# Patient Record
Sex: MALE | Race: WHITE | Employment: OTHER | URBAN - METROPOLITAN AREA
[De-identification: names, ages, dates, MRNs, and addresses within clinical notes are randomized per-mention and may not be internally consistent; named-entity substitution may affect disease eponyms.]

---

## 2017-01-04 RX ORDER — IRBESARTAN 300 MG/1
300 TABLET ORAL
Qty: 30 TAB | Refills: 0 | Status: SHIPPED | OUTPATIENT
Start: 2017-01-04 | End: 2017-02-03 | Stop reason: SDUPTHER

## 2017-01-04 NOTE — TELEPHONE ENCOUNTER
Patient would like a 30 day supply. He doesn't feel this medication is working as good.   He has made an appt to come back in

## 2017-01-11 ENCOUNTER — OFFICE VISIT (OUTPATIENT)
Dept: INTERNAL MEDICINE CLINIC | Age: 72
End: 2017-01-11

## 2017-01-11 ENCOUNTER — HOSPITAL ENCOUNTER (OUTPATIENT)
Dept: LAB | Age: 72
Discharge: HOME | End: 2017-01-11
Payer: MEDICARE

## 2017-01-11 VITALS
RESPIRATION RATE: 12 BRPM | BODY MASS INDEX: 32.4 KG/M2 | HEIGHT: 68 IN | TEMPERATURE: 97.9 F | OXYGEN SATURATION: 98 % | DIASTOLIC BLOOD PRESSURE: 87 MMHG | WEIGHT: 213.8 LBS | HEART RATE: 81 BPM | SYSTOLIC BLOOD PRESSURE: 133 MMHG

## 2017-01-11 DIAGNOSIS — I10 ESSENTIAL HYPERTENSION: Primary | ICD-10-CM

## 2017-01-11 DIAGNOSIS — Z83.79 FH: CELIAC DISEASE: ICD-10-CM

## 2017-01-11 DIAGNOSIS — G47.33 OSA (OBSTRUCTIVE SLEEP APNEA): ICD-10-CM

## 2017-01-11 DIAGNOSIS — M79.18 LEFT BUTTOCK PAIN: ICD-10-CM

## 2017-01-11 DIAGNOSIS — Z13.0 SCREENING FOR IRON DEFICIENCY ANEMIA: ICD-10-CM

## 2017-01-11 DIAGNOSIS — Z13.9 SCREENING: ICD-10-CM

## 2017-01-11 PROCEDURE — 82306 VITAMIN D 25 HYDROXY: CPT

## 2017-01-11 PROCEDURE — 83550 IRON BINDING TEST: CPT

## 2017-01-11 NOTE — PROGRESS NOTES
HISTORY OF PRESENT ILLNESS    Presents with left buttock pain for 1 month(s). No clear injury. Saw his wife's chiropractor and feels it helped some. It is currently asymptomatic. He moved his wallet to another location. Worse with a lot of movement. Associated symptoms include: no weakness. Treatments tried include: medication not used. Daughter and mother have celiac dz. He reports issues of acid reflux and senses that he feels his throat closes when he does not chew his food. Has to eat early dinner or his stomach will churn. Unclear if types of food make it worse. If he eats bread at night, it makes him very uncomfortable. Drank a beer without much issues. Started CPAP 10 days ago, nasal, tolerating it well    Hypertension-- home -165s/90-100s. Rarely below 150 at home, but was 117/73 today. Hypertension ROS: taking medications as instructed, no medication side effects noted, no TIA's, no chest pain on exertion, no dyspnea on exertion, no swelling of ankles     reports that he quit smoking about 39 years ago. His smoking use included Cigarettes. He has a 10.00 pack-year smoking history. He has never used smokeless tobacco.    reports that he drinks about 2.5 oz of alcohol per week    BP Readings from Last 2 Encounters:   01/11/17 133/87   12/01/16 153/80       Review of Systems   All other systems reviewed and are negative, except as noted in HPI    Past Medical and Surgical History   has a past medical history of Ankle fracture, left (6/29/15); AR (allergic rhinitis); Arthritis; Elevated prostate specific antigen (PSA); GERD (gastroesophageal reflux disease); Hearing loss; HTN (hypertension); Left leg paresthesias; Onychomycosis; CLARICE (obstructive sleep apnea) (2003); Panic attacks (2002); Right inguinal hernia; Sinus bradycardia; Squamous cell carcinoma ([facility] (1989); and UTI (urinary tract infection) (4/008). He also has no past medical history of Nausea & vomiting.    has a past surgical history that includes colonoscopy (2002); ostate biopsies (2006, 2008); colonoscopy (10/3/14); tonsillectomy; urological (2005 2008); hernia repair (Bilateral, 12/19/2014); ankle fracture tx (Left, 6/29/15); and other surgical (). reports that he quit smoking about 39 years ago. His smoking use included Cigarettes. He has a 10.00 pack-year smoking history. He has never used smokeless tobacco. He reports that he drinks about 2.5 oz of alcohol per week  He reports that he does not use illicit drugs. family history includes Cancer in his mother; Diabetes in his father; Heart Disease in his father and maternal grandmother; Hypertension in his father. Physical Exam   Nursing note and vitals reviewed. Blood pressure 133/87, pulse 81, temperature 97.9 °F (36.6 °C), temperature source Oral, resp. rate 12, height 5' 8\" (1.727 m), weight 213 lb 12.8 oz (97 kg), SpO2 98 %. Constitutional: In no distress. Eyes: Conjunctivae are normal.  HEENT:  No LAD or thyromegaly   Cardiovascular: Normal rate. regular rhythm. No murmurs  No edema  Pulmonary/Chest: Effort normal. clear to ausculation blaterally  Musculoskeletal:  no edema. Abd:  Neurological: Alert and oriented. Grossly intact cranial nerves and motor function. Skin: No rash noted. Psychiatric: Normal mood and affect. Behavior is normal.     ASSESSMENT and PLAN  Kurt Garcia was seen today for hip pain. Diagnoses and all orders for this visit:    Essential hypertension- based on my history, the overall control of this problem borderline controlled. Will see over the next month how he does using CPAP. Consider adding low dose amlodipine    CLARICE (obstructive sleep apnea)    FH: celiac disease- he may consider trial of gluten-free diet, regardless of blood test results  -     CELIAC ANTIBODY PROFILE    Left buttock pain- Stretching exercises demonstrated for for this condition.   Consider PT    Screening for iron deficiency anemia  -     IRON PROFILE    Screening  -     CELIAC ANTIBODY PROFILE  -     VITAMIN D, 25 HYDROXY  -     IRON PROFILE        lab results and schedule of future lab studies reviewed with patient  reviewed medications and side effects in detail    Return to clinic for further evaluation if new symptoms develop or if current symptoms worsen or fail to resolve. There are no Patient Instructions on file for this visit.

## 2017-01-11 NOTE — PATIENT INSTRUCTIONS
Gluten-Free Diet: Care Instructions  Your Care Instructions  To help your symptoms, your doctor has recommended a gluten-free diet. This means not eating foods that have gluten in them. Gluten is a kind of protein. It's found in wheat, barley, and rye. If you eat a gluten-free diet, you can help manage your symptoms and prevent long-term problems. You can also get all the nutrition you need. Follow-up care is a key part of your treatment and safety. Be sure to make and go to all appointments, and call your doctor if you are having problems. It's also a good idea to know your test results and keep a list of the medicines you take. How can you care for yourself at home? · Don't eat any foods that have gluten in them. These include bagels, bread, crackers, and some cereals. They also include pasta and pizza. · Carefully read food labels. Look for wheat or wheat products in ice cream and candy. You may also find them in salad dressing, canned and frozen soups and vegetables, and other processed foods. · Avoid all beer products unless the label says they are gluten-free. Beers with and without alcohol have gluten unless the labels say they are gluten-free. This includes lagers, ales, and stouts. · When you eat out, look for restaurants that serve gluten-free food. You can also ask if the  is familiar with gluten-free cooking. · Try to learn more about gluten-free options. Find grocery stores that sell gluten-free pizza and other foods. If you have access to the Internet, look online for gluten-free foods and recipes. · On a gluten-free eating plan, it's okay to have:  ¨ Eggs and dairy products. (But some dairy products may make your symptoms worse. Ask your doctor if you have questions about dairy products. Read ingredient labels carefully.  Some processed cheeses contain gluten.)  ¨ Flours and foods made with amaranth, arrowroot, beans, buckwheat, corn, cornmeal, flax, millet, potatoes, pure uncontaminated nut and oat bran, quinoa, rice, sorghum, soybeans, tapioca, or teff. ¨ Fresh, frozen, or canned unprocessed meats. But avoid processed meats. Some examples of processed meats to avoid are hot dogs, salami, and deli meat. Read labels for additives that may contain gluten. ¨ Fresh, frozen, dried, or canned fruits and vegetables, if they do not have thickeners or other additives that contain gluten. ¨ Some alcohol drinks. These include wine, liqueurs, and ciders. They also include liquor like whiskey and shey. When should you call for help? Watch closely for changes in your health, and be sure to contact your doctor if:  · You have unexplained weight loss. · You have diarrhea that lasts longer than 1 to 2 weeks. · You have unusual fatigue or mood changes, especially if these last more than a week and are not related to any other illness, such as the flu. · Your symptoms come back again. · Your stomach pain gets worse. Where can you learn more? Go to http://nasir-abdi.info/. Enter 31 41 19 in the search box to learn more about \"Gluten-Free Diet: Care Instructions. \"  Current as of: July 26, 2016  Content Version: 11.1  © 7294-5085 [employer]. Care instructions adapted under license by [employer] (which disclaims liability or warranty for this information). If you have questions about a medical condition or this instruction, always ask your healthcare professional. [address] any warranty or liability for your use of this information.

## 2017-01-11 NOTE — MR AVS SNAPSHOT
Visit Information Date & Time Provider Department Dept. Phone Encounter #  
 1/11/2017  4:00 PM Crystal Ferrer MD [facility] [address] [id number] Upcoming Health Maintenance Date Due  
 MEDICARE YEARLY EXAM 9/28/2017 Pneumococcal 65+ Low/Medium Risk (2 of 2 - PPSV23) 12/3/2017 GLAUCOMA SCREENING Q2Y 12/9/2017 COLONOSCOPY 10/2/2019 DTaP/Tdap/Td series (2 - Td) 11/21/2021 Allergies as of 1/11/2017  Review Complete On: 1/11/2017 By: Crystal Ferrer MD  
 No Known Allergies Current Immunizations  Reviewed on 10/27/2016 Name Date Pneumococcal Vaccine (Unspecified Type) 12/3/2012 TD Vaccine 9/22/2002 TDAP Vaccine 11/21/2011 Zoster Vaccine, Live 3/1/2014 Not reviewed this visit You Were Diagnosed With   
  
 Codes Comments Essential hypertension    -  Primary ICD-10-CM: I10 
ICD-9-CM: 401.9 CLARICE (obstructive sleep apnea)     ICD-10-CM: G47.33 
ICD-9-CM: 327.23 FH: celiac disease     ICD-10-CM: Z83.79 ICD-9-CM: V18.59 Left buttock pain     ICD-10-CM: M79.1 ICD-9-CM: 729.1 Screening for iron deficiency anemia     ICD-10-CM: Z13.0 ICD-9-CM: V78.0 Screening     ICD-10-CM: Z13.9 ICD-9-CM: V82.9 Vitals BP Pulse Temp Resp Height(growth percentile) Weight(growth percentile) 133/87 (BP 1 Location: Left arm, BP Patient Position: Sitting) 81 97.9 °F (36.6 °C) (Oral) 12 5' 8\" (1.727 m) 213 lb 12.8 oz (97 kg) SpO2 BMI Smoking Status 98% 32.51 kg/m2 Former Smoker Vitals History BMI and BSA Data Body Mass Index Body Surface Area 32.51 kg/m 2 2.16 m 2 Preferred Pharmacy Pharmacy Name Phone [facility], [address] [phone] Your Updated Medication List  
  
   
This list is accurate as of: 1/11/17  4:38 PM.  Always use your most recent med list.  
  
  
  
  
 cpap machine kit by Does Not Apply route. irbesartan 300 mg tablet Commonly known as:  AVAPRO Take 1 Tab by mouth nightly. Replaces losartan 9/29/16  
  
 raNITIdine 300 mg tablet Commonly known as:  ZANTAC Take 1 Tab by mouth daily. Indications: HEARTBURN We Performed the Following CELIAC ANTIBODY PROFILE [[id number] Custom] IRON PROFILE [id number] CPT(R)] VITAMIN D, 25 HYDROXY [id number] CPT(R)] Patient Instructions Gluten-Free Diet: Care Instructions Your Care Instructions To help your symptoms, your doctor has recommended a gluten-free diet. This means not eating foods that have gluten in them. Gluten is a kind of protein. It's found in wheat, barley, and rye. If you eat a gluten-free diet, you can help manage your symptoms and prevent long-term problems. You can also get all the nutrition you need. Follow-up care is a key part of your treatment and safety. Be sure to make and go to all appointments, and call your doctor if you are having problems. It's also a good idea to know your test results and keep a list of the medicines you take. How can you care for yourself at home? · Don't eat any foods that have gluten in them. These include bagels, bread, crackers, and some cereals. They also include pasta and pizza. · Carefully read food labels. Look for wheat or wheat products in ice cream and candy. You may also find them in salad dressing, canned and frozen soups and vegetables, and other processed foods. · Avoid all beer products unless the label says they are gluten-free. Beers with and without alcohol have gluten unless the labels say they are gluten-free. This includes lagers, ales, and stouts. · When you eat out, look for restaurants that serve gluten-free food. You can also ask if the  is familiar with gluten-free cooking. · Try to learn more about gluten-free options. Find grocery stores that sell gluten-free pizza and other foods.  If you have access to the Internet, look online for gluten-free foods and recipes. · On a gluten-free eating plan, it's okay to have: 
¨ Eggs and dairy products. (But some dairy products may make your symptoms worse. Ask your doctor if you have questions about dairy products. Read ingredient labels carefully. Some processed cheeses contain gluten.) ¨ Flours and foods made with amaranth, arrowroot, beans, buckwheat, corn, cornmeal, flax, millet, potatoes, pure uncontaminated nut and oat bran, quinoa, rice, sorghum, soybeans, tapioca, or teff. ¨ Fresh, frozen, or canned unprocessed meats. But avoid processed meats. Some examples of processed meats to avoid are hot dogs, salami, and deli meat. Read labels for additives that may contain gluten. ¨ Fresh, frozen, dried, or canned fruits and vegetables, if they do not have thickeners or other additives that contain gluten. ¨ Some alcohol drinks. These include wine, liqueurs, and ciders. They also include liquor like whiskey and shey. When should you call for help? Watch closely for changes in your health, and be sure to contact your doctor if: 
· You have unexplained weight loss. · You have diarrhea that lasts longer than 1 to 2 weeks. · You have unusual fatigue or mood changes, especially if these last more than a week and are not related to any other illness, such as the flu. · Your symptoms come back again. · Your stomach pain gets worse. Where can you learn more? Go to http://nasir-abdi.info/. Enter 31 41 19 in the search box to learn more about \"Gluten-Free Diet: Care Instructions. \" Current as of: July 26, 2016 Content Version: 11.1 © 4455-4168 [employer]. Care instructions adapted under license by [employer] (which disclaims liability or warranty for this information).  If you have questions about a medical condition or this instruction, always ask your healthcare professional. [employer] Gar[employer] disclaims any warranty or liability for your use of this information. Introducing [facility]! Dear Stone Rivers: Thank you for requesting a Beepi account. Our records indicate that you already have an active Beepi account. You can access your account anytime at [email or web address] Did you know that you can access your hospital and ER discharge instructions at any time in Beepi? You can also review all of your test results from your hospital stay or ER visit. Additional Information If you have questions, please visit the Frequently Asked Questions section of the Beepi website at [email or web address] Remember, Beepi is NOT to be used for urgent needs. For medical emergencies, dial 911. Now available from your iPhone and Android! Please provide this summary of care documentation to your next provider. Your primary care clinician is listed as Glennda Baumgarten. If you have any questions after today's visit, please call [phone].

## 2017-01-11 NOTE — PROGRESS NOTES
Patient presents with left hip pain. Saw chiropractor a couple of times that has helped some. His blood pressure is running higher 150-160s/,  Today 117/73, then 132/84. Daughter diagnosed with celiac, as well as his mother. Can he be checked for this? Has stomach problems. Sister with low vit D. Started a Cpap about 1.5 wk ago.

## 2017-01-12 LAB
25(OH)D3+25(OH)D2 SERPL-MCNC: 19.7 NG/ML (ref 30–100)
GLIADIN PEPTIDE IGA SER-ACNC: 80 UNITS (ref 0–19)
GLIADIN PEPTIDE IGG SER-ACNC: 48 UNITS (ref 0–19)
IGA SERPL-MCNC: 248 MG/DL (ref 61–437)
IRON SATN MFR SERPL: 23 % (ref 15–55)
IRON SERPL-MCNC: 60 UG/DL (ref 38–169)
TIBC SERPL-MCNC: 259 UG/DL (ref 250–450)
TTG IGA SER-ACNC: 34 U/ML (ref 0–3)
TTG IGG SER-ACNC: 3 U/ML (ref 0–5)
UIBC SERPL-MCNC: 199 UG/DL (ref 111–343)

## 2017-01-17 PROBLEM — R89.4 ABNORMAL CELIAC ANTIBODY PANEL: Status: ACTIVE | Noted: 2017-01-01

## 2017-01-17 RX ORDER — ASPIRIN 325 MG
1 TABLET, DELAYED RELEASE (ENTERIC COATED) ORAL
Qty: 8 CAP | Refills: 0 | Status: SHIPPED | OUTPATIENT
Start: 2017-01-17 | End: 2017-03-08

## 2017-01-26 ENCOUNTER — OFFICE VISIT (OUTPATIENT)
Dept: INTERNAL MEDICINE CLINIC | Age: 72
End: 2017-01-26

## 2017-01-26 VITALS
TEMPERATURE: 97.7 F | HEART RATE: 63 BPM | RESPIRATION RATE: 12 BRPM | WEIGHT: 208.8 LBS | HEIGHT: 68 IN | DIASTOLIC BLOOD PRESSURE: 87 MMHG | BODY MASS INDEX: 31.64 KG/M2 | OXYGEN SATURATION: 97 % | SYSTOLIC BLOOD PRESSURE: 150 MMHG

## 2017-01-26 DIAGNOSIS — R89.4 ABNORMAL CELIAC ANTIBODY PANEL: Primary | ICD-10-CM

## 2017-01-26 DIAGNOSIS — I10 ESSENTIAL HYPERTENSION: ICD-10-CM

## 2017-01-26 DIAGNOSIS — J01.90 ACUTE NON-RECURRENT SINUSITIS, UNSPECIFIED LOCATION: ICD-10-CM

## 2017-01-26 DIAGNOSIS — K21.9 GASTROESOPHAGEAL REFLUX DISEASE WITHOUT ESOPHAGITIS: ICD-10-CM

## 2017-01-26 DIAGNOSIS — R13.19 INTERMITTENT DYSPHAGIA: ICD-10-CM

## 2017-01-26 RX ORDER — AMOXICILLIN AND CLAVULANATE POTASSIUM 875; 125 MG/1; MG/1
1 TABLET, FILM COATED ORAL 2 TIMES DAILY
Qty: 20 TAB | Refills: 0 | Status: SHIPPED | OUTPATIENT
Start: 2017-01-26 | End: 2017-02-05

## 2017-01-26 RX ORDER — FLUTICASONE PROPIONATE 50 MCG
2 SPRAY, SUSPENSION (ML) NASAL DAILY
Qty: 1 BOTTLE | Refills: 5 | Status: SHIPPED | OUTPATIENT
Start: 2017-01-26 | End: 2017-03-08

## 2017-01-26 NOTE — MR AVS SNAPSHOT
Visit Information Date & Time Provider Department Dept. Phone Encounter #  
 1/26/2017  9:30 AM Rachel Beckett MD [facility] [address] [id number] Your Appointments 2/23/2017  9:40 AM  
Any with Rocio Angel MD  
[address] ([facility]) Appt Note: 1st adherence started CPAP 1/2/16  
 [address] [facility] [address] [id number] [address] [facility] [address] Upcoming Health Maintenance Date Due  
 MEDICARE YEARLY EXAM 9/28/2017 Pneumococcal 65+ Low/Medium Risk (2 of 2 - PPSV23) 12/3/2017 GLAUCOMA SCREENING Q2Y 12/9/2017 COLONOSCOPY 10/2/2019 DTaP/Tdap/Td series (2 - Td) 11/21/2021 Allergies as of 1/26/2017  Review Complete On: 1/26/2017 By: Rachel Beckett MD  
 No Known Allergies Current Immunizations  Reviewed on 10/27/2016 Name Date Pneumococcal Vaccine (Unspecified Type) 12/3/2012 TD Vaccine 9/22/2002 TDAP Vaccine 11/21/2011 Zoster Vaccine, Live 3/1/2014 Not reviewed this visit You Were Diagnosed With   
  
 Codes Comments Abnormal celiac antibody panel    -  Primary ICD-10-CM: R89.4 ICD-9-CM: 795.79 Acute non-recurrent sinusitis, unspecified location     ICD-10-CM: J01.90 ICD-9-CM: 461.9 Intermittent dysphagia     ICD-10-CM: R13.19 ICD-9-CM: 787.29 Gastroesophageal reflux disease without esophagitis     ICD-10-CM: K21.9 ICD-9-CM: 530.81 Essential hypertension     ICD-10-CM: I10 
ICD-9-CM: 401.9 Vitals BP Pulse Temp Resp Height(growth percentile) Weight(growth percentile) 150/87 (BP 1 Location: Left arm, BP Patient Position: Sitting) 63 97.7 °F (36.5 °C) (Oral) 12 5' 8\" (1.727 m) 208 lb 12.8 oz (94.7 kg) SpO2 BMI Smoking Status 97% 31.75 kg/m2 Former Smoker Vitals History BMI and BSA Data Body Mass Index Body Surface Area 31.75 kg/m 2 2.13 m 2 Preferred Pharmacy Pharmacy Name Phone [facility], [address] [facility] [phone] Your Updated Medication List  
  
   
This list is accurate as of: 17  9:53 AM.  Always use your most recent med list.  
  
  
  
  
 amoxicillin-clavulanate 875-125 mg per tablet Commonly known as:  AUGMENTIN Take 1 Tab by mouth two (2) times a day for 10 days. Cholecalciferol (Vitamin D3) 50,000 unit Cap Take 1 Cap by mouth every seven (7) days for 8 doses. For 8 weeks  
  
 cpap machine kit  
by Does Not Apply route. fluticasone 50 mcg/actuation nasal spray Commonly known as:  Imagene Poot 2 Sprays by Both Nostrils route daily. irbesartan 300 mg tablet Commonly known as:  AVAPRO Take 1 Tab by mouth nightly. Replaces losartan 16  
  
 raNITIdine 300 mg tablet Commonly known as:  ZANTAC Take 1 Tab by mouth daily. Indications: HEARTBURN Prescriptions Sent to Pharmacy Refills  
 amoxicillin-clavulanate (AUGMENTIN) 875-125 mg per tablet 0 Sig: Take 1 Tab by mouth two (2) times a day for 10 days. Class: Normal  
 Pharmacy: [facility] [id number] [address] [place] [address] Ph #: [phone] Route: Oral  
 fluticasone (FLONASE) 50 mcg/actuation nasal spray 5 Si Sprays by Both Nostrils route daily. Class: Normal  
 Pharmacy: [facility] [id number] [address] [place] [address] Ph #: [phone] Route: Both Nostrils We Performed the Following REFERRAL TO GASTROENTEROLOGY [VYI53 Custom] Comments:  
 Please evaluate patient for intermittent dysphagia and positive celiac antibodies. Needs EGD to evaluate esophagus and also small intestinal biopsy to r/o celiac disease. To-Do List   
 Around 2017 Imaging:  XR BA SWALLOW ESOPHOGRAM   
  
  
Referral Information Referral ID Referred By Referred To  
  
 [id number] David Mari, [address]  [place], [address] [facility] Visits Status Start Date End Date 1 New Request 1/26/17 1/26/18 If your referral has a status of pending review or denied, additional information will be sent to support the outcome of this decision. Patient Instructions Gluten-Free Diet: Care Instructions Your Care Instructions To help your symptoms, your doctor has recommended a gluten-free diet. This means not eating foods that have gluten in them. Gluten is a kind of protein. It's found in wheat, barley, and rye. If you eat a gluten-free diet, you can help manage your symptoms and prevent long-term problems. You can also get all the nutrition you need. Follow-up care is a key part of your treatment and safety. Be sure to make and go to all appointments, and call your doctor if you are having problems. It's also a good idea to know your test results and keep a list of the medicines you take. How can you care for yourself at home? · Don't eat any foods that have gluten in them. These include bagels, bread, crackers, and some cereals. They also include pasta and pizza. · Carefully read food labels. Look for wheat or wheat products in ice cream and candy. You may also find them in salad dressing, canned and frozen soups and vegetables, and other processed foods. · Avoid all beer products unless the label says they are gluten-free. Beers with and without alcohol have gluten unless the labels say they are gluten-free. This includes lagers, ales, and stouts. · When you eat out, look for restaurants that serve gluten-free food. You can also ask if the  is familiar with gluten-free cooking. · Try to learn more about gluten-free options. Find grocery stores that sell gluten-free pizza and other foods.  If you have access to the Internet, look online for gluten-free foods and recipes. · On a gluten-free eating plan, it's okay to have: 
¨ Eggs and dairy products. (But some dairy products may make your symptoms worse. Ask your doctor if you have questions about dairy products. Read ingredient labels carefully. Some processed cheeses contain gluten.) ¨ Flours and foods made with amaranth, arrowroot, beans, buckwheat, corn, cornmeal, flax, millet, potatoes, pure uncontaminated nut and oat bran, quinoa, rice, sorghum, soybeans, tapioca, or teff. ¨ Fresh, frozen, or canned unprocessed meats. But avoid processed meats. Some examples of processed meats to avoid are hot dogs, salami, and deli meat. Read labels for additives that may contain gluten. ¨ Fresh, frozen, dried, or canned fruits and vegetables, if they do not have thickeners or other additives that contain gluten. ¨ Some alcohol drinks. These include wine, liqueurs, and ciders. They also include liquor like whiskey and shey. When should you call for help? Watch closely for changes in your health, and be sure to contact your doctor if: 
· You have unexplained weight loss. · You have diarrhea that lasts longer than 1 to 2 weeks. · You have unusual fatigue or mood changes, especially if these last more than a week and are not related to any other illness, such as the flu. · Your symptoms come back again. · Your stomach pain gets worse. Where can you learn more? Go to http://nasir-abdi.info/. Enter 31 41 19 in the search box to learn more about \"Gluten-Free Diet: Care Instructions. \" Current as of: July 26, 2016 Content Version: 11.1 © 1093-3293 Helpa. Care instructions adapted under license by Chibwe (which disclaims liability or warranty for this information).  If you have questions about a medical condition or this instruction, always ask your healthcare professional. [employer] disclaims any warranty or liability for your use of this information. Introducing [facility]! Dear Kurt Garcia: Thank you for requesting a Milyoni account. Our records indicate that you already have an active Milyoni account. You can access your account anytime at [email or web address] Did you know that you can access your hospital and ER discharge instructions at any time in Milyoni? You can also review all of your test results from your hospital stay or ER visit. Additional Information If you have questions, please visit the Frequently Asked Questions section of the Milyoni website at [email or web address] Remember, Milyoni is NOT to be used for urgent needs. For medical emergencies, dial 911. Now available from your iPhone and Android! Please provide this summary of care documentation to your next provider. Your primary care clinician is listed as Davidson Barkley. If you have any questions after today's visit, please call [phone].

## 2017-01-26 NOTE — PROGRESS NOTES
Patient presents with nasal congestion. Discuss gluten intolerance. Yesterday, got food stuck in throat.

## 2017-01-26 NOTE — PROGRESS NOTES
HISTORY OF PRESENT ILLNESS    Chief Complaint   Patient presents with    Nasal Congestion       Presents for follow-up    Presents with nasal blockage, post nasal drip and bilateral sinus pain for 1 weeks. Denies shortness of breath, chest pain, abdominal pain, nausea, vomiting,  fever and chills. Symptoms are moderate. Recent treatment for similar symptoms? None. Has tried over-the-counter remedies  with mild and paritial relief of symptoms. Contacts with similar infections: no.  Asthma?:  no.   reports that he quit smoking about 39 years ago. His smoking use included Cigarettes. He has a 10.00 pack-year smoking history. He has never used smokeless tobacco.   Using CPAP now. Reports FH of celiac dz. He has occasional cramping at night. No change with certain types of food or beer. Labs showed +TTG ab and anit-gliadin ab. He wants to start gluten-free diet and started it 3 days ago. Choked on chicken yesterday. Also has episode 1 year ago where he needed a heimlich. Usually does fine with all kinds of food. Sister with esophageal spasms. Review of Systems   All other systems reviewed and are negative, except as noted in HPI    Past Medical and Surgical History   has a past medical history of Abnormal celiac antibody panel (01/2017); Ankle fracture, left (6/29/15); AR (allergic rhinitis); Arthritis; Elevated prostate specific antigen (PSA); GERD (gastroesophageal reflux disease); Hearing loss; HTN (hypertension); Left leg paresthesias; Onychomycosis; CLARICE (obstructive sleep apnea) (2003); Panic attacks (2002); Right inguinal hernia; Sinus bradycardia; Squamous cell carcinoma ([facility] [address] (1989); and UTI (urinary tract infection) (4/008). He also has no past medical history of Nausea & vomiting.    has a past surgical history that includes colonoscopy (2002); ostate biopsies (2006, 2008); colonoscopy (10/3/14); tonsillectomy; urological (2005 2008); hernia repair (Bilateral, 12/19/2014); ankle fracture tx (Left, 6/29/15); and other surgical (). reports that he quit smoking about 39 years ago. His smoking use included Cigarettes. He has a 10.00 pack-year smoking history. He has never used smokeless tobacco. He reports that he drinks about 2.5 oz of alcohol per week  He reports that he does not use illicit drugs. family history includes Cancer in his mother; Celiac Disease in his daughter and mother; Diabetes in his father; Heart Disease in his father and maternal grandmother; Hypertension in his father. Physical Exam   Nursing note and vitals reviewed. Blood pressure 150/87, pulse 63, temperature 97.7 °F (36.5 °C), temperature source Oral, resp. rate 12, height 5' 8\" (1.727 m), weight 208 lb 12.8 oz (94.7 kg), SpO2 97 %. Constitutional:  No distress. Eyes: Conjunctivae are normal.   Ears:  Hearing grossly intact  Nasal congestion  Cardiovascular: Normal rate. regular rhythm, no murmurs or gallops  No edema  Pulmonary/Chest: Effort normal.   CTAB  Musculoskeletal: moves all 4 extremities   Neurological: Alert and oriented to person, place, and time. Skin: No rash noted. Psychiatric: Normal mood and affect. Behavior is normal.     ASSESSMENT and PLAN  Rayna Martínez was seen today for nasal congestion. Diagnoses and all orders for this visit:    Abnormal celiac antibody panel  Discussed how this does not diagnose celiac disease. He is 81years old and has no evidence of malabsorption or weight loss. Stools are normal.  Appears to tolerate gluten rich foods without issue. He was going to start a gluten-free diet and  is willing to do so. That is reasonable, but I think getting an upper endoscopy while eating gluten-containing foods and checking for intestinal blunting is reasonable to. Gluten-free diet is challenging and I do not really think he necessarily has to do it.     Acute non-recurrent sinusitis, unspecified location  Trial of Flonase and Augmentin  - amoxicillin-clavulanate (AUGMENTIN) 875-125 mg per tablet; Take 1 Tab by mouth two (2) times a day for 10 days. -     fluticasone (FLONASE) 50 mcg/actuation nasal spray; 2 Sprays by Both Nostrils route daily. Intermittent dysphagia  Likely esophageal spasm. However, given issues with reflux and possible celiac, recommend upper endoscopy and esophagram  -     XR BA SWALLOW ESOPHOGRAM; Future  -     REFERRAL TO GASTROENTEROLOGY    Essential hypertension   he is starting treatment for sleep apnea. Will monitor blood pressure for now. May need to have low-dose of amlodipine. There are no Patient Instructions on file for this visit.    lab results and schedule of future lab studies reviewed with patient  reviewed medications and side effects in detail    Return to clinic for further evaluation if new symptoms develop    Follow-up Disposition: Not on File    Current Outpatient Prescriptions   Medication Sig    amoxicillin-clavulanate (AUGMENTIN) 875-125 mg per tablet Take 1 Tab by mouth two (2) times a day for 10 days.  fluticasone (FLONASE) 50 mcg/actuation nasal spray 2 Sprays by Both Nostrils route daily.  Cholecalciferol, Vitamin D3, 50,000 unit cap Take 1 Cap by mouth every seven (7) days for 8 doses. For 8 weeks    cpap machine kit by Does Not Apply route.  irbesartan (AVAPRO) 300 mg tablet Take 1 Tab by mouth nightly. Replaces losartan 9/29/16    ranitidine (ZANTAC) 300 mg tablet Take 1 Tab by mouth daily. Indications: HEARTBURN     No current facility-administered medications for this visit.

## 2017-01-26 NOTE — PATIENT INSTRUCTIONS
Gluten-Free Diet: Care Instructions  Your Care Instructions  To help your symptoms, your doctor has recommended a gluten-free diet. This means not eating foods that have gluten in them. Gluten is a kind of protein. It's found in wheat, barley, and rye. If you eat a gluten-free diet, you can help manage your symptoms and prevent long-term problems. You can also get all the nutrition you need. Follow-up care is a key part of your treatment and safety. Be sure to make and go to all appointments, and call your doctor if you are having problems. It's also a good idea to know your test results and keep a list of the medicines you take. How can you care for yourself at home? · Don't eat any foods that have gluten in them. These include bagels, bread, crackers, and some cereals. They also include pasta and pizza. · Carefully read food labels. Look for wheat or wheat products in ice cream and candy. You may also find them in salad dressing, canned and frozen soups and vegetables, and other processed foods. · Avoid all beer products unless the label says they are gluten-free. Beers with and without alcohol have gluten unless the labels say they are gluten-free. This includes lagers, ales, and stouts. · When you eat out, look for restaurants that serve gluten-free food. You can also ask if the  is familiar with gluten-free cooking. · Try to learn more about gluten-free options. Find grocery stores that sell gluten-free pizza and other foods. If you have access to the Internet, look online for gluten-free foods and recipes. · On a gluten-free eating plan, it's okay to have:  ¨ Eggs and dairy products. (But some dairy products may make your symptoms worse. Ask your doctor if you have questions about dairy products. Read ingredient labels carefully.  Some processed cheeses contain gluten.)  ¨ Flours and foods made with amaranth, arrowroot, beans, buckwheat, corn, cornmeal, flax, millet, potatoes, pure uncontaminated nut and oat bran, quinoa, rice, sorghum, soybeans, tapioca, or teff. ¨ Fresh, frozen, or canned unprocessed meats. But avoid processed meats. Some examples of processed meats to avoid are hot dogs, salami, and deli meat. Read labels for additives that may contain gluten. ¨ Fresh, frozen, dried, or canned fruits and vegetables, if they do not have thickeners or other additives that contain gluten. ¨ Some alcohol drinks. These include wine, liqueurs, and ciders. They also include liquor like whiskey and shey. When should you call for help? Watch closely for changes in your health, and be sure to contact your doctor if:  · You have unexplained weight loss. · You have diarrhea that lasts longer than 1 to 2 weeks. · You have unusual fatigue or mood changes, especially if these last more than a week and are not related to any other illness, such as the flu. · Your symptoms come back again. · Your stomach pain gets worse. Where can you learn more? Go to http://nasir-abdi.info/. Enter 31 [phone] 19 in the search box to learn more about \"Gluten-Free Diet: Care Instructions. \"  Current as of: July 26, 2016  Content Version: 11.1  © 2047-9915 [employer]. Care instructions adapted under license by [employer] (which disclaims liability or warranty for this information). If you have questions about a medical condition or this instruction, always ask your healthcare professional. [address] any warranty or liability for your use of this information.

## 2017-01-31 ENCOUNTER — HOSPITAL ENCOUNTER (OUTPATIENT)
Dept: GENERAL RADIOLOGY | Age: 72
Discharge: HOME | End: 2017-01-31
Attending: INTERNAL MEDICINE
Payer: MEDICARE

## 2017-01-31 DIAGNOSIS — R13.19 INTERMITTENT DYSPHAGIA: ICD-10-CM

## 2017-01-31 DIAGNOSIS — K21.9 GASTROESOPHAGEAL REFLUX DISEASE WITHOUT ESOPHAGITIS: ICD-10-CM

## 2017-01-31 PROCEDURE — 74220 X-RAY XM ESOPHAGUS 1CNTRST: CPT

## 2017-02-03 DIAGNOSIS — R07.9 CHEST PAIN, UNSPECIFIED TYPE: ICD-10-CM

## 2017-02-03 DIAGNOSIS — K21.9 GASTROESOPHAGEAL REFLUX DISEASE WITHOUT ESOPHAGITIS: ICD-10-CM

## 2017-02-03 RX ORDER — RANITIDINE 300 MG/1
TABLET ORAL
Qty: 30 TAB | Refills: 2 | Status: SHIPPED | OUTPATIENT
Start: 2017-02-03 | End: 2017-05-07 | Stop reason: SDUPTHER

## 2017-02-03 RX ORDER — RANITIDINE 300 MG/1
300 TABLET ORAL DAILY
Qty: 30 TAB | Refills: 2 | Status: SHIPPED | OUTPATIENT
Start: 2017-02-03 | End: 2017-03-08

## 2017-02-03 RX ORDER — IRBESARTAN 300 MG/1
300 TABLET ORAL
Qty: 30 TAB | Refills: 0 | Status: SHIPPED | OUTPATIENT
Start: 2017-02-03 | End: 2017-03-06 | Stop reason: SDUPTHER

## 2017-02-07 ENCOUNTER — TELEPHONE (OUTPATIENT)
Dept: INTERNAL MEDICINE CLINIC | Age: 72
End: 2017-02-07

## 2017-02-07 NOTE — TELEPHONE ENCOUNTER
----- Message from [facility] sent at 2/7/2017  4:09 PM EST -----  Regarding: Dr. Rock Sosa [phone]         Pt is requesting a call back to check on the status of a referral to a Dr. Dave Phillips.

## 2017-03-02 ENCOUNTER — OFFICE VISIT (OUTPATIENT)
Dept: SLEEP MEDICINE | Age: 72
End: 2017-03-02

## 2017-03-02 VITALS
OXYGEN SATURATION: 93 % | WEIGHT: 215 LBS | SYSTOLIC BLOOD PRESSURE: 134 MMHG | HEART RATE: 61 BPM | DIASTOLIC BLOOD PRESSURE: 84 MMHG | TEMPERATURE: 97.9 F | HEIGHT: 68 IN | BODY MASS INDEX: 32.58 KG/M2

## 2017-03-02 DIAGNOSIS — G47.33 OSA (OBSTRUCTIVE SLEEP APNEA): Primary | ICD-10-CM

## 2017-03-02 DIAGNOSIS — E66.9 OBESITY (BMI 30-39.9): ICD-10-CM

## 2017-03-02 NOTE — PROGRESS NOTES
[address] [facility]. [place], [address]  Tel.  [phone]  Fax. [address] [facility] [address]  [place], [address] [facility]  Tel.  [phone]  Fax. [phone] [address] [facility] [address]   Tel.  [phone]  Fax. [phone]     S>Ulysses Moseley is a 81 y.o. male seen for a positive airway pressure follow-up. He reports no problems using the device. The following problems are identified:    Drowsiness no Problems exhaling yes   Snoring no Forget to put on no   Mask Comfortable yes Can't fall asleep no   Dry Mouth no Mask falls off no   Air Leaking no Frequent awakenings no         He admits that his sleep has improved. No Known Allergies    He has a current medication list which includes the following prescription(s): ranitidine, ranitidine, irbesartan, fluticasone, cholecalciferol (vitamin d3), and cpap machine. .      He  has a past medical history of Abnormal celiac antibody panel (01/2017); Ankle fracture, left (6/29/15); AR (allergic rhinitis); Arthritis; Elevated prostate specific antigen (PSA); Esophageal motility disorder (01/2017); GERD (gastroesophageal reflux disease); Hearing loss; HTN (hypertension); Left leg paresthesias; Onychomycosis; CLARICE (obstructive sleep apnea) (2003); Panic attacks (2002); Right inguinal hernia; Sinus bradycardia; Squamous cell carcinoma ([facility] [address] (1989); and UTI (urinary tract infection) (4/008). He also has no past medical history of Nausea & vomiting. Mclean Sleepiness Score: 7   and Modified F.O.S.Q. Score Total / 2: 17   which reflect improved sleep quality over therapy time.     O>    Visit Vitals    /84    Pulse 61    Temp 97.9 °F (36.6 °C)    Ht 5' 8\" (1.727 m)    Wt 215 lb (97.5 kg)    SpO2 93%    BMI 32.69 kg/m2           General:   Alert, oriented, not in distress   Neck:   No JVD    Chest/Lungs:  symetrical lung expansion , no accessory muscle use    Extremities:  no obvious rashes , negative edema Neuro: No focal deficits ; No obvious tremor    Psych:  Normal affect ,  Normal countenance ;           A>    ICD-10-CM ICD-9-CM    1. CLARICE (obstructive sleep apnea) G47.33 327.23    2. Obesity (BMI 30-39. 9) E66.9 278.00      AHI = 30 (2016). On CPAP :  4-15 cmH2O. Compliant:      yes    Therapeutic Response:  Positive    P>    * Expiratory Pressure Relief (EPR) of level 3 was added for comfort. * Follow-up Disposition:  Return in about 1 year (around 3/2/2018), or if symptoms worsen or fail to improve. * He was asked to contact our office for any problems regarding PAP therapy. * Counseling was provided regarding the importance of regular PAP use and on proper sleep hygiene and safe driving. * Re-enforced proper and regular cleaning for the device. I have reviewed/discussed the above normal BMI with the patient. I have recommended the following interventions: dietary management education, guidance, and counseling . The plan is as follows: I have counseled this patient on diet and exercise regimens. .    Thank you for allowing us to participate in your patient's medical care.     Andi Corrigan M.D.  (electronically signed)  Diplomate in Sleep Medicine, [facility]

## 2017-03-02 NOTE — PATIENT INSTRUCTIONS
[address] [facility] [place]. [employer], [address]  Tel.  [phone]  Fax. [address] [facility] 60  [place], [address] [facility]  Tel.  [phone]  Fax. [phone] [address]  Tel.  [phone]  Fax. [phone]     Learning About CPAP for Sleep Apnea  What is CPAP? CPAP is a small machine that you use at home every night while you sleep. It increases air pressure in your throat to keep your airway open. When you have sleep apnea, this can help you sleep better so you feel much better. CPAP stands for \"continuous positive airway pressure. \"  The CPAP machine will have one of the following:  · A mask that covers your nose and mouth  · Prongs that fit into your nose  · A mask that covers your nose only, the most common type. This type is called NCPAP. The N stands for \"nasal.\"  Why is it done? CPAP is usually the best treatment for obstructive sleep apnea. It is the first treatment choice and the most widely used. Your doctor may suggest CPAP if you have:  · Moderate to severe sleep apnea. · Sleep apnea and coronary artery disease (CAD) or heart failure. How does it help? · CPAP can help you have more normal sleep, so you feel less sleepy and more alert during the daytime. · CPAP may help keep heart failure or other heart problems from getting worse. · NCPAP may help lower your blood pressure. · If you use CPAP, your bed partner may also sleep better because you are not snoring or restless. What are the side effects? Some people who use CPAP have:  · A dry or stuffy nose and a sore throat. · Irritated skin on the face. · Sore eyes. · Bloating. If you have any of these problems, work with your doctor to fix them. Here are some things you can try:  · Be sure the mask or nasal prongs fit well. · See if your doctor can adjust the pressure of your CPAP. · If your nose is dry, try a humidifier.   · If your nose is runny or stuffy, try decongestant medicine or a steroid nasal spray. If these things do not help, you might try a different type of machine. Some machines have air pressure that adjusts on its own. Others have air pressures that are different when you breathe in than when you breathe out. This may reduce discomfort caused by too much pressure in your nose. Where can you learn more? Go to ExecNote.be  Enter Mora Lopez in the search box to learn more about \"Learning About CPAP for Sleep Apnea. \"   © 6220-5401 Healthwise, Incorporated. Care instructions adapted under license by [facility] [employer] (which disclaims liability or warranty for this information). This care instruction is for use with your licensed healthcare professional. If you have questions about a medical condition or this instruction, always ask your healthcare professional. [address] any warranty or liability for your use of this information. Content Version: 8.1.04482; Last Revised: January 11, 2010  PROPER SLEEP HYGIENE    What to avoid  · Do not have drinks with caffeine, such as coffee or black tea, for 8 hours before bed. · Do not smoke or use other types of tobacco near bedtime. Nicotine is a stimulant and can keep you awake. · Avoid drinking alcohol late in the evening, because it can cause you to wake in the middle of the night. · Do not eat a big meal close to bedtime. If you are hungry, eat a light snack. · Do not drink a lot of water close to bedtime, because the need to urinate may wake you up during the night. · Do not read or watch TV in bed. Use the bed only for sleeping and sexual activity. What to try  · Go to bed at the same time every night, and wake up at the same time every morning. Do not take naps during the day. · Keep your bedroom quiet, dark, and cool. · Get regular exercise, but not within 3 to 4 hours of your bedtime. .  · Sleep on a comfortable pillow and mattress.   · If watching the clock makes you anxious, turn it facing away from you so you cannot see the time. · If you worry when you lie down, start a worry book. Well before bedtime, write down your worries, and then set the book and your concerns aside. · Try meditation or other relaxation techniques before you go to bed. · If you cannot fall asleep, get up and go to another room until you feel sleepy. Do something relaxing. Repeat your bedtime routine before you go to bed again. · Make your house quiet and calm about an hour before bedtime. Turn down the lights, turn off the TV, log off the computer, and turn down the volume on music. This can help you relax after a busy day. Drowsy Driving: The [employer] cites drowsiness as a causing factor in more than 675,527 police reported crashes annually, resulting in 76,000 injuries and 1,500 deaths. Other surveys suggest 55% of people polled have driven while drowsy in the past year, 23% had fallen asleep but not crashed, 3% crashed, and 2% had and accident due to drowsy driving. Who is at risk? Young Drivers: One study of drowsy driving accidents states that 55% of the drivers were under 25 years. Of those, 75% were male. Shift Workers and Travelers: People who work overnight or travel across time zones frequently are at higher risk of experiencing Circadian Rhythm Disorders. They are trying to work and function when their body is programed to sleep. Sleep Deprived: Lack of sleep has a serious impact on your ability to pay attention or focus on a task. Consistently getting less than the average of 8 hours your body needs creates partial or cumulative sleep deprivation. Untreated Sleep Disorders: Sleep Apnea, Narcolepsy, R.L.S., and other sleep disorders (untreated) prevent a person from getting enough restful sleep. This leads to excessive daytime sleepiness and increases the risk for drowsy driving accidents by up to 7 times.   Medications / Alcohol: Even over the counter medications can cause drowsiness. Medications that impair a drivers attention should have a warning label. Alcohol naturally makes you sleepy and on its own can cause accidents. Combined with excessive drowsiness its effects are amplified. Signs of Drowsy Driving:   * You don't remember driving the last few miles   * You may drift out of your ursula   * You are unable to focus and your thoughts wander   * You may yawn more often than normal   * You have difficulty keeping your eyes open / nodding off   * Missing traffic signs, speeding, or tailgating  Prevention-   Good sleep hygiene, lifestyle and behavioral choices have the most impact on drowsy driving. There is no substitute for sleep and the average person requires 8 hours nightly. If you find yourself driving drowsy, stop and sleep. Consider the sleep hygiene tips provided during your visit as well. Medication Refill Policy: Refills for all medications require 1 week advance notice. Please have your pharmacy fax a refill request. We are unable to fax, or call in \"controled substance\" medications and you will need to pick these prescriptions up from our office. [employer] Activation    Thank you for requesting access to [employer]. Please follow the instructions below to securely access and download your online medical record. [employer] allows you to send messages to your doctor, view your test results, renew your prescriptions, schedule appointments, and more. How Do I Sign Up? 1. In your internet browser, go to [email or web address] 2. Click on the First Time User? Click Here link in the Sign In box. You will see the New Member Sign Up page. 3. Enter your [employer] Access Code exactly as it appears below. You will not need to use this code after youve completed the sign-up process. If you do not sign up before the expiration date, you must request a new code. [employer] Access Code:  Activation code not generated  Current Y[employer] Status: Active (This is the date your Yogi[employer] access code will )    4. Enter the last four digits of your Social Security Number (xxxx) and Date of Birth (mm/dd/yyyy) as indicated and click Submit. You will be taken to the next sign-up page. 5. Create a Yogiyo ID. This will be your [employer] login ID and cannot be changed, so think of one that is secure and easy to remember. 6. Create a Yogiyo password. You can change your password at any time. 7. Enter your Password Reset Question and Answer. This can be used at a later time if you forget your password. 8. Enter your e-mail address. You will receive e-mail notification when new information is available in [address] 9. Click Sign Up. You can now view and download portions of your medical record. 10. Click the Download Summary menu link to download a portable copy of your medical information. Additional Information    If you have questions, please call [phone]. Remember, [employer] is NOT to be used for urgent needs. For medical emergencies, dial 911. Starting a Weight Loss Plan: After Your Visit  Your Care Instructions  If you are thinking about losing weight, it can be hard to know where to start. Your doctor can help you set up a weight loss plan that best meets your needs. You may want to take a class on nutrition or exercise, or join a weight loss support group. If you have questions about how to make changes to your eating or exercise habits, ask your doctor about seeing a registered dietitian or an exercise specialist.  It can be a big challenge to lose weight. But you do not have to make huge changes at once. Make small changes, and stick with them. When those changes become habit, add a few more changes. If you do not think you are ready to make changes right now, try to pick a date in the future.  Make an appointment to see your doctor to discuss whether the time is right for you to start a plan.  Follow-up care is a key part of your treatment and safety. Be sure to make and go to all appointments, and call your doctor if you are having problems. It's also a good idea to know your test results and keep a list of the medicines you take. How can you care for yourself at home? · Set realistic goals. Many people expect to lose much more weight than is likely. A weight loss of 5% to 10% of your body weight may be enough to improve your health. · Get family and friends involved to provide support. Talk to them about why you are trying to lose weight, and ask them to help. They can help by participating in exercise and having meals with you, even if they may be eating something different. · Find what works best for you. If you do not have time or do not like to cook, a program that offers meal replacement bars or shakes may be better for you. Or if you like to prepare meals, finding a plan that includes daily menus and recipes may be best.  · Ask your doctor about other health professionals who can help you achieve your weight loss goals. ¨ A dietitian can help you make healthy changes in your diet. ¨ An exercise specialist or  can help you develop a safe and effective exercise program.  ¨ A counselor or psychiatrist can help you cope with issues such as depression, anxiety, or family problems that can make it hard to focus on weight loss. · Consider joining a support group for people who are trying to lose weight. Your doctor can suggest groups in your area. Where can you learn more? Go to [employer].be  Enter U357 in the search box to learn more about \"Starting a Weight Loss Plan: After Your Visit. \"   © 3176-6485 Healthwise, Incorporated. Care instructions adapted under license by Jessika Garcia (which disclaims liability or warranty for this information).  This care instruction is for use with your licensed healthcare professional. If you have questions about a medical condition or this instruction, always ask your healthcare professional. [place] [address] any warranty or liability for your use of this information.   Content Version: 4.0.71041; Last Revised: September 1, 2009

## 2017-03-02 NOTE — MR AVS SNAPSHOT
Visit Information Date & Time Provider Department Dept. Phone Encounter #  
 3/2/2017  9:40 AM Idania Ball MD [facility] [address] [phone] [id number] Follow-up Instructions Return in about 1 year (around 3/2/2018), or if symptoms worsen or fail to improve. Follow-up and Disposition History Upcoming Health Maintenance Date Due  
 MEDICARE YEARLY EXAM 9/28/2017 GLAUCOMA SCREENING Q2Y 12/9/2017 COLONOSCOPY 10/2/2019 DTaP/Tdap/Td series (2 - Td) 11/21/2021 Allergies as of 3/2/2017  Review Complete On: 3/2/2017 By: Idania Ball MD  
 No Known Allergies Current Immunizations  Reviewed on 10/27/2016 Name Date Pneumococcal Conjugate (PCV-13) 1/26/2017 Pneumococcal Vaccine (Unspecified Type) 12/3/2012 TD Vaccine 9/22/2002 TDAP Vaccine 11/21/2011 Zoster Vaccine, Live 3/1/2014 Not reviewed this visit You Were Diagnosed With   
  
 Codes Comments CLARICE (obstructive sleep apnea)    -  Primary ICD-10-CM: G47.33 
ICD-9-CM: 327.23 Obesity (BMI 30-39. 9)     ICD-10-CM: E66.9 ICD-9-CM: 278.00 Vitals BP  
  
  
  
  
  
 134/84 Vitals History BMI and BSA Data Body Mass Index Body Surface Area  
 32.69 kg/m 2 2.16 m 2 Preferred Pharmacy Pharmacy Name Phone [address] [phone] Your Updated Medication List  
  
   
This list is accurate as of: 3/2/17 10:35 AM.  Always use your most recent med list.  
  
  
  
  
 Cholecalciferol (Vitamin D3) 50,000 unit Cap Take 1 Cap by mouth every seven (7) days for 8 doses. For 8 weeks  
  
 cpap machine kit  
by Does Not Apply route. fluticasone 50 mcg/actuation nasal spray Commonly known as:  Imagene Poot 2 Sprays by Both Nostrils route daily. irbesartan 300 mg tablet Commonly known as:  AVAPRO Take 1 Tab by mouth nightly. Replaces losartan 9/29/16 * raNITIdine 300 mg tablet Commonly known as:  ZANTAC  
take 1 tablet by mouth once daily for HEARTBURN  
  
 * raNITIdine 300 mg tablet Commonly known as:  ZANTAC Take 1 Tab by mouth daily. Indications: HEARTBURN  
  
 * Notice: This list has 2 medication(s) that are the same as other medications prescribed for you. Read the directions carefully, and ask your doctor or other care provider to review them with you. Follow-up Instructions Return in about 1 year (around 3/2/2018), or if symptoms worsen or fail to improve. Patient Instructions [address] [place]. [address], [address] Tel.  [phone] Fax. [address] [facility] [address] [place], [address] [facility] Tel.  [phone] Fax. [phone] [address] [address] Tel.  [phone] Fax. [phone] Learning About CPAP for Sleep Apnea What is CPAP? CPAP is a small machine that you use at home every night while you sleep. It increases air pressure in your throat to keep your airway open. When you have sleep apnea, this can help you sleep better so you feel much better. CPAP stands for \"continuous positive airway pressure. \" The CPAP machine will have one of the following: · A mask that covers your nose and mouth · Prongs that fit into your nose · A mask that covers your nose only, the most common type. This type is called NCPAP. The N stands for \"nasal.\" Why is it done? CPAP is usually the best treatment for obstructive sleep apnea. It is the first treatment choice and the most widely used. Your doctor may suggest CPAP if you have: · Moderate to severe sleep apnea. · Sleep apnea and coronary artery disease (CAD) or heart failure. How does it help? · CPAP can help you have more normal sleep, so you feel less sleepy and more alert during the daytime. · CPAP may help keep heart failure or other heart problems from getting worse. · NCPAP may help lower your blood pressure. · If you use CPAP, your bed partner may also sleep better because you are not snoring or restless. What are the side effects? Some people who use CPAP have: · A dry or stuffy nose and a sore throat. · Irritated skin on the face. · Sore eyes. · Bloating. If you have any of these problems, work with your doctor to fix them. Here are some things you can try: · Be sure the mask or nasal prongs fit well. · See if your doctor can adjust the pressure of your CPAP. · If your nose is dry, try a humidifier. · If your nose is runny or stuffy, try decongestant medicine or a steroid nasal spray. If these things do not help, you might try a different type of machine. Some machines have air pressure that adjusts on its own. Others have air pressures that are different when you breathe in than when you breathe out. This may reduce discomfort caused by too much pressure in your nose. Where can you learn more? Go to [employer].be Enter W240 in the search box to learn more about \"Learning About CPAP for Sleep Apnea. \"  
© 5778-5363 Healthwise, Incorporated. Care instructions adapted under license by [employer] (which disclaims liability or warranty for this information). This care instruction is for use with your licensed healthcare professional. If you have questions about a medical condition or this instruction, always ask your healthcare professional. [place] [address] any warranty or liability for your use of this information. Content Version: 4.7.79583; Last Revised: January 11, 2010 PROPER SLEEP HYGIENE What to avoid · Do not have drinks with caffeine, such as coffee or black tea, for 8 hours before bed. · Do not smoke or use other types of tobacco near bedtime. Nicotine is a stimulant and can keep you awake. · Avoid drinking alcohol late in the evening, because it can cause you to wake in the middle of the night. · Do not eat a big meal close to bedtime. If you are hungry, eat a light snack. · Do not drink a lot of water close to bedtime, because the need to urinate may wake you up during the night. · Do not read or watch TV in bed. Use the bed only for sleeping and sexual activity. What to try · Go to bed at the same time every night, and wake up at the same time every morning. Do not take naps during the day. · Keep your bedroom quiet, dark, and cool. · Get regular exercise, but not within 3 to 4 hours of your bedtime. Sudie Mar · Sleep on a comfortable pillow and mattress. · If watching the clock makes you anxious, turn it facing away from you so you cannot see the time. · If you worry when you lie down, start a worry book. Well before bedtime, write down your worries, and then set the book and your concerns aside. · Try meditation or other relaxation techniques before you go to bed. · If you cannot fall asleep, get up and go to another room until you feel sleepy. Do something relaxing. Repeat your bedtime routine before you go to bed again. · Make your house quiet and calm about an hour before bedtime. Turn down the lights, turn off the TV, log off the computer, and turn down the volume on music. This can help you relax after a busy day. Drowsy Driving: The [employer] cites drowsiness as a causing factor in more than 025,332 police reported crashes annually, resulting in 76,000 injuries and 1,500 deaths. Other surveys suggest 55% of people polled have driven while drowsy in the past year, 23% had fallen asleep but not crashed, 3% crashed, and 2% had and accident due to drowsy driving. Who is at risk? Young Drivers: One study of drowsy driving accidents states that 55% of the drivers were under 25 years. Of those, 75% were male. Shift Workers and Travelers: People who work overnight or travel across time zones frequently are at higher risk of experiencing Circadian Rhythm Disorders. They are trying to work and function when their body is programed to sleep. Sleep Deprived: Lack of sleep has a serious impact on your ability to pay attention or focus on a task. Consistently getting less than the average of 8 hours your body needs creates partial or cumulative sleep deprivation. Untreated Sleep Disorders: Sleep Apnea, Narcolepsy, R.L.S., and other sleep disorders (untreated) prevent a person from getting enough restful sleep. This leads to excessive daytime sleepiness and increases the risk for drowsy driving accidents by up to 7 times. Medications / Alcohol: Even over the counter medications can cause drowsiness. Medications that impair a drivers attention should have a warning label. Alcohol naturally makes you sleepy and on its own can cause accidents. Combined with excessive drowsiness its effects are amplified. Signs of Drowsy Driving: * You don't remember driving the last few miles * You may drift out of your ursula * You are unable to focus and your thoughts wander * You may yawn more often than normal 
 * You have difficulty keeping your eyes open / nodding off * Missing traffic signs, speeding, or tailgating Prevention-  
Good sleep hygiene, lifestyle and behavioral choices have the most impact on drowsy driving. There is no substitute for sleep and the average person requires 8 hours nightly. If you find yourself driving drowsy, stop and sleep. Consider the sleep hygiene tips provided during your visit as well. Medication Refill Policy: Refills for all medications require 1 week advance notice. Please have your pharmacy fax a refill request. We are unable to fax, or call in \"controled substance\" medications and you will need to pick these prescriptions up from our office. MyChart Activation Thank you for requesting access to Bookatable (Livebookings). Please follow the instructions below to securely access and download your online medical record. Bookatable (Livebookings) allows you to send messages to your doctor, view your test results, renew your prescriptions, schedule appointments, and more. How Do I Sign Up? 1. In your internet browser, go to [email or web address]. 2. Click on the First Time User? Click Here link in the Sign In box. You will see the New Member Sign Up page. 3. Enter your Raizlabst Access Code exactly as it appears below. You will not need to use this code after youve completed the sign-up process. If you do not sign up before the expiration date, you must request a new code. Bookatable (Livebookings) Access Code: Activation code not generated Current Bookatable (Livebookings) Status: Active (This is the date your Bookatable (Livebookings) access code will ) 4. Enter the last four digits of your Social Security Number (xxxx) and Date of Birth (mm/dd/yyyy) as indicated and click Submit. You will be taken to the next sign-up page. 5. Create a Bookatable (Livebookings) ID. This will be your Bookatable (Livebookings) login ID and cannot be changed, so think of one that is secure and easy to remember. 6. Create a Bookatable (Livebookings) password. You can change your password at any time. 7. Enter your Password Reset Question and Answer. This can be used at a later time if you forget your password. 8. Enter your e-mail address. You will receive e-mail notification when new information is available in [address] 9. Click Sign Up. You can now view and download portions of your medical record. 10. Click the Download Summary menu link to download a portable copy of your medical information. Additional Information If you have questions, please call [phone]. Remember, Bookatable (Livebookings) is NOT to be used for urgent needs. For medical emergencies, dial 911. Starting a Weight Loss Plan: After Your Visit Your Care Instructions If you are thinking about losing weight, it can be hard to know where to start. Your doctor can help you set up a weight loss plan that best meets your needs. You may want to take a class on nutrition or exercise, or join a weight loss support group. If you have questions about how to make changes to your eating or exercise habits, ask your doctor about seeing a registered dietitian or an exercise specialist. 
It can be a big challenge to lose weight. But you do not have to make huge changes at once. Make small changes, and stick with them. When those changes become habit, add a few more changes. If you do not think you are ready to make changes right now, try to pick a date in the future. Make an appointment to see your doctor to discuss whether the time is right for you to start a plan. Follow-up care is a key part of your treatment and safety. Be sure to make and go to all appointments, and call your doctor if you are having problems. It's also a good idea to know your test results and keep a list of the medicines you take. How can you care for yourself at home? · Set realistic goals. Many people expect to lose much more weight than is likely. A weight loss of 5% to 10% of your body weight may be enough to improve your health. · Get family and friends involved to provide support. Talk to them about why you are trying to lose weight, and ask them to help. They can help by participating in exercise and having meals with you, even if they may be eating something different. · Find what works best for you. If you do not have time or do not like to cook, a program that offers meal replacement bars or shakes may be better for you. Or if you like to prepare meals, finding a plan that includes daily menus and recipes may be best. 
· Ask your doctor about other health professionals who can help you achieve your weight loss goals. ¨ A dietitian can help you make healthy changes in your diet. ¨ An exercise specialist or  can help you develop a safe and effective exercise program. 
¨ A counselor or psychiatrist can help you cope with issues such as depression, anxiety, or family problems that can make it hard to focus on weight loss. · Consider joining a support group for people who are trying to lose weight. Your doctor can suggest groups in your area. Where can you learn more? Go to [employer].be Enter D195 in the search box to learn more about \"Starting a Weight Loss Plan: After Your Visit. \"  
© 4727-4128 Healthwise, Incorporated. Care instructions adapted under license by Latisha Ramos (which disclaims liability or warranty for this information). This care instruction is for use with your licensed healthcare professional. If you have questions about a medical condition or this instruction, always ask your healthcare professional. [address] any warranty or liability for your use of this information. Content Version: 4.0.02395; Last Revised: September 1, 2009 Introducing [facility] SERVICES! Dear Dagoberto Vu: Thank you for requesting a Highlighter account. Our records indicate that you already have an active Highlighter account. You can access your account anytime at [email or web address] Did you know that you can access your hospital and ER discharge instructions at any time in Highlighter? You can also review all of your test results from your hospital stay or ER visit. Additional Information If you have questions, please visit the Frequently Asked Questions section of the Highlighter website at [email or web address] Remember, Highlighter is NOT to be used for urgent needs. For medical emergencies, dial 911. Now available from your iPhone and Android! Please provide this summary of care documentation to your next provider. Your primary care clinician is listed as Debbie Fox. If you have any questions after today's visit, please call [phone].

## 2017-03-06 ENCOUNTER — TELEPHONE (OUTPATIENT)
Dept: INTERNAL MEDICINE CLINIC | Age: 72
End: 2017-03-06

## 2017-03-06 RX ORDER — IRBESARTAN 300 MG/1
300 TABLET ORAL
Qty: 30 TAB | Refills: 2 | Status: SHIPPED | OUTPATIENT
Start: 2017-03-06 | End: 2017-06-05 | Stop reason: SDUPTHER

## 2017-03-06 NOTE — TELEPHONE ENCOUNTER
----- Message from Kyle Byrne sent at 3/6/2017  3:55 PM EST -----  Regarding: Dr. Christine Jeffrey refill  Pt is requesting a refill on bp medication to be sent to the [employer] [facility] on file. Pt didn't have the name of medication. Best contact number  [phone].

## 2017-03-08 ENCOUNTER — HOSPITAL ENCOUNTER (EMERGENCY)
Age: 72
Discharge: HOME | End: 2017-03-08
Attending: EMERGENCY MEDICINE | Admitting: EMERGENCY MEDICINE
Payer: MEDICARE

## 2017-03-08 ENCOUNTER — APPOINTMENT (OUTPATIENT)
Dept: CT IMAGING | Age: 72
End: 2017-03-08
Attending: EMERGENCY MEDICINE
Payer: MEDICARE

## 2017-03-08 ENCOUNTER — APPOINTMENT (OUTPATIENT)
Dept: GENERAL RADIOLOGY | Age: 72
End: 2017-03-08
Attending: EMERGENCY MEDICINE
Payer: MEDICARE

## 2017-03-08 VITALS
TEMPERATURE: 97.6 F | OXYGEN SATURATION: 96 % | HEIGHT: 68 IN | SYSTOLIC BLOOD PRESSURE: 152 MMHG | BODY MASS INDEX: 31.83 KG/M2 | DIASTOLIC BLOOD PRESSURE: 96 MMHG | RESPIRATION RATE: 21 BRPM | WEIGHT: 210 LBS | HEART RATE: 83 BPM

## 2017-03-08 DIAGNOSIS — R07.9 ACUTE CHEST PAIN: Primary | ICD-10-CM

## 2017-03-08 DIAGNOSIS — R09.1 PLEURISY: ICD-10-CM

## 2017-03-08 LAB
ALBUMIN SERPL BCP-MCNC: 3.4 G/DL (ref 3.5–5)
ALBUMIN/GLOB SERPL: 1 {RATIO} (ref 1.1–2.2)
ALP SERPL-CCNC: 135 U/L (ref 45–117)
ALT SERPL-CCNC: 29 U/L (ref 12–78)
ANION GAP BLD CALC-SCNC: 7 MMOL/L (ref 5–15)
AST SERPL W P-5'-P-CCNC: 25 U/L (ref 15–37)
ATRIAL RATE: 63 BPM
ATTENDING PHYSICIAN, CST07: NORMAL
BASOPHILS # BLD AUTO: 0 K/UL (ref 0–0.1)
BASOPHILS # BLD: 1 % (ref 0–1)
BILIRUB SERPL-MCNC: 0.7 MG/DL (ref 0.2–1)
BUN SERPL-MCNC: 13 MG/DL (ref 6–20)
BUN/CREAT SERPL: 13 (ref 12–20)
CALCIUM SERPL-MCNC: 7.9 MG/DL (ref 8.5–10.1)
CALCULATED P AXIS, ECG09: 32 DEGREES
CALCULATED R AXIS, ECG10: -22 DEGREES
CALCULATED T AXIS, ECG11: -6 DEGREES
CHLORIDE SERPL-SCNC: 106 MMOL/L (ref 97–108)
CK MB CFR SERPL CALC: 1 % (ref 0–2.5)
CK MB CFR SERPL CALC: 1.1 % (ref 0–2.5)
CK MB SERPL-MCNC: 1.7 NG/ML (ref 5–25)
CK MB SERPL-MCNC: 1.9 NG/ML (ref 5–25)
CK SERPL-CCNC: 158 U/L (ref 39–308)
CK SERPL-CCNC: 183 U/L (ref 39–308)
CO2 SERPL-SCNC: 29 MMOL/L (ref 21–32)
CREAT SERPL-MCNC: 0.97 MG/DL (ref 0.7–1.3)
D DIMER PPP FEU-MCNC: 2.23 MG/L FEU (ref 0–0.65)
DIAGNOSIS, 93000: NORMAL
DIAGNOSIS, 93000: NORMAL
DUKE TM SCORE RESULT, CST14: NORMAL
DUKE TREADMILL SCORE, CST13: NORMAL
ECG INTERP BEFORE EX, CST11: NORMAL
ECG INTERP DURING EX, CST12: NORMAL
EOSINOPHIL # BLD: 0.2 K/UL (ref 0–0.4)
EOSINOPHIL NFR BLD: 3 % (ref 0–7)
ERYTHROCYTE [DISTWIDTH] IN BLOOD BY AUTOMATED COUNT: 13.4 % (ref 11.5–14.5)
FUNCTIONAL CAPACITY, CST17: NORMAL
GLOBULIN SER CALC-MCNC: 3.4 G/DL (ref 2–4)
GLUCOSE SERPL-MCNC: 88 MG/DL (ref 65–100)
HCT VFR BLD AUTO: 41.1 % (ref 36.6–50.3)
HGB BLD-MCNC: 13.3 G/DL (ref 12.1–17)
KNOWN CARDIAC CONDITION, CST08: NORMAL
LIPASE SERPL-CCNC: 114 U/L (ref 73–393)
LYMPHOCYTES # BLD AUTO: 20 % (ref 12–49)
LYMPHOCYTES # BLD: 1.2 K/UL (ref 0.8–3.5)
MAGNESIUM SERPL-MCNC: 2 MG/DL (ref 1.6–2.4)
MAX. DIASTOLIC BP, CST04: 85 MMHG
MAX. HEART RATE, CST05: 160 BPM
MAX. SYSTOLIC BP, CST03: 180 MMHG
MCH RBC QN AUTO: 30.4 PG (ref 26–34)
MCHC RBC AUTO-ENTMCNC: 32.4 G/DL (ref 30–36.5)
MCV RBC AUTO: 94.1 FL (ref 80–99)
MONOCYTES # BLD: 0.6 K/UL (ref 0–1)
MONOCYTES NFR BLD AUTO: 10 % (ref 5–13)
NEUTS SEG # BLD: 4.1 K/UL (ref 1.8–8)
NEUTS SEG NFR BLD AUTO: 66 % (ref 32–75)
OVERALL BP RESPONSE TO EXERCISE, CST16: NORMAL
OVERALL HR RESPONSE TO EXERCISE, CST15: NORMAL
P-R INTERVAL, ECG05: 196 MS
PEAK EX METS, CST10: 8.2 METS
PLATELET # BLD AUTO: 185 K/UL (ref 150–400)
POTASSIUM SERPL-SCNC: 3.8 MMOL/L (ref 3.5–5.1)
PROT SERPL-MCNC: 6.8 G/DL (ref 6.4–8.2)
PROTOCOL NAME, CST01: NORMAL
Q-T INTERVAL, ECG07: 422 MS
QRS DURATION, ECG06: 106 MS
QTC CALCULATION (BEZET), ECG08: 431 MS
RBC # BLD AUTO: 4.37 M/UL (ref 4.1–5.7)
SODIUM SERPL-SCNC: 142 MMOL/L (ref 136–145)
TEST INDICATION, CST09: NORMAL
TROPONIN I SERPL-MCNC: <0.04 NG/ML
TROPONIN I SERPL-MCNC: <0.04 NG/ML
VENTRICULAR RATE, ECG03: 63 BPM
WBC # BLD AUTO: 6.1 K/UL (ref 4.1–11.1)

## 2017-03-08 PROCEDURE — 80053 COMPREHEN METABOLIC PANEL: CPT | Performed by: EMERGENCY MEDICINE

## 2017-03-08 PROCEDURE — 84484 ASSAY OF TROPONIN QUANT: CPT | Performed by: EMERGENCY MEDICINE

## 2017-03-08 PROCEDURE — 93005 ELECTROCARDIOGRAM TRACING: CPT

## 2017-03-08 PROCEDURE — 74011636320 HC RX REV CODE- 636/320: Performed by: RADIOLOGY

## 2017-03-08 PROCEDURE — 71020 XR CHEST PA LAT: CPT

## 2017-03-08 PROCEDURE — C8930 TTE W OR W/O CONTR, CONT ECG: HCPCS

## 2017-03-08 PROCEDURE — 85025 COMPLETE CBC W/AUTO DIFF WBC: CPT | Performed by: EMERGENCY MEDICINE

## 2017-03-08 PROCEDURE — 71275 CT ANGIOGRAPHY CHEST: CPT

## 2017-03-08 PROCEDURE — 74011250636 HC RX REV CODE- 250/636: Performed by: SPECIALIST

## 2017-03-08 PROCEDURE — 85379 FIBRIN DEGRADATION QUANT: CPT | Performed by: EMERGENCY MEDICINE

## 2017-03-08 PROCEDURE — 99285 EMERGENCY DEPT VISIT HI MDM: CPT

## 2017-03-08 PROCEDURE — 82550 ASSAY OF CK (CPK): CPT | Performed by: EMERGENCY MEDICINE

## 2017-03-08 PROCEDURE — 83735 ASSAY OF MAGNESIUM: CPT | Performed by: EMERGENCY MEDICINE

## 2017-03-08 PROCEDURE — 36415 COLL VENOUS BLD VENIPUNCTURE: CPT | Performed by: EMERGENCY MEDICINE

## 2017-03-08 PROCEDURE — 83690 ASSAY OF LIPASE: CPT | Performed by: EMERGENCY MEDICINE

## 2017-03-08 RX ADMIN — PERFLUTREN 2 ML: 6.52 INJECTION, SUSPENSION INTRAVENOUS at 14:50

## 2017-03-08 RX ADMIN — IOPAMIDOL 85 ML: 755 INJECTION, SOLUTION INTRAVENOUS at 12:41

## 2017-03-08 NOTE — ED PROVIDER NOTES
HPI Comments: 81 y.o. male with extensive past medical history, please see list, significant for HTN, CLARICE, squamous cell carcinoma, GERD, sinus bradycardia, esophageal motility disorder, panic attacks, and hernia who presents to the ED with chief complaint of chest pain. Pt reports intermittent left sided chest pain that he describes as \"heaviness\" onset a couple days ago that worsened this morning after he got up. Pt states his chest pain is pleuritic, says his pain was 6-7/10 at its worst and is currently improved to 2-3/10. Pt states his chest pain is accompanied by intermittent mild SOB and some congestion. Pt states he took 324 mg aspirin. Pt denies hx of cardiac issues but says he has family hx of MI. Pt states he has had a negative stress test in the past but says it was several years ago. Pt denies nausea, vomiting, diaphoresis, leg pain, new leg swelling, cough, or fever. There are no other acute medical complaints voiced at this time. Social Hx: Denies smoking tobacco. Occasional EtOH use. PCP: Lourdes Villareal MD    Note written by Geovanny Hdez, as dictated by Mike Eddy MD 10:06 AM     The history is provided by the patient and the spouse. Past Medical History:   Diagnosis Date    Abnormal celiac antibody panel 01/2017    Ankle fracture, left 6/29/15    Dr. Kayla Elder. and prox fibula. s/p surgery    AR (allergic rhinitis)     Arthritis     ritesh hands    Elevated prostate specific antigen (PSA)     Dr. Danna Larson. Dr. Valerie Moseley peak 14.3, bx 5/2008, 2006    Esophageal motility disorder 01/2017    +hiatal hernia    GERD (gastroesophageal reflux disease)     Hearing loss     hearing test 4/2009    HTN (hypertension)     diet treated, stress echo 12/07 nl    Left leg paresthesias     medical left leg. since left leg fracuture 6/2015    Onychomycosis     CLARICE (obstructive sleep apnea) 2003    Dr. Ceasar Bowman.  could not tolerate C-PAP    Panic attacks 2002    work-related    Right inguinal hernia     Sinus bradycardia     Squamous cell carcinoma (HCC) 1989    left temple Dr. Penny Rivers    UTI (urinary tract infection) 4/008       Past Surgical History:   Procedure Laterality Date    COLONOSCOPY  2002    normal except hemorrhoids    HX ANKLE FRACTURE TX Left 6/29/15    Dr. Saul Face    HX COLONOSCOPY  10/3/14    1 hyperplastic polyp. Milka    Andrew HERNIA REPAIR Bilateral 12/19/2014    Dr. Renee Morales OTHER SURGICAL      Laparoscopic bilateral inguinal hernia repair with mesh    HX TONSILLECTOMY      HX UROLOGICAL  2005 2008    prostate biopsy x 2- benign    PROSTATE BIOPSIES  2006, 2008    saw Dr. Carlos Lamar. Now Dr. 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 Review of Systems   Constitutional: Negative for diaphoresis and fever. HENT: Positive for congestion. Respiratory: Positive for shortness of breath. Negative for cough. Cardiovascular: Positive for chest pain (left side). Negative for leg swelling. Gastrointestinal: Negative for nausea and vomiting. All other systems reviewed and are negative. Vitals:    03/08/17 1011   BP: 144/86   Pulse: (!) 58   Resp: 16   Temp: 97.6 °F (36.4 °C)   SpO2: 95%   Weight: 95.3 kg (210 lb)   Height: 5' 8\" (1.727 m)            Physical Exam   Physical Examination: General appearance - alert, well appearing, and in no distress, oriented to person, place, and time and normal appearing weight  Eyes - pupils equal and reactive, extraocular eye movements intact  Neck - supple, no significant adenopathy  Chest - clear to auscultation, no wheezes, rales or rhonchi, symmetric air entry  Heart - normal rate, regular rhythm, normal S1, S2, no murmurs, rubs, clicks or gallops  Abdomen - soft, nontender, nondistended, no masses or organomegaly  Back exam - full range of motion, no tenderness, palpable spasm or pain on motion  Neurological - alert, oriented, normal speech, no focal findings or movement disorder noted  Musculoskeletal - no joint tenderness, deformity or swelling  Extremities - peripheral pulses normal, no pedal edema, no clubbing or cyanosis  Skin - normal coloration and turgor, no rashes, no suspicious skin lesions noted  MDM  Number of Diagnoses or Management Options     Amount and/or Complexity of Data Reviewed  Clinical lab tests: ordered and reviewed  Tests in the radiology section of CPT®: ordered and reviewed  Decide to obtain previous medical records or to obtain history from someone other than the patient: yes  Obtain history from someone other than the patient: yes (wife)  Review and summarize past medical records: yes  Independent visualization of images, tracings, or specimens: yes      ED Course       Procedures    EKG interpretation: (Preliminary)  Rhythm: normal sinus rhythm; and regular .  Rate (approx.): 63; Axis: left axis deviation; HI interval: normal; QRS interval: normal ; ST/T wave: non-specific changes; q waves III, aVF, no changes from previous EKG 2/11/2016    Pt with left sided chest pain worse with deep breathing. CT PE negative, EKG and 2 sets CE WNL, stress test WNL. Will f/u with pcp or return to ED for worsening symptoms. Suspect pleurisy.

## 2017-03-08 NOTE — ED TRIAGE NOTES
82 y/o male presents to ED complaining of intermittent left mid/lower chest pain x 2 days. States having labored breathing this morning due to pain.

## 2017-03-08 NOTE — ED NOTES
Patient discharged by MD. Patient given the opportunity to ask questions, verbalized understanding of teaching.

## 2017-03-08 NOTE — DISCHARGE INSTRUCTIONS
Pleurisy: Care Instructions  Your Care Instructions  Pleurisy is inflammation of the tissue that lines the inside of the chest and covers the lungs (pleura). Pleurisy is often caused by an infection, usually a virus. It also can be caused by other health problems, such as pneumonia or lupus. Pleurisy can cause sharp chest pain that gets worse when you cough or take a deep breath. You may need more tests to find out what is causing your pleurisy. Treatment depends on the cause. Pleurisy may come and go for a few days, or it may continue if the cause has not been treated. Home treatment can help ease symptoms. Follow-up care is a key part of your treatment and safety. Be sure to make and go to all appointments, and call your doctor if you are having problems. Its also a good idea to know your test results and keep a list of the medicines you take. How can you care for yourself at home? · Take an over-the-counter pain medicine, such as acetaminophen (Tylenol), ibuprofen (Advil, Motrin), or naproxen (Aleve). Read and follow all instructions on the label. · Do not take two or more pain medicines at the same time unless the doctor told you to. Many pain medicines have acetaminophen, which is Tylenol. Too much acetaminophen (Tylenol) can be harmful. · If your doctor prescribed antibiotics, take them as directed. Do not stop taking them just because you feel better. You need to take the full course of antibiotics. · Take cough medicine as directed if your doctor recommends it. · Avoid activities that make the pain worse. When should you call for help? Call 911 anytime you think you may need emergency care. For example, call if:  · You have severe trouble breathing. · You have severe chest pain. · You passed out (lost consciousness). Call your doctor now or seek immediate medical care if:  · You have a new or higher fever.   Watch closely for changes in your health, and be sure to contact your doctor if:  · You begin to cough up yellow or green mucus. · You cough up blood. · Your symptoms are not better in 3 or 4 days. Where can you learn more? Go to http://nasir-abdi.info/. Enter F346 in the search box to learn more about \"Pleurisy: Care Instructions. \"  Current as of: May 23, 2016  Content Version: 11.1  © 5014-6775 [employer]. Care instructions adapted under license by [employer] (which disclaims liability or warranty for this information). If you have questions about a medical condition or this instruction, always ask your healthcare professional. [place] [address] any warranty or liability for your use of this information. Chest Pain: Care Instructions  Your Care Instructions  There are many things that can cause chest pain. Some are not serious and will get better on their own in a few days. But some kinds of chest pain need more testing and treatment. Your doctor may have recommended a follow-up visit in the next 8 to 12 hours. If you are not getting better, you may need more tests or treatment. Even though your doctor has released you, you still need to watch for any problems. The doctor carefully checked you, but sometimes problems can develop later. If you have new symptoms or if your symptoms do not get better, get medical care right away. If you have worse or different chest pain or pressure that lasts more than 5 minutes or you passed out (lost consciousness), call 911 or seek other emergency help right away. A medical visit is only one step in your treatment. Even if you feel better, you still need to do what your doctor recommends, such as going to all suggested follow-up appointments and taking medicines exactly as directed. This will help you recover and help prevent future problems. How can you care for yourself at home? · Rest until you feel better. · Take your medicine exactly as prescribed.  Call your doctor if you think you are having a problem with your medicine. · Do not drive after taking a prescription pain medicine. When should you call for help? Call 911 if:  · You passed out (lost consciousness). · You have severe difficulty breathing. · You have symptoms of a heart attack. These may include:  ¨ Chest pain or pressure, or a strange feeling in your chest.  ¨ Sweating. ¨ Shortness of breath. ¨ Nausea or vomiting. ¨ Pain, pressure, or a strange feeling in your back, neck, jaw, or upper belly or in one or both shoulders or arms. ¨ Lightheadedness or sudden weakness. ¨ A fast or irregular heartbeat. After you call 911, the  may tell you to chew 1 adult-strength or 2 to 4 low-dose aspirin. Wait for an ambulance. Do not try to drive yourself. Call your doctor today if:  · You have any trouble breathing. · Your chest pain gets worse. · You are dizzy or lightheaded, or you feel like you may faint. · You are not getting better as expected. · You are having new or different chest pain. Where can you learn more? Go to http://nasir-abdi.info/. Enter A120 in the search box to learn more about \"Chest Pain: Care Instructions. \"  Current as of: May 27, 2016  Content Version: 11.1  © 9803-1932 [employer]. Care instructions adapted under license by [employer] (which disclaims liability or warranty for this information). If you have questions about a medical condition or this instruction, always ask your healthcare professional. [place] [address] any warranty or liability for your use of this information. We hope that we have addressed all of your medical concerns. The examination and treatment you received in the Emergency Department were for an emergent problem and were not intended as complete care. It is important that you follow up with your healthcare provider(s) for ongoing care.  If your symptoms worsen or do not improve as expected, and you are unable to reach your usual health care provider(s), you should return to the Emergency Department. Today's healthcare is undergoing tremendous change, and patient satisfaction surveys are one of the many tools to assess the quality of medical care. You may receive a survey from the [employer] regarding your experience in the Emergency Department. I hope that your experience has been completely positive, particularly the medical care that I provided. As such, please participate in the survey; anything less than excellent does not meet my expectations or intentions. [facility] and [facility] participate in nationally recognized quality of care measures. If your blood pressure is greater than 120/80, as reported below, we urge that you seek medical care to address the potential of high blood pressure, commonly known as hypertension. Hypertension can be hereditary or can be caused by certain medical conditions, pain, stress, or \"white coat syndrome. \"       Please make an appointment with your health care provider(s) for follow up of your Emergency Department visit. VITALS:   Patient Vitals for the past 8 hrs:   Temp Pulse Resp BP SpO2   03/08/17 1400 - (!) 59 - 145/78 96 %   03/08/17 1345 - (!) 56 16 154/88 97 %   03/08/17 1330 - (!) 57 25 (!) 157/91 97 %   03/08/17 1315 - (!) 56 19 159/83 99 %   03/08/17 1215 - (!) 59 15 149/81 96 %   03/08/17 1200 - (!) 53 13 141/80 95 %   03/08/17 1145 - (!) 57 18 150/83 99 %   03/08/17 1130 - (!) 58 14 152/78 94 %   03/08/17 1115 - (!) 54 16 138/83 96 %   03/08/17 1011 97.6 °F (36.4 °C) (!) 58 16 144/86 95 %          Thank you for allowing us to provide you with medical care today. We realize that you have many choices for your emergency care needs. Please choose us in the future for any continued health care needs. Bre Zuniga, [address] [facility].   Office: [phone]            Recent Results (from the past 24 hour(s))   CBC WITH AUTOMATED DIFF    Collection Time: 03/08/17 10:48 AM   Result Value Ref Range    WBC 6.1 4.1 - 11.1 K/uL    RBC 4.37 4.10 - 5.70 M/uL    HGB 13.3 12.1 - 17.0 g/dL    HCT 41.1 36.6 - 50.3 %    MCV 94.1 80.0 - 99.0 FL    MCH 30.4 26.0 - 34.0 PG    MCHC 32.4 30.0 - 36.5 g/dL    RDW 13.4 11.5 - 14.5 %    PLATELET 437 410 - 028 K/uL    NEUTROPHILS 66 32 - 75 %    LYMPHOCYTES 20 12 - 49 %    MONOCYTES 10 5 - 13 %    EOSINOPHILS 3 0 - 7 %    BASOPHILS 1 0 - 1 %    ABS. NEUTROPHILS 4.1 1.8 - 8.0 K/UL    ABS. LYMPHOCYTES 1.2 0.8 - 3.5 K/UL    ABS. MONOCYTES 0.6 0.0 - 1.0 K/UL    ABS. EOSINOPHILS 0.2 0.0 - 0.4 K/UL    ABS. BASOPHILS 0.0 0.0 - 0.1 K/UL   METABOLIC PANEL, COMPREHENSIVE    Collection Time: 03/08/17 10:48 AM   Result Value Ref Range    Sodium 142 136 - 145 mmol/L    Potassium 3.8 3.5 - 5.1 mmol/L    Chloride 106 97 - 108 mmol/L    CO2 29 21 - 32 mmol/L    Anion gap 7 5 - 15 mmol/L    Glucose 88 65 - 100 mg/dL    BUN 13 6 - 20 MG/DL    Creatinine 0.97 0.70 - 1.30 MG/DL    BUN/Creatinine ratio 13 12 - 20      GFR est AA >60 >60 ml/min/1.73m2    GFR est non-AA >60 >60 ml/min/1.73m2    Calcium 7.9 (L) 8.5 - 10.1 MG/DL    Bilirubin, total 0.7 0.2 - 1.0 MG/DL    ALT (SGPT) 29 12 - 78 U/L    AST (SGOT) 25 15 - 37 U/L    Alk.  phosphatase 135 (H) 45 - 117 U/L    Protein, total 6.8 6.4 - 8.2 g/dL    Albumin 3.4 (L) 3.5 - 5.0 g/dL    Globulin 3.4 2.0 - 4.0 g/dL    A-G Ratio 1.0 (L) 1.1 - 2.2     CK W/ CKMB & INDEX    Collection Time: 03/08/17 10:48 AM   Result Value Ref Range     39 - 308 U/L    CK - MB 1.9 <3.6 NG/ML    CK-MB Index 1.0 0 - 2.5     TROPONIN I    Collection Time: 03/08/17 10:48 AM   Result Value Ref Range    Troponin-I, Qt. <0.04 <0.05 ng/mL   LIPASE    Collection Time: 03/08/17 10:48 AM   Result Value Ref Range    Lipase 114 73 - 393 U/L   D DIMER    Collection Time: 03/08/17 10:48 AM   Result Value Ref Range    D-dimer 2.23 (H) 0.00 - 0.65 mg/L FEU   MAGNESIUM    Collection Time: 03/08/17 10:48 AM   Result Value Ref Range    Magnesium 2.0 1.6 - 2.4 mg/dL   CK W/ CKMB & INDEX    Collection Time: 03/08/17  1:05 PM   Result Value Ref Range     39 - 308 U/L    CK - MB 1.7 <3.6 NG/ML    CK-MB Index 1.1 0 - 2.5     TROPONIN I    Collection Time: 03/08/17  1:05 PM   Result Value Ref Range    Troponin-I, Qt. <0.04 <0.05 ng/mL       Xr Chest Pa Lat    Result Date: 3/8/2017  EXAM:  XR CHEST PA LAT. INDICATION: Chest pain. COMPARISON: 1/31/2017. FINDINGS: PA and lateral radiographs of the chest were obtained. Lungs: The lungs are clear of mass, nodule, airspace disease or edema. Pleura: There is no pleural effusion or pneumothorax. Mediastinum: The cardiac and mediastinal contours and pulmonary vascularity are normal.  The aorta is atherosclerotic and mildly tortuous but unchanged. Bones and soft tissues: The bones and soft tissues are within normal limits. IMPRESSION: No acute abnormality and no change. Cta Chest W Wo Cont    Result Date: 3/8/2017  EXAM: CT ANGIOGRAPHY CHEST INDICATION: Chest pain with elevated d-dimer. COMPARISON: None. CONTRAST: 86 mL of Isovue-370. TECHNIQUE: Precontrast  images were obtained to localize the volume for acquisition. Multislice helical CT arteriography was performed from the diaphragm to the thoracic inlet during uneventful rapid bolus intravenous contrast administration. Lung and soft tissue windows were generated. Coronal and sagittal  reformatted images were also generated and 3D post processing consisting of coronal maximum intensity projection images was performed. CT dose reduction was achieved through use of a standardized protocol tailored for this examination and automatic exposure control for dose modulation. FINDINGS: LOWER NECK: The visualized portions of the thyroid and structures of the lower neck are within normal limits.  LUNGS: The lungs are clear of mass, nodule, airspace disease or edema. PLEURA: There is no pleural effusion or pneumothorax. PULMONARY ARTERIES: The pulmonary arteries are well enhanced and no pulmonary emboli are identified. AORTA: The aorta enhances normally without evidence of aneurysm or dissection. MEDIASTINUM: There is no mediastinal or hilar adenopathy or mass. BONES AND SOFT TISSUES: The bones and soft tissues of the chest wall are within normal limits. UPPER ABDOMEN: The visualized portions of the upper abdominal organs are normal.     IMPRESSION: Normal CT arteriogram of the chest. No pulmonary embolus.

## 2017-03-10 ENCOUNTER — PATIENT OUTREACH (OUTPATIENT)
Dept: INTERNAL MEDICINE CLINIC | Age: 72
End: 2017-03-10

## 2017-03-23 ENCOUNTER — OFFICE VISIT (OUTPATIENT)
Dept: INTERNAL MEDICINE CLINIC | Age: 72
End: 2017-03-23

## 2017-03-23 VITALS
HEART RATE: 61 BPM | RESPIRATION RATE: 12 BRPM | BODY MASS INDEX: 31.77 KG/M2 | TEMPERATURE: 97.6 F | WEIGHT: 209.6 LBS | OXYGEN SATURATION: 100 % | SYSTOLIC BLOOD PRESSURE: 146 MMHG | HEIGHT: 68 IN | DIASTOLIC BLOOD PRESSURE: 82 MMHG

## 2017-03-23 DIAGNOSIS — R07.89 CHEST WALL PAIN: Primary | ICD-10-CM

## 2017-03-23 DIAGNOSIS — I10 ESSENTIAL HYPERTENSION: ICD-10-CM

## 2017-03-23 DIAGNOSIS — S20.212A RIB CONTUSION, LEFT, INITIAL ENCOUNTER: ICD-10-CM

## 2017-03-23 RX ORDER — GLUCOSAMINE SULFATE 1500 MG
2000 POWDER IN PACKET (EA) ORAL DAILY
Qty: 60 CAP | Refills: 5 | Status: ON HOLD
Start: 2017-03-23 | End: 2019-10-15

## 2017-03-23 RX ORDER — GLUCOSAMINE SULFATE 1500 MG
POWDER IN PACKET (EA) ORAL DAILY
COMMUNITY
End: 2017-03-23 | Stop reason: SDUPTHER

## 2017-03-23 NOTE — PROGRESS NOTES
Patient presents for an ER follow up for chest pain. States told may be pleurisy. He fell the other day in the garage. Monday coughed real hard and felt like got stabbed in the chest.  Still with pain.

## 2017-03-23 NOTE — PROGRESS NOTES
HISTORY OF PRESENT ILLNESS    Chief Complaint   Patient presents with    ED Follow-up       Presents for follow-up of ER visit 3/8/17. Reported left sternal chest pain which was was intermittent for 2 weeks and worse with inspiration. In ER, CTA neg for PE and stress test negative. Was dx with pleurisy. Went home with no pains and took aleve. 10 days ago, feel in the garage onto left chest wall. Tripped over a saw. No syncope. Coughed 4 dayd ago it is hurt the left chest all at site that he fell. Denies SOB now. No radiation to arms, no n/v. Does report mild cough. On 3/5, saw sleep clinic and Expiratory Pressure Relief (EPR) of level 3 was added for comfort to keep his mouth closed. .    Blood pressure 146/82, pulse 61, temperature 97.6 °F (36.4 °C), temperature source Oral, resp. rate 12, height 5' 8\" (1.727 m), weight 209 lb 9.6 oz (95.1 kg), SpO2 100 %. Hypertension  Hypertension ROS: taking medications as instructed, no medication side effects noted, no TIA's, no chest pain on exertion, no dyspnea on exertion, no swelling of ankles     reports that he quit smoking about 39 years ago. His smoking use included Cigarettes. He has a 10.00 pack-year smoking history. He has never used smokeless tobacco.    reports that he drinks about 2.5 oz of alcohol per week    BP Readings from Last 2 Encounters:   03/23/17 146/82   03/08/17 (!) 152/96       Review of Systems   All other systems reviewed and are negative, except as noted in HPI    Past Medical and Surgical History   has a past medical history of Abnormal celiac antibody panel (01/2017); Ankle fracture, left (6/29/15); AR (allergic rhinitis); Arthritis; Elevated prostate specific antigen (PSA); Esophageal motility disorder (01/2017); GERD (gastroesophageal reflux disease); Hearing loss; HTN (hypertension); Left leg paresthesias; Normal cardiac stress test (03/07/2017); Onychomycosis; CLARICE (obstructive sleep apnea) (2003);  Panic attacks (2002); Right inguinal hernia; Sinus bradycardia; Squamous cell carcinoma ([facility] [address] (1989); and UTI (urinary tract infection) (4/008). He also has no past medical history of Nausea & vomiting. has a past surgical history that includes colonoscopy (2002); ostate biopsies (2006, 2008); colonoscopy (10/3/14); tonsillectomy; urological (2005 2008); hernia repair (Bilateral, 12/19/2014); ankle fracture tx (Left, 6/29/15); and other surgical (). reports that he quit smoking about 39 years ago. His smoking use included Cigarettes. He has a 10.00 pack-year smoking history. He has never used smokeless tobacco. He reports that he drinks about 2.5 oz of alcohol per week  He reports that he does not use illicit drugs. family history includes Cancer in his mother; Celiac Disease in his daughter and mother; Diabetes in his father; Heart Disease in his father and maternal grandmother; Hypertension in his father. Physical Exam   Nursing note and vitals reviewed. Blood pressure 146/82, pulse 61, temperature 97.6 °F (36.4 °C), temperature source Oral, resp. rate 12, height 5' 8\" (1.727 m), weight 209 lb 9.6 oz (95.1 kg), SpO2 100 %. Constitutional:  No distress. Eyes: Conjunctivae are normal.   Ears:  Hearing grossly intact  Cardiovascular: Normal rate. regular rhythm, no murmurs or gallops  No edema  Pulmonary/Chest: Effort normal.   CTAB  Musculoskeletal: moves all 4 extremities   Neurological: Alert and oriented to person, place, and time. Skin: No rash noted. Psychiatric: Normal mood and affect. Behavior is normal.     ASSESSMENT and PLAN  Solitario Harrington was seen today for ed follow-up. Diagnoses and all orders for this visit:    Chest wall pain - the pain that brought him to ER may have been from pleurisy and is resolved. Rib contusion, left, initial encounter - unlikely rib fracture    Normal cardiac stress test    Essential hypertension - Controlled on current regimen.   Continue current medications as written in chart.    lab results and schedule of future lab studies reviewed with patient  reviewed medications and side effects in detail    Return to clinic for further evaluation if new symptoms develop    Follow-up Disposition: Not on File    Current Outpatient Prescriptions   Medication Sig    cholecalciferol (VITAMIN D3) 1,000 unit cap Take 2 Caps by mouth daily.  irbesartan (AVAPRO) 300 mg tablet Take 1 Tab by mouth nightly. Indications: hypertension    raNITIdine (ZANTAC) 300 mg tablet take 1 tablet by mouth once daily for HEARTBURN    cpap machine kit by Does Not Apply route. No current facility-administered medications for this visit.

## 2017-04-07 ENCOUNTER — DOCUMENTATION ONLY (OUTPATIENT)
Dept: SLEEP MEDICINE | Age: 72
End: 2017-04-07

## 2017-05-07 DIAGNOSIS — R07.9 CHEST PAIN, UNSPECIFIED TYPE: ICD-10-CM

## 2017-05-07 DIAGNOSIS — K21.9 GASTROESOPHAGEAL REFLUX DISEASE WITHOUT ESOPHAGITIS: ICD-10-CM

## 2017-05-08 RX ORDER — RANITIDINE 300 MG/1
300 TABLET ORAL DAILY
Qty: 30 TAB | Refills: 2 | Status: SHIPPED | OUTPATIENT
Start: 2017-05-08 | End: 2018-06-21 | Stop reason: ALTCHOICE

## 2017-05-08 NOTE — TELEPHONE ENCOUNTER
From: Samuel Pineda  To: Hi Saleem MD  Sent: 5/7/2017 7:31 AM EDT  Subject: Medication Renewal Request    Original authorizing provider: MD Samuel Villegas would like a refill of the following medications:  raNITIdine (ZANTAC) 300 mg tablet Hi Saleem MD]    Preferred pharmacy: [facility] [place] [address]    Comment:  I need to have this prescription refilled since I have no refills left.  Thanks Maria E Aguirre

## 2017-05-12 NOTE — PERIOP NOTES
[address]  Endoscopy Preprocedure Instructions      1. On the day of your surgery, please report to registration located on the 2nd floor of the  [facility]. yes    2. You must have a responsible adult to drive you to the hospital, stay at the hospital during your procedure and drive you home. If they leave your procedure will not be started (no exceptions). yes    3. Do not have anything to eat or drink (including water, gum, mints, coffee, and juice) after midnight. This does not apply to the medications you were instructed to take by your physician. yesIf you are currently taking Plavix, Coumadin, Aspirin, or other blood-thinning agents, contact your physician for special instructions. not applicable,    4. If you are having a procedure that requires bowel prep: We recommend that you have only clear liquids the day before your procedure and begin your bowel prep by 5:00 pm.  You may continue to drink clear liquids until midnight. If for any reason you are not able to complete your prep please notify your physician immediately. Not applicable    5. Have a list of all current medications, including vitamins, herbal supplements and any other over the counter medications. yes    6. If you wear glasses, contacts, dentures and/or hearing aids, they may be removed prior to procedure, please bring a case to store them in. yes    7. You should understand that if you do not follow these instructions your procedure may be cancelled. If your physical condition changes (I.e. fever, cold or flu) please contact your doctor as soon as possible. 8. It is important that you be on time.   If for any reason you are unable to keep your appointment please call )- the day of or your physicians office prior to your scheduled procedure

## 2017-05-15 ENCOUNTER — ANESTHESIA EVENT (OUTPATIENT)
Dept: ENDOSCOPY | Age: 72
End: 2017-05-15
Payer: MEDICARE

## 2017-05-15 ENCOUNTER — ANESTHESIA (OUTPATIENT)
Dept: ENDOSCOPY | Age: 72
End: 2017-05-15
Payer: MEDICARE

## 2017-05-15 ENCOUNTER — HOSPITAL ENCOUNTER (OUTPATIENT)
Age: 72
Setting detail: OUTPATIENT SURGERY
Discharge: HOME | End: 2017-05-15
Attending: INTERNAL MEDICINE | Admitting: INTERNAL MEDICINE
Payer: MEDICARE

## 2017-05-15 VITALS
DIASTOLIC BLOOD PRESSURE: 73 MMHG | OXYGEN SATURATION: 98 % | TEMPERATURE: 97.7 F | HEIGHT: 68 IN | SYSTOLIC BLOOD PRESSURE: 141 MMHG | BODY MASS INDEX: 31.07 KG/M2 | RESPIRATION RATE: 20 BRPM | HEART RATE: 56 BPM | WEIGHT: 205 LBS

## 2017-05-15 LAB
H PYLORI FROM TISSUE: NEGATIVE
KIT LOT NO., HCLOLOT: NORMAL
NEGATIVE CONTROL: NEGATIVE
POSITIVE CONTROL: POSITIVE

## 2017-05-15 PROCEDURE — 76060000031 HC ANESTHESIA FIRST 0.5 HR: Performed by: INTERNAL MEDICINE

## 2017-05-15 PROCEDURE — 88305 TISSUE EXAM BY PATHOLOGIST: CPT | Performed by: INTERNAL MEDICINE

## 2017-05-15 PROCEDURE — 76040000019: Performed by: INTERNAL MEDICINE

## 2017-05-15 PROCEDURE — 74011250636 HC RX REV CODE- 250/636

## 2017-05-15 PROCEDURE — 87077 CULTURE AEROBIC IDENTIFY: CPT | Performed by: INTERNAL MEDICINE

## 2017-05-15 PROCEDURE — 77030009426 HC FCPS BIOP ENDOSC BSC -B: Performed by: INTERNAL MEDICINE

## 2017-05-15 RX ORDER — EPINEPHRINE 0.1 MG/ML
1 INJECTION INTRACARDIAC; INTRAVENOUS
Status: CANCELLED | OUTPATIENT
Start: 2017-05-15 | End: 2017-05-15

## 2017-05-15 RX ORDER — SODIUM CHLORIDE 9 MG/ML
INJECTION, SOLUTION INTRAVENOUS
Status: DISCONTINUED | OUTPATIENT
Start: 2017-05-15 | End: 2017-05-15 | Stop reason: HOSPADM

## 2017-05-15 RX ORDER — NALOXONE HYDROCHLORIDE 0.4 MG/ML
0.4 INJECTION, SOLUTION INTRAMUSCULAR; INTRAVENOUS; SUBCUTANEOUS
Status: CANCELLED | OUTPATIENT
Start: 2017-05-15 | End: 2017-05-15

## 2017-05-15 RX ORDER — MIDAZOLAM HYDROCHLORIDE 1 MG/ML
.25-5 INJECTION, SOLUTION INTRAMUSCULAR; INTRAVENOUS
Status: CANCELLED | OUTPATIENT
Start: 2017-05-15

## 2017-05-15 RX ORDER — DEXTROMETHORPHAN/PSEUDOEPHED 2.5-7.5/.8
1.2 DROPS ORAL
Status: CANCELLED | OUTPATIENT
Start: 2017-05-15

## 2017-05-15 RX ORDER — PROPOFOL 10 MG/ML
INJECTION, EMULSION INTRAVENOUS AS NEEDED
Status: DISCONTINUED | OUTPATIENT
Start: 2017-05-15 | End: 2017-05-15 | Stop reason: HOSPADM

## 2017-05-15 RX ORDER — SODIUM CHLORIDE 9 MG/ML
50 INJECTION, SOLUTION INTRAVENOUS CONTINUOUS
Status: CANCELLED | OUTPATIENT
Start: 2017-05-15 | End: 2017-05-15

## 2017-05-15 RX ORDER — ATROPINE SULFATE 0.1 MG/ML
0.4 INJECTION INTRAVENOUS
Status: CANCELLED | OUTPATIENT
Start: 2017-05-15 | End: 2017-05-15

## 2017-05-15 RX ORDER — FLUMAZENIL 0.1 MG/ML
0.2 INJECTION INTRAVENOUS
Status: CANCELLED | OUTPATIENT
Start: 2017-05-15 | End: 2017-05-15

## 2017-05-15 RX ADMIN — PROPOFOL 30 MG: 10 INJECTION, EMULSION INTRAVENOUS at 15:20

## 2017-05-15 RX ADMIN — SODIUM CHLORIDE: 9 INJECTION, SOLUTION INTRAVENOUS at 15:00

## 2017-05-15 RX ADMIN — PROPOFOL 30 MG: 10 INJECTION, EMULSION INTRAVENOUS at 15:17

## 2017-05-15 RX ADMIN — PROPOFOL 120 MG: 10 INJECTION, EMULSION INTRAVENOUS at 15:13

## 2017-05-15 RX ADMIN — PROPOFOL 20 MG: 10 INJECTION, EMULSION INTRAVENOUS at 15:24

## 2017-05-15 NOTE — DISCHARGE INSTRUCTIONS
Carlyn Alfaro M.D.  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 Biopsies were obtained. EGD DISCHARGE INSTRUCTIONS    DISCOMFORT:  Sore throat- throat lozenges or warm salt water gargle  redness at IV site- apply warm compress to area; if redness or soreness persist- contact your physician  Gaseous discomfort- walking, belching will help relieve any discomfort  You may not operate a vehicle for 12 hours  You may not engage in an occupation involving machinery or appliances for rest of today  You may not drink alcoholic beverages for at least 12 hours  Avoid making any critical decisions for at least 24 hour    DIET:   You may resume your usual diet    ACTIVITY  Spend the remainder of the day resting -  avoid any strenuous activity. Avoid driving or operating machinery. CALL M.D. ANY SIGN OF   Increasing pain, nausea, vomiting  Abdominal distension (swelling)  New increased bleeding (oral or rectal)  Fever (chills)  Pain in chest area  Bloody discharge from nose or mouth  Shortness of breath    Follow-up Instructions:   Call Dr. Jozef Torres for any questions or problems. Telephone # [phone]  Biopsies were obtained, the results will be available  in  5 to 7 days. We will call you to notify you of these results. Start taking omeprazole which I will send to your pharmacy.

## 2017-05-15 NOTE — H&P
The patient is a 81year old male who presents with a complaint of dysphagia. The patient presents for consultation at the request of Dr. Jaswinder Newby. The dysphagia has been occurring in an intermittent pattern for 1 year. The course has been recurrent and  is described as mild .  The dysphagia is described as being located in the substernal  area. The symptoms are aggravated by solids only. The symptoms have been associated with heartburn and odynophagia (sometimes), while the symptoms have not been associated with weight loss. Note for \"Dysphagia\": His daughter and his mother were diagnosed with celiac disease. He reports at times he has a slow metabolism and has a hard time digesting food. He reports he had a barium swallow that showed a small to moderate size hiatal hernia. He had labs and showed elevated tTG IgA. Problem List/Past Medical (Annie Rogers; 5/8/2017 9:04 AM)  Tinnitus   Arthritis   Hypertension     Past Surgical History (Annie Rogers; 5/8/2017 9:04 AM)  Tonsillectomy   Broken leg (827.0  S82.90XA)  Left. Inguinal Hernia Repair     Allergies (Annie Rogers; 5/8/2017 9:04 AM)  No Known Allergies 04/24/2017  No Known Drug Allergies 04/24/2017    Medication History (Sofiya Matias; 5/8/2017 9:05 AM)  Vitamin D3  (1000UNIT Tablet, Oral daily) Active. RaNITidine HCl  (300MG Capsule, Oral daily) Active. Irbesartan  (300MG Tablet, Oral daily) Active. Medications Reconciled     Family History (Annie Rogers; 5/8/2017 9:04 AM)  [place] Mother. Hypertension  Father, Sister. Diabetes Mellitus  Father. Social History (Annie Rogers; 5/8/2017 9:04 AM)  Blood Transfusion  No.  Alcohol Use  Occasional alcohol use, Drinks beer, Drinks hard liquor, Drinks wine. 1 to 2 per week  Employment status  Retired. Marital status  . Tobacco Use  Former smoker.     Diagnostic Studies History (Annie Rogers; 5/8/2017 9:04 AM)  Colonoscopy     Health Maintenance History (Annie Rogers; 5/8/2017 9:04 AM)  Flu Vaccine  none  Pneumovax 10/2016    Review of Systems (Alex Kunz; 5/8/2017 9:04 AM)  General Present- Fatigue. Not Present- Chronic Fatigue, Poor Appetite, Weight Gain and Weight Loss. Skin Not Present- Itching, Rash and Skin Color Changes. HEENT Present- Tinnitus. Not Present- Hearing Loss and Vertigo. Respiratory Not Present- Difficulty Breathing and TB exposure. Cardiovascular Present- Chest Pain. Not Present- Use of Antibiotics before Dental Procedures and Use of Blood Thinners. Gastrointestinal Present- See HPI. Musculoskeletal Present- Arthritis. Not Present- Hip Replacement Surgery and Knee Replacement Surgery. Neurological Not Present- Weakness. Psychiatric Not Present- Depression. Endocrine Not Present- Diabetes and Thyroid Problems. Hematology Not Present- Anemia. Vitals (Alex Kunz; 5/8/2017 9:05 AM)  5/8/2017 9:04 AM  Weight: 210 lb   Height: 68 in    Body Surface Area: 2.14 m²   Body Mass Index: 31.93 kg/m²  Temp.: 98.1° F (Temporal)    Pulse: 56 (Regular)    Resp.: 18 (Unlabored)     BP: 142/84 (Sitting, Left Arm, Standard)        Physical Exam (Saleem Jarquin MD; 5/8/2017 9:21 AM)  General  Mental Status - Alert. General Appearance - Cooperative, Pleasant, Not in acute distress. Orientation - Oriented X3. Build & Nutrition - Well nourished and Well developed. Integumentary  General Characteristics  Overall examination of the patient's skin reveals - no rashes, no bruises and no spider angiomas. Color - normal coloration of skin. Head and Neck  Neck  Global Assessment - full range of motion and supple, no bruit auscultated on the right, no bruit auscultated on the left, non-tender, no lymphadenopathy. Thyroid  Gland Characteristics - normal size and consistency. Eye  Eyeball - Left - No Exophthalmos. Eyeball - Right - No Exophthalmos. Sclera/Conjunctiva - Left - No Jaundice. Sclera/Conjunctiva - Right - No Jaundice.     Chest and Lung Exam  Chest and lung exam reveals  - quiet, even and easy respiratory effort with no use of accessory muscles. Auscultation  Breath sounds - Normal. Adventitious sounds - No Adventitious sounds. Cardiovascular  Auscultation  Rhythm - Regular, No Tachycardia, No Bradycardia . Heart Sounds - Normal heart sounds , S1 WNL and S2 WNL, No S3, No Summation Gallop. Murmurs & Other Heart Sounds - Auscultation of the heart reveals - No Murmurs. Abdomen  Inspection  Inspection of the abdomen reveals - Non-distended. Palpation/Percussion  Tenderness - Non-Tender. Rebound tenderness - No rebound. Liver - No hepatosplenomegaly. Abdominal Mass Palpable - No masses. Other Characteristics - No Ascites. Organomegally - None. Auscultation  Auscultation of the abdomen reveals - Bowel sounds normal, No Abdominal bruits and No Succussion splash. Rectal - Did not examine. Peripheral Vascular  Upper Extremity  Inspection - Left - Normal - No Clubbing, No Cyanosis, No Edema, Pulses Intact. Right - Normal - No Clubbing, No Cyanosis, No Edema, Pulses Intact. Palpation - Edema - Left - No edema. Right - No edema. Lower Extremity  Inspection - Left - Inspection Normal. Right - Inspection Normal. Palpation - Edema - Left - No edema. Right - No edema. Neurologic  Neurologic evaluation reveals  - Cranial nerves grossly intact, no focal neurologic deficits. Motor  Involuntary Movements - Asterixis - not present. Musculoskeletal  Global Assessment  Gait and Station - normal gait and station. Assessment & Plan (Saleem Young MD; 5/8/2017 9:25 AM)  Dysphagia (787.20  R13.10)  Impression: Instructed to eat slowly, chew food very well and have sips of water with meals. Will proceed with EGD and possible dilation, rule out schatzki's ring versus esophagitis. Continue [facility] ranitidine for now. Dietary and lifestyle recommendations were explained in detail and reinforced.   Current Plans    ENDOSCOPY, UPPER GI, DIAGNOSTIC (47442) (Discussed risks and benefits with the patient to include: perforation,or post biopsy bleeding, missed lesions, and sedation reactions.)  Adult celiac disease (579.0  K90.0)  Impression: Will obtain biopsies from the duodenum and then he was instructed to start a gluten free diet. Educational pamphlet given to him. Current Plans  Pt Education - How to access health information online: discussed with patient and provided information. Patient is to call me for any questions or concerns.   Follow up office visit in 3 months

## 2017-05-15 NOTE — PROGRESS NOTES
Gilbert Cruz  1945  [id number]    Situation:  Verbal report received from: Shai hWelan rn  Procedure: Procedure(s):  ESOPHAGOGASTRODUODENOSCOPY (EGD)  ESOPHAGOGASTRODUODENAL (EGD) BIOPSY    Background:    Preoperative diagnosis: DYSPHAGIA  Postoperative diagnosis: Hiatal Hernia  Barretts Esophagus  Duodenitis    :  Dr. Lara Toney  Assistant(s): Endoscopy Technician-1: Joseph Petit  Endoscopy RN-1: Zan Santillan RN    Specimens:   ID Type Source Tests Collected by Time Destination   1 : Duodenum Biopsies Preservative   Benji Barron MD 5/15/2017 1515 Pathology   2 : 35 cm Esophagus Bx Santaative   Benji Barron MD 5/15/2017 1520 Pathology   3 : 33cm Esophagus Biopsy Santaative   Benji Barron MD 5/15/2017 1521 Pathology   4 : 31 cm Esophagus Biopsy Stevie Barron MD 5/15/2017 1521 Pathology   5 : 29 cm Esophagus Biopsy Rosamaria Adler MD 5/15/2017 1521 Pathology   6 : 27 cm Esophagus Biopsy Rosamaria Adler MD 5/15/2017 1521 Pathology   7 : 25 cm Esophagus Biopsy Rosamaria Adler MD 5/15/2017 1521 Pathology     H. Pylori  yes    Assessment:  Intra-procedure medications   Anesthesia gave intra-procedure sedation and medications, see anesthesia flow sheet yes    Intravenous fluids: NS@ KVO     Vital signs stable     Abdominal assessment: round and soft     Recommendation:  Discharge patient per MD order. Family   Permission to share finding with family or friend yes  Endoscopy discharge instructions have been reviewed and given to patient. The patient verbalized understanding and acceptance of instructions.

## 2017-05-15 NOTE — IP AVS SNAPSHOT
[address] 
[phone] Patient: Reji Napier MRN: [id number] HRE:2/59/9625 You are allergic to the following No active allergies Recent Documentation Height Weight BMI Smoking Status 1.727 m 93 kg 31.17 kg/m2 Former Smoker Emergency Contacts Name Discharge Info Relation Home Work Mobile Laurita Nelson DISCHARGE CAREGIVER [3] Spouse [3] [phone] [facility] [place] DISCHARGE CAREGIVER [3] Sister [23] [phone] KARENA Shay  Spouse [3] [phone] About your hospitalization You were admitted on:  May 15, 2017 You last received care in the:  [facility] ENDOSCOPY You were discharged on:  May 15, 2017 Unit phone number:  [phone] Why you were hospitalized Your primary diagnosis was:  Not on File Providers Seen During Your Hospitalizations Provider Role Specialty Primary office phone Radha De La Vega MD Attending Provider Gastroenterology [phone] Your Primary Care Physician (PCP) Primary Care Physician Office Phone Office Fax Clive Trejo [phone] Follow-up Information None Current Discharge Medication List  
  
ASK your doctor about these medications Dose & Instructions Dispensing Information Comments Morning Noon Evening Bedtime  
 cholecalciferol 1,000 unit Cap Commonly known as:  VITAMIN D3 Your last dose was: Your next dose is:    
   
   
 Dose:  2000 Units Take 2 Caps by mouth daily. Quantity:  60 Cap Refills:  5  
     
   
   
   
  
 cpap machine kit Your last dose was: Your next dose is:    
   
   
 by Does Not Apply route. Refills:  0  
     
   
   
   
  
 irbesartan 300 mg tablet Commonly known as:  AVAPRO Your last dose was:     
   
Your next dose is:    
   
   
 Dose:  300 mg  
 Take 1 Tab by mouth nightly. Indications: hypertension Quantity:  30 Tab Refills:  2  
     
   
   
   
  
 raNITIdine 300 mg tablet Commonly known as:  ZANTAC Your last dose was: Your next dose is:    
   
   
 Dose:  300 mg Take 1 Tab by mouth daily. Quantity:  30 Tab Refills:  2 Discharge Instructions Maryjane Hernandez M.D. 
[phone] 5/15/2017 Cindy Murray :  1945 [address] [place] [address] Record Number:  [id number] ENDOSCOPY FINDINGS: 
 Your endoscopy showed mild duodenitis and evidence of Pool's esophagus. Biopsies were obtained. EGD DISCHARGE INSTRUCTIONS DISCOMFORT: 
Sore throat- throat lozenges or warm salt water gargle 
redness at IV site- apply warm compress to area; if redness or soreness persist- contact your physician Gaseous discomfort- walking, belching will help relieve any discomfort You may not operate a vehicle for 12 hours You may not engage in an occupation involving machinery or appliances for rest of today You may not drink alcoholic beverages for at least 12 hours Avoid making any critical decisions for at least 24 hour DIET: 
 You may resume your usual diet ACTIVITY Spend the remainder of the day resting -  avoid any strenuous activity. Avoid driving or operating machinery. CALL M.D. ANY SIGN OF Increasing pain, nausea, vomiting Abdominal distension (swelling) New increased bleeding (oral or rectal) Fever (chills) Pain in chest area Bloody discharge from nose or mouth Shortness of breath Follow-up Instructions: 
 Call Dr. Jana Macias for any questions or problems. Telephone # [phone] Biopsies were obtained, the results will be available  in  5 to 7 days. We will call you to notify you of these results. Start taking omeprazole which I will send to your pharmacy. Discharge Orders None ACO Transitions of Care Introducing [facility] offers a voluntary care coordination program to provide high quality service and care to [facility] fee-for-service beneficiaries. Lidia Reeves was designed to help you enhance your health and well-being through the following services: ? Transitions of Care  support for individuals who are transitioning from one care setting to another (example: Hospital to home). ? Chronic and Complex Care Coordination  support for individuals and caregivers of those with serious or chronic illnesses or with more than one chronic (ongoing) condition and those who take a number of different medications. If you meet specific medical criteria, a [address] [place] [address] Rd may call you directly to coordinate your care with your primary care physician and your other care providers. For questions about the [facility] programs, please, contact your physicians office. For general questions or additional information about Accountable Care Organizations: 
Please visit www.medicare.gov/acos. html or call 1-800-MEDICARE (9-620.561.1002) TTY users should call 7-927.781.2503. Introducing [facility]! Dear Rivera Rudolph: Thank you for requesting a [employer] account. Our records indicate that you already have an active [employer] account. You can access your account anytime at [email or web address] Did you know that you can access your hospital and ER discharge instructions at any time in [employer]? You can also review all of your test results from your hospital stay or ER visit. Additional Information If you have questions, please visit the Frequently Asked Questions section of the [employer] website at [email or web address] Remember, [employer] is NOT to be used for urgent needs. For medical emergencies, dial 911. Now available from your iPhone and Android! General Information Please provide this summary of care documentation to your next provider. Patient Signature:  ____________________________________________________________ Date:  ____________________________________________________________  
  
Marylou Canes Provider Signature:  ____________________________________________________________ Date:  ____________________________________________________________

## 2017-05-15 NOTE — PERIOP NOTES
Report from Yessenia Ledesma CRNA, see anesthesia record. ABD remains soft and non-tender post procedure. Pt has no complaints at this time and tolerated the procedure well.

## 2017-05-15 NOTE — ANESTHESIA PREPROCEDURE EVALUATION
Anesthetic History   No history of anesthetic complications            Review of Systems / Medical History  Patient summary reviewed    Pulmonary        Sleep apnea: CPAP           Neuro/Psych   Within defined limits           Cardiovascular    Hypertension              Exercise tolerance: >4 METS     GI/Hepatic/Renal     GERD           Endo/Other        Arthritis     Other Findings              Physical Exam    Airway  Mallampati: II  TM Distance: 4 - 6 cm  Neck ROM: normal range of motion   Mouth opening: Normal     Cardiovascular  Regular rate and rhythm,  S1 and S2 normal,  no murmur, click, rub, or gallop  Rhythm: regular  Rate: normal         Dental  No notable dental hx       Pulmonary  Breath sounds clear to auscultation               Abdominal  GI exam deferred       Other Findings            Anesthetic Plan    ASA: 2  Anesthesia type: MAC          Induction: Intravenous  Anesthetic plan and risks discussed with: Patient

## 2017-05-15 NOTE — PROCEDURES
Brenda Green M.D.  [phone]           5/15/2017                EGD Operative Report  Julian Lane  :  1945  [facility] Medical Record Number:  [id number]      Indication:  Dyphagia/odynophagia, GERD, elevated tTG with family history of celiac disease     : Lorna Quevedo MD    Referring Provider:  Katarzyna Jean Baptiste MD      Anesthesia/Sedation:  MAC anesthesia    Airway assessment: No airway problems anticipated    Pre-Procedural Exam:      Airway: clear, no airway problems anticipated  Heart: RRR, without gallops or rubs  Lungs: clear bilaterally without wheezes, crackles, or rhonchi  Abdomen: soft, nontender, nondistended, bowel sounds present  Mental Status: awake, alert and oriented to person, place and time       Procedure Details     After infomed consent was obtained for the procedure, with all risks and benefits of procedure explained the patient was taken to the endoscopy suite and placed in the left lateral decubitus position. Following sequential administration of sedation as per above, the endoscope was inserted into the mouth and advanced under direct vision to second portion of the duodenum. A careful inspection was made as the gastroscope was withdrawn, including a retroflexed view of the proximal stomach; findings and interventions are described below. Findings:   Esophagus:Evidence of a 10 cm long segment of salmon colored mucosa consistent with Pool's esophagus. No nodule or stricture or ulcer seen. Stomach: Small to moderate size hiatal hernia seen, otherwise gastric mucosa appeared within normal  Duodenum/jejunum: Mild duodenitis noted in the bulb, otherwise mucosa within normal in the second portion of the duodenum    Therapies:  none    Specimens: Pyloritek, duodenum and four quadrant biopsies obtained from the esophagus every 2 cm from 35 cm to 25 cm from the gums.            Complications:   None; patient tolerated the procedure well.    EBL:  None.            Impression:    Hiatal Hernia                           Pool's Esophagus                           Duodenitis      Recommendations:    -Acid suppression with a proton pump inhibitor.  -Await pathology.  -Await AVI test result and treat for Helicobacter pylori if positive.  -Start gluten free diet  -Follow-up in the office    Tristin Gonzalez MD

## 2017-05-15 NOTE — ANESTHESIA POSTPROCEDURE EVALUATION
Post-Anesthesia Evaluation and Assessment    Patient: Scott Quezada MRN: [id number]  SSN: [id number]    YOB: 1945  Age: 81 y.o. Sex: male       Cardiovascular Function/Vital Signs  Visit Vitals    /81    Pulse 62    Temp 36.4 °C (97.5 °F)    Resp 16    Ht 5' 8\" (1.727 m)    Wt 93 kg (205 lb)    SpO2 96%    BMI 31.17 kg/m2       Patient is status post MAC anesthesia for Procedure(s):  ESOPHAGOGASTRODUODENOSCOPY (EGD)  ESOPHAGOGASTRODUODENAL (EGD) BIOPSY. Nausea/Vomiting: None    Postoperative hydration reviewed and adequate. Pain:  Pain Scale 1: Numeric (0 - 10) (05/15/17 1259)  Pain Intensity 1: 0 (05/15/17 1259)   Managed    Neurological Status: At baseline    Mental Status and Level of Consciousness: Arousable    Pulmonary Status:   O2 Device: Room air (05/15/17 1259)   Adequate oxygenation and airway patent    Complications related to anesthesia: None    Post-anesthesia assessment completed.  No concerns    Signed By: Gladis Holden DO     May 15, 2017

## 2017-10-17 ENCOUNTER — TELEPHONE (OUTPATIENT)
Dept: INTERNAL MEDICINE CLINIC | Age: 72
End: 2017-10-17

## 2017-10-17 NOTE — TELEPHONE ENCOUNTER
Paged since he noticed dark red \"milky cranberry colored\" urine after working in the yard. No clots, dysuria, flank pain. Not on asa or blood thinners. Push fluids. To ER if unable to void, severe pain, temp > 101. I asked him to come in at 8 am tomorrow AM for evaluation, UA and possible CT.

## 2017-10-18 ENCOUNTER — HOSPITAL ENCOUNTER (OUTPATIENT)
Dept: LAB | Age: 72
Discharge: HOME | End: 2017-10-18
Payer: MEDICARE

## 2017-10-18 ENCOUNTER — OFFICE VISIT (OUTPATIENT)
Dept: INTERNAL MEDICINE CLINIC | Age: 72
End: 2017-10-18

## 2017-10-18 VITALS
DIASTOLIC BLOOD PRESSURE: 87 MMHG | BODY MASS INDEX: 31.83 KG/M2 | HEART RATE: 56 BPM | RESPIRATION RATE: 12 BRPM | SYSTOLIC BLOOD PRESSURE: 153 MMHG | WEIGHT: 210 LBS | HEIGHT: 68 IN | TEMPERATURE: 97.7 F

## 2017-10-18 DIAGNOSIS — R31.0 GROSS HEMATURIA: Primary | ICD-10-CM

## 2017-10-18 DIAGNOSIS — R97.20 ELEVATED PROSTATE SPECIFIC ANTIGEN (PSA): ICD-10-CM

## 2017-10-18 DIAGNOSIS — I10 ESSENTIAL HYPERTENSION: ICD-10-CM

## 2017-10-18 DIAGNOSIS — E55.9 VITAMIN D DEFICIENCY: ICD-10-CM

## 2017-10-18 LAB
BILIRUB UR QL STRIP: NEGATIVE
GLUCOSE UR-MCNC: NEGATIVE MG/DL
KETONES P FAST UR STRIP-MCNC: NEGATIVE MG/DL
PH UR STRIP: 6.5 [PH] (ref 4.6–8)
PROT UR QL STRIP: NEGATIVE MG/DL
SP GR UR STRIP: 1.01 (ref 1–1.03)
UA UROBILINOGEN AMB POC: NORMAL (ref 0.2–1)
URINALYSIS CLARITY POC: CLEAR
URINALYSIS COLOR POC: YELLOW
URINE BLOOD POC: NORMAL
URINE LEUKOCYTES POC: NEGATIVE
URINE NITRITES POC: NEGATIVE

## 2017-10-18 PROCEDURE — 88160 CYTOPATH SMEAR OTHER SOURCE: CPT

## 2017-10-18 PROCEDURE — 80061 LIPID PANEL: CPT

## 2017-10-18 PROCEDURE — 81001 URINALYSIS AUTO W/SCOPE: CPT

## 2017-10-18 PROCEDURE — 84154 ASSAY OF PSA FREE: CPT

## 2017-10-18 PROCEDURE — 87086 URINE CULTURE/COLONY COUNT: CPT

## 2017-10-18 PROCEDURE — 82550 ASSAY OF CK (CPK): CPT

## 2017-10-18 PROCEDURE — 80053 COMPREHEN METABOLIC PANEL: CPT

## 2017-10-18 PROCEDURE — 82306 VITAMIN D 25 HYDROXY: CPT

## 2017-10-18 PROCEDURE — 84153 ASSAY OF PSA TOTAL: CPT

## 2017-10-18 PROCEDURE — 36415 COLL VENOUS BLD VENIPUNCTURE: CPT

## 2017-10-18 RX ORDER — OMEPRAZOLE 20 MG/1
40 CAPSULE, DELAYED RELEASE ORAL 2 TIMES DAILY
Refills: 0 | COMMUNITY
Start: 2017-10-10 | End: 2020-08-10 | Stop reason: ALTCHOICE

## 2017-10-18 NOTE — PROGRESS NOTES
HISTORY OF PRESENT ILLNESS    Presents with red blood in urine for 1 day(s). Was working in the yard and came inside and noticed blood in the toilet. Associated symptoms include: no flank pain, dysuria, fever. He passed small clots this morning. He is able to urinate normally. The severity of bleeding seems to have improved some today. No prior history of hematuria. No prior history of nephrolithiasis. He does have elevated PSA and saw Dr. Parul García. He does have a history of prostate biopsy in the past.  Lab Results   Component Value Date/Time    Prostate Specific Ag 6.1 09/27/2016 10:41 AM    Prostate Specific Ag 4.1 09/02/2014 12:21 PM    Prostate Specific Ag 7.5 01/23/2014 09:54 AM    % Free PSA 24.9 09/27/2016 10:41 AM    % Free PSA 25.9 09/02/2014 12:21 PM    % Free PSA 37.2 01/23/2014 09:54 AM       Hypertension  Hypertension ROS: taking medications as instructed, no medication side effects noted, no TIA's, no chest pain on exertion, no dyspnea on exertion, no swelling of ankles     reports that he quit smoking about 39 years ago. His smoking use included Cigarettes. He has a 10.00 pack-year smoking history. He has never used smokeless tobacco.    reports that he drinks about 2.5 oz of alcohol per week    BP Readings from Last 2 Encounters:   10/18/17 153/87   05/15/17 141/73         Review of Systems   All other systems reviewed and are negative, except as noted in HPI    Past Medical and Surgical History   has a past medical history of Abnormal celiac antibody panel (01/2017); Ankle fracture, left (6/29/15); AR (allergic rhinitis); Arthritis; Elevated prostate specific antigen (PSA); Esophageal motility disorder (01/2017); GERD (gastroesophageal reflux disease); Hearing loss; HTN (hypertension); Left leg paresthesias; Normal cardiac stress test (03/07/2017); Onychomycosis; CLARICE (obstructive sleep apnea) (2003); Panic attacks (2002); Pleurisy; Right inguinal hernia;  Sinus bradycardia; Squamous cell carcinoma (1989); and UTI (urinary tract infection) (04/2008). He also has no past medical history of Nausea & vomiting. has a past surgical history that includes colonoscopy (2002); ostate biopsies (2006, 2008); colonoscopy (10/3/14); tonsillectomy; urological (2005 2008); hernia repair (Bilateral, 12/19/2014); ankle fracture tx (Left, 6/29/15); and other surgical (). reports that he quit smoking about 39 years ago. His smoking use included Cigarettes. He has a 10.00 pack-year smoking history. He has never used smokeless tobacco. He reports that he drinks about 2.5 oz of alcohol per week  He reports that he does not use illicit drugs. family history includes Cancer in his mother; Celiac Disease in his daughter and mother; Diabetes in his father; Heart Disease in his father and maternal grandmother; Hypertension in his father. Physical Exam   Nursing note and vitals reviewed. Blood pressure 153/87, pulse (!) 56, temperature 97.7 °F (36.5 °C), temperature source Oral, resp. rate 12, height 5' 8\" (1.727 m), weight 210 lb (95.3 kg). Constitutional: In no distress. Eyes: Conjunctivae are normal.  HEENT:  No LAD or thyromegaly   Cardiovascular: Normal rate. regular rhythm. No murmurs  No edema  Pulmonary/Chest: Effort normal. clear to ausculation blaterally  Musculoskeletal:  no edema. Abd: Soft, nontender, no CVA tenderness. Neurological: Alert and oriented. Grossly intact cranial nerves and motor function. Skin: No rash noted. Psychiatric: Normal mood and affect. Behavior is normal.     ASSESSMENT and PLAN  Diagnoses and all orders for this visit:    1. Gross hematuria -sent for cytology and urinalysis. Less likely infection. Check renal colic CT.   Then referred to urology for possible cystoscopy if normal.  To emergency room if unable to void, severe flank pain, temperature above 101  -     CULTURE, URINE  -     URINALYSIS W/ RFLX MICROSCOPIC  -     URINE CYTOLOGY  -     CT ABD PELV WO CONT; Future  -     REFERRAL TO UROLOGY    2. Essential hypertension - borderline controlled but he is under some stress regarding the above. Will monitor for now. -   -     LIPID PANEL  -     METABOLIC PANEL, COMPREHENSIVE  -     CK    3. Elevated prostate specific antigen (PSA)  -     PSA W/ REFLX FREE PSA    4. Vitamin D deficiency  -     VITAMIN D, 25 HYDROXY    lab results and schedule of future lab studies reviewed with patient  reviewed medications and side effects in detail  Return to clinic for further evaluation if new symptoms develop or if current symptoms worsen or fail to resolve.

## 2017-10-19 LAB
25(OH)D3+25(OH)D2 SERPL-MCNC: 30.8 NG/ML (ref 30–100)
ALBUMIN SERPL-MCNC: 3.9 G/DL (ref 3.5–4.8)
ALBUMIN/GLOB SERPL: 1.6 {RATIO} (ref 1.2–2.2)
ALP SERPL-CCNC: 124 IU/L (ref 39–117)
ALT SERPL-CCNC: 20 IU/L (ref 0–44)
APPEARANCE UR: CLEAR
AST SERPL-CCNC: 23 IU/L (ref 0–40)
BACTERIA #/AREA URNS HPF: ABNORMAL /[HPF]
BACTERIA UR CULT: NORMAL
BILIRUB SERPL-MCNC: 0.6 MG/DL (ref 0–1.2)
BILIRUB UR QL STRIP: NEGATIVE
BUN SERPL-MCNC: 16 MG/DL (ref 8–27)
BUN/CREAT SERPL: 19 (ref 10–24)
CALCIUM SERPL-MCNC: 9.1 MG/DL (ref 8.6–10.2)
CASTS URNS QL MICRO: ABNORMAL /LPF
CHLORIDE SERPL-SCNC: 102 MMOL/L (ref 96–106)
CHOLEST SERPL-MCNC: 141 MG/DL (ref 100–199)
CK SERPL-CCNC: 166 U/L (ref 24–204)
CO2 SERPL-SCNC: 26 MMOL/L (ref 18–29)
COLOR UR: YELLOW
CREAT SERPL-MCNC: 0.86 MG/DL (ref 0.76–1.27)
DX ICD CODE: NORMAL
EPI CELLS #/AREA URNS HPF: ABNORMAL /HPF
GFR SERPLBLD CREATININE-BSD FMLA CKD-EPI: 102 ML/MIN/1.73
GFR SERPLBLD CREATININE-BSD FMLA CKD-EPI: 88 ML/MIN/1.73
GLOBULIN SER CALC-MCNC: 2.4 G/DL (ref 1.5–4.5)
GLUCOSE SERPL-MCNC: 86 MG/DL (ref 65–99)
GLUCOSE UR QL: NEGATIVE
HDLC SERPL-MCNC: 47 MG/DL
HGB UR QL STRIP: ABNORMAL
INTERPRETATION, 910389: NORMAL
KETONES UR QL STRIP: NEGATIVE
LDLC SERPL CALC-MCNC: 85 MG/DL (ref 0–99)
LEUKOCYTE ESTERASE UR QL STRIP: NEGATIVE
MICRO URNS: ABNORMAL
NITRITE UR QL STRIP: NEGATIVE
PATH REPORT.FINAL DX SPEC: NORMAL
PATH REPORT.GROSS SPEC: NORMAL
PATH REPORT.SITE OF ORIGIN SPEC: NORMAL
PATHOLOGIST NAME: NORMAL
PERFORMED BY, 191120: NORMAL
PH UR STRIP: 6.5 [PH] (ref 5–7.5)
POTASSIUM SERPL-SCNC: 4.6 MMOL/L (ref 3.5–5.2)
PROT SERPL-MCNC: 6.3 G/DL (ref 6–8.5)
PROT UR QL STRIP: NEGATIVE
PSA FREE MFR SERPL: 29 %
PSA FREE SERPL-MCNC: 1.19 NG/ML
PSA SERPL-MCNC: 4.1 NG/ML (ref 0–4)
RBC #/AREA URNS HPF: ABNORMAL /HPF
REFLEX CRITERIA: ABNORMAL
SODIUM SERPL-SCNC: 140 MMOL/L (ref 134–144)
SP GR UR: 1.01 (ref 1–1.03)
TRIGL SERPL-MCNC: 45 MG/DL (ref 0–149)
UROBILINOGEN UR STRIP-MCNC: 0.2 MG/DL (ref 0.2–1)
VLDLC SERPL CALC-MCNC: 9 MG/DL (ref 5–40)
WBC #/AREA URNS HPF: ABNORMAL /HPF

## 2017-10-24 ENCOUNTER — HOSPITAL ENCOUNTER (OUTPATIENT)
Dept: CT IMAGING | Age: 72
Discharge: HOME | End: 2017-10-24
Attending: INTERNAL MEDICINE
Payer: MEDICARE

## 2017-10-24 DIAGNOSIS — R31.0 GROSS HEMATURIA: ICD-10-CM

## 2017-10-24 PROCEDURE — 74176 CT ABD & PELVIS W/O CONTRAST: CPT

## 2017-10-25 ENCOUNTER — TELEPHONE (OUTPATIENT)
Dept: INTERNAL MEDICINE CLINIC | Age: 72
End: 2017-10-25

## 2017-10-25 NOTE — TELEPHONE ENCOUNTER
CT shows a stone in the bladder which I suspect is causing the bleeding. It also shows a kidney small growth which may be a cyst.  I recommend that he consult urology, as referred.   They can do an ultrasound in the office to look at the kidney and can consider a cystoscopy to evaluate the bladder

## 2017-11-01 NOTE — TELEPHONE ENCOUNTER
Pt has appointment with Dr Kayli Obando on 11/29, is it ok to wait that long per pt, no problems at this time.  Re: (CT scan results)

## 2017-12-04 RX ORDER — IRBESARTAN 300 MG/1
TABLET ORAL
Qty: 30 TAB | Refills: 5 | Status: SHIPPED | OUTPATIENT
Start: 2017-12-04 | End: 2018-06-01 | Stop reason: SDUPTHER

## 2018-05-16 NOTE — PERIOP NOTES
[address]  Endoscopy Preprocedure Instructions      1. On the day of your surgery, please report to registration located on the 2nd floor of the  [facility]. yes    2. You must have a responsible adult to drive you to the hospital, stay at the hospital during your procedure and drive you home. If they leave your procedure will not be started (no exceptions). yes    3. Do not have anything to eat or drink (including water, gum, mints, coffee, and juice) after midnight. This does not apply to the medications you were instructed to take by your physician. yesIf you are currently taking Plavix, Coumadin, Aspirin, or other blood-thinning agents, contact your physician for special instructions. yes    4. If you are having a procedure that requires bowel prep: We recommend that you have only clear liquids the day before your procedure and begin your bowel prep by 5:00 pm.  You may continue to drink clear liquids until midnight. If for any reason you are not able to complete your prep please notify your physician immediately. yes    5. Have a list of all current medications, including vitamins, herbal supplements and any other over the counter medications. yes    6. If you wear glasses, contacts, dentures and/or hearing aids, they may be removed prior to procedure, please bring a case to store them in. yes    7. You should understand that if you do not follow these instructions your procedure may be cancelled. If your physical condition changes (I.e. fever, cold or flu) please contact your doctor as soon as possible. 8. It is important that you be on time.   If for any reason you are unable to keep your appointment please call [phone] the day of or your physicians office prior to your scheduled procedure

## 2018-05-21 ENCOUNTER — ANESTHESIA (OUTPATIENT)
Dept: ENDOSCOPY | Age: 73
End: 2018-05-21
Payer: MEDICARE

## 2018-05-21 ENCOUNTER — ANESTHESIA EVENT (OUTPATIENT)
Dept: ENDOSCOPY | Age: 73
End: 2018-05-21
Payer: MEDICARE

## 2018-05-21 ENCOUNTER — HOSPITAL ENCOUNTER (OUTPATIENT)
Age: 73
Setting detail: OUTPATIENT SURGERY
Discharge: HOME | End: 2018-05-21
Attending: INTERNAL MEDICINE | Admitting: INTERNAL MEDICINE
Payer: MEDICARE

## 2018-05-21 VITALS
OXYGEN SATURATION: 99 % | DIASTOLIC BLOOD PRESSURE: 81 MMHG | SYSTOLIC BLOOD PRESSURE: 140 MMHG | HEART RATE: 46 BPM | WEIGHT: 208 LBS | BODY MASS INDEX: 32.65 KG/M2 | HEIGHT: 67 IN | TEMPERATURE: 97.5 F

## 2018-05-21 PROCEDURE — 88305 TISSUE EXAM BY PATHOLOGIST: CPT | Performed by: INTERNAL MEDICINE

## 2018-05-21 PROCEDURE — 76060000031 HC ANESTHESIA FIRST 0.5 HR: Performed by: INTERNAL MEDICINE

## 2018-05-21 PROCEDURE — 76040000019: Performed by: INTERNAL MEDICINE

## 2018-05-21 PROCEDURE — 74011250636 HC RX REV CODE- 250/636

## 2018-05-21 PROCEDURE — 77030009426 HC FCPS BIOP ENDOSC BSC -B: Performed by: INTERNAL MEDICINE

## 2018-05-21 RX ORDER — EPINEPHRINE 0.1 MG/ML
1 INJECTION INTRACARDIAC; INTRAVENOUS
Status: DISCONTINUED | OUTPATIENT
Start: 2018-05-21 | End: 2018-05-21 | Stop reason: HOSPADM

## 2018-05-21 RX ORDER — PROPOFOL 10 MG/ML
INJECTION, EMULSION INTRAVENOUS AS NEEDED
Status: DISCONTINUED | OUTPATIENT
Start: 2018-05-21 | End: 2018-05-21 | Stop reason: HOSPADM

## 2018-05-21 RX ORDER — DEXTROMETHORPHAN/PSEUDOEPHED 2.5-7.5/.8
1.2 DROPS ORAL
Status: DISCONTINUED | OUTPATIENT
Start: 2018-05-21 | End: 2018-05-21 | Stop reason: HOSPADM

## 2018-05-21 RX ORDER — TAMSULOSIN HYDROCHLORIDE 0.4 MG/1
0.4 CAPSULE ORAL DAILY
COMMUNITY

## 2018-05-21 RX ORDER — NALOXONE HYDROCHLORIDE 0.4 MG/ML
0.4 INJECTION, SOLUTION INTRAMUSCULAR; INTRAVENOUS; SUBCUTANEOUS
Status: DISCONTINUED | OUTPATIENT
Start: 2018-05-21 | End: 2018-05-21 | Stop reason: HOSPADM

## 2018-05-21 RX ORDER — MIDAZOLAM HYDROCHLORIDE 1 MG/ML
.25-5 INJECTION, SOLUTION INTRAMUSCULAR; INTRAVENOUS
Status: DISCONTINUED | OUTPATIENT
Start: 2018-05-21 | End: 2018-05-21 | Stop reason: HOSPADM

## 2018-05-21 RX ORDER — SODIUM CHLORIDE 9 MG/ML
50 INJECTION, SOLUTION INTRAVENOUS CONTINUOUS
Status: DISCONTINUED | OUTPATIENT
Start: 2018-05-21 | End: 2018-05-21 | Stop reason: HOSPADM

## 2018-05-21 RX ORDER — LIDOCAINE HYDROCHLORIDE 20 MG/ML
INJECTION, SOLUTION EPIDURAL; INFILTRATION; INTRACAUDAL; PERINEURAL AS NEEDED
Status: DISCONTINUED | OUTPATIENT
Start: 2018-05-21 | End: 2018-05-21 | Stop reason: HOSPADM

## 2018-05-21 RX ORDER — SODIUM CHLORIDE 9 MG/ML
INJECTION, SOLUTION INTRAVENOUS
Status: DISCONTINUED | OUTPATIENT
Start: 2018-05-21 | End: 2018-05-21 | Stop reason: HOSPADM

## 2018-05-21 RX ORDER — FLUMAZENIL 0.1 MG/ML
0.2 INJECTION INTRAVENOUS
Status: DISCONTINUED | OUTPATIENT
Start: 2018-05-21 | End: 2018-05-21 | Stop reason: HOSPADM

## 2018-05-21 RX ORDER — ATROPINE SULFATE 0.1 MG/ML
0.4 INJECTION INTRAVENOUS
Status: DISCONTINUED | OUTPATIENT
Start: 2018-05-21 | End: 2018-05-21 | Stop reason: HOSPADM

## 2018-05-21 RX ADMIN — PROPOFOL 100 MG: 10 INJECTION, EMULSION INTRAVENOUS at 13:04

## 2018-05-21 RX ADMIN — PROPOFOL 100 MG: 10 INJECTION, EMULSION INTRAVENOUS at 12:56

## 2018-05-21 RX ADMIN — SODIUM CHLORIDE: 9 INJECTION, SOLUTION INTRAVENOUS at 12:55

## 2018-05-21 NOTE — PROGRESS NOTES
Assumed care of patient from anesthesia. Endoscope was pre-cleaned at bedside immediately following procedure by [facility]. See anesthesia flow-sheet for procedural sedation and vital signs. No respiratory distress. Abdomen without distention. Stable for transfer to recovery per anesthesia.

## 2018-05-21 NOTE — ANESTHESIA PREPROCEDURE EVALUATION
Anesthetic History   No history of anesthetic complications            Review of Systems / Medical History  Patient summary reviewed, nursing notes reviewed and pertinent labs reviewed    Pulmonary  Within defined limits      Sleep apnea           Neuro/Psych         Psychiatric history (panic)     Cardiovascular    Hypertension        Dysrhythmias (sinus anton)       Exercise tolerance: >4 METS  Comments: Not on beta blocker    3/17  Negative exercise echocardiogram for inducible ischemia at adequate heart   rate.     GI/Hepatic/Renal     GERD      Hiatal hernia     Endo/Other  Within defined limits      Arthritis     Other Findings            Physical Exam    Airway  Mallampati: II  TM Distance: 4 - 6 cm  Neck ROM: normal range of motion   Mouth opening: Diminished (comment)     Cardiovascular  Regular rate and rhythm,  S1 and S2 normal,  no murmur, click, rub, or gallop  Rhythm: regular  Rate: normal         Dental    Dentition: Caps/crowns     Pulmonary  Breath sounds clear to auscultation               Abdominal  GI exam deferred       Other Findings            Anesthetic Plan    ASA: 2  Anesthesia type: MAC            Anesthetic plan and risks discussed with: Patient

## 2018-05-21 NOTE — ANESTHESIA POSTPROCEDURE EVALUATION
Post-Anesthesia Evaluation and Assessment    Patient: Christie Casas MRN: [id number]  SSN: [id number]    YOB: 1945  Age: 72 y.o. Sex: male       Cardiovascular Function/Vital Signs  Visit Vitals    /82    Pulse (!) 54    Temp 36.3 °C (97.4 °F)    Resp 14    Ht 5' 7\" (1.702 m)    Wt 94.3 kg (208 lb)    SpO2 97%    BMI 32.58 kg/m2       Patient is status post MAC anesthesia for Procedure(s):  ESOPHAGOGASTRODUODENOSCOPY (EGD)  ESOPHAGOGASTRODUODENAL (EGD) BIOPSY. Nausea/Vomiting: None    Postoperative hydration reviewed and adequate. Pain:  Pain Scale 1: Numeric (0 - 10) (05/21/18 1211)  Pain Intensity 1: 2 (05/21/18 1211)   Managed    Neurological Status: At baseline    Mental Status and Level of Consciousness: Arousable    Pulmonary Status:   O2 Device: Room air (05/21/18 1211)   Adequate oxygenation and airway patent    Complications related to anesthesia: None    Post-anesthesia assessment completed.  No concerns    Signed By: Amy Cedeno MD     May 21, 2018

## 2018-05-21 NOTE — PROGRESS NOTES
Francesca Murrell  1945  [id number]    Situation:  Verbal report received from: Brittany Gonzales rn  Procedure: Procedure(s):  ESOPHAGOGASTRODUODENOSCOPY (EGD)  ESOPHAGOGASTRODUODENAL (EGD) BIOPSY    Background:    Preoperative diagnosis: DYSPHAGIA  Postoperative diagnosis: Pool's Esophagus  Hiatal Hernia    :  Dr. Bruno Aguilar  Assistant(s): Endoscopy Technician-1: Saumya Zuniga  Endoscopy RN-1: Melba Guzman RN    Specimens:   ID Type Source Tests Collected by Time Destination   1 : 35 cm esophagus Laura Ferrell MD 5/21/2018 1311 Pathology   2 : 33 cm esophagus Preservative   Josué Zamora MD 5/21/2018 1312 Pathology   3 : 31 cm esophagus Laura Ferrell MD 5/21/2018 1312 Pathology     H. Pylori  no    Assessment:  Intra-procedure medications   Anesthesia gave intra-procedure sedation and medications, see anesthesia flow sheet yes    Intravenous fluids: NS@ KVO     Vital signs stable     Abdominal assessment: round and soft     Recommendation:  Discharge patient per MD order. Famil  Permission to share finding with family or friend yes  Endoscopy discharge instructions have been reviewed and given to patient. The patient verbalized understanding and acceptance of instructions.

## 2018-05-21 NOTE — DISCHARGE INSTRUCTIONS
Negro Reynoso M.D.  [phone]           2018  Tucker Catalan  :  1945  [facility] Medical Record Number:  [id number]        ENDOSCOPY FINDINGS:   Your endoscopy showed unchanged small's esophagus and small hiatal hernia. Biopsies were obtained. EGD DISCHARGE INSTRUCTIONS    DISCOMFORT:  Sore throat- throat lozenges or warm salt water gargle  redness at IV site- apply warm compress to area; if redness or soreness persist- contact your physician  Gaseous discomfort- walking, belching will help relieve any discomfort  You may not operate a vehicle for 12 hours  You may not engage in an occupation involving machinery or appliances for rest of today  You may not drink alcoholic beverages for at least 12 hours  Avoid making any critical decisions for at least 24 hour    DIET:   You may resume your regular diet. ACTIVITY  Spend the remainder of the day resting -  avoid any strenuous activity. Avoid driving or operating machinery. CALL M.D. ANY SIGN OF   Increasing pain, nausea, vomiting  Abdominal distension (swelling)  New increased bleeding (oral or rectal)  Fever (chills)  Pain in chest area  Bloody discharge from nose or mouth  Shortness of breath    Follow-up Instructions:   Call Dr. Bandar Cordero for any questions or problems. Telephone # [phone]  Biopsies were obtained, the results will be available  in  5 to 7 days. We will call you to notify you of these results. Continue same medications. Follow up in the office.

## 2018-05-21 NOTE — H&P
Rosalva Ramirez M.D.  [phone]            History and Physical       NAME:  Colby Mccall   :   1945   MRN:   [id number]           Consult Date: 2018 12:53 PM    Chief Complaint:  Pool's esophagus    History of Present Illness:  Patient is a 72 y.o. who is seen for Pool's surveillance. Denies any ongoing GI complaints. PMH:  Past Medical History:   Diagnosis Date    Abnormal celiac antibody panel 2017    Ankle fracture, left 6/29/15    Dr. Alton Nevarez. and prox fibula. s/p surgery    AR (allergic rhinitis)     Arthritis     ritesh hands    Elevated prostate specific antigen (PSA)     Dr. Alex Machado. Dr. Torrey Gamboa peak 14.3, bx 2008,     Esophageal motility disorder 2017    +hiatal hernia    GERD (gastroesophageal reflux disease)     Hearing loss     hearing test 2009- bilat.  HTN (hypertension)     diet treated, stress echo  nl    Left leg paresthesias     medical left leg. since left leg fracuture 2015    Normal cardiac stress test 2017    Onychomycosis     CLARICE (obstructive sleep apnea)     Dr. Pete Mckeon. wears CPAP at night setting 8.5    Panic attacks     work-related    Pleurisy     Right inguinal hernia     Sinus bradycardia     Squamous cell carcinoma     left temple Dr. María Elena Alvarez UTI (urinary tract infection) 2008       PSH:  Past Surgical History:   Procedure Laterality Date    COLONOSCOPY      normal except hemorrhoids    HX ANKLE FRACTURE TX Left 6/29/15    Dr. Alton Nevarez    HX COLONOSCOPY  10/3/14    1 hyperplastic polyp. Milka Morales HERNIA REPAIR Bilateral 2014    Dr. Chet Morales OTHER SURGICAL      Laparoscopic bilateral inguinal hernia repair with mesh    HX TONSILLECTOMY      HX UROLOGICAL  2005    prostate biopsy x 2- benign    PROSTATE BIOPSIES  ,     saw Dr. Torrey Gamboa.   Now Dr. Alex Machado       Allergies:  No Known Allergies    Home Medications:  Prior to Admission Medications   Prescriptions Last Dose Informant Patient Reported? Taking? SAW PALMETTO PO 5/20/2018 at Unknown time  Yes Yes   Sig: Take  by mouth. cholecalciferol (VITAMIN D3) 1,000 unit cap 5/20/2018 at Unknown time  No Yes   Sig: Take 2 Caps by mouth daily. cpap machine kit 5/20/2018 at Unknown time  Yes Yes   Sig: by Does Not Apply route. irbesartan (AVAPRO) 300 mg tablet 5/20/2018 at Unknown time  No Yes   Sig: take 1 tablet by mouth every evening   omeprazole (PRILOSEC) 20 mg capsule 5/20/2018 at Unknown time  Yes Yes   raNITIdine (ZANTAC) 300 mg tablet Not Taking at Unknown time  No No   Sig: Take 1 Tab by mouth daily. tamsulosin (FLOMAX) 0.4 mg capsule 5/20/2018 at Unknown time  Yes Yes   Sig: Take 0.4 mg by mouth daily.       Facility-Administered Medications: None       Hospital Medications:  Current Facility-Administered Medications   Medication Dose Route Frequency    0.9% sodium chloride infusion  50 mL/hr IntraVENous CONTINUOUS    midazolam (VERSED) injection 0.25-5 mg  0.25-5 mg IntraVENous Multiple    naloxone (NARCAN) injection 0.4 mg  0.4 mg IntraVENous Multiple    flumazenil (ROMAZICON) 0.1 mg/mL injection 0.2 mg  0.2 mg IntraVENous Multiple    simethicone (MYLICON) 73BS/1.9KO oral drops 80 mg  1.2 mL Oral Multiple    atropine injection 0.4 mg  0.4 mg IntraVENous ONCE PRN    EPINEPHrine (ADRENALIN) 0.1 mg/mL syringe 1 mg  1 mg Endoscopically ONCE PRN       Social History:  Social History   Substance Use Topics    Smoking status: Former Smoker     Packs/day: 1.00     Years: 10.00     Types: Cigarettes     Quit date: 1/1/1978    Smokeless tobacco: Never Used    Alcohol use 2.5 oz/week     1 Glasses of wine, 1 Cans of beer, 3 Standard drinks or equivalent per week      Comment: occassionally       Family History:  Family History   Problem Relation Age of Onset    Cancer Mother      leukemia    Celiac Disease Mother     Diabetes Father      age 58    Hypertension Father     Heart Disease Father     Celiac Disease Daughter     Heart Disease Maternal Grandmother              Review of Systems:      Constitutional: negative fever, negative chills, negative weight loss  Eyes:   negative visual changes  ENT:   negative sore throat, tongue or lip swelling  Respiratory:  negative cough, negative dyspnea  Cards:  negative for chest pain, palpitations, lower extremity edema  GI:   See HPI  :  negative for frequency, dysuria  Integument:  negative for rash and pruritus  Heme:  negative for easy bruising and gum/nose bleeding  Musculoskel: negative for myalgias,  back pain and muscle weakness  Neuro: negative for headaches, dizziness, vertigo  Psych:  negative for feelings of anxiety, depression       Objective:   Patient Vitals for the past 8 hrs:   BP Temp Pulse Resp SpO2 Height Weight   05/21/18 1211 144/82 97.4 °F (36.3 °C) (!) 54 14 97 % 5' 7\" (1.702 m) 94.3 kg (208 lb)             EXAM:     NEURO-a&o   HEENT-wnl   LUNGS-clear    COR-regular rate and rhythym     ABD-soft , no tenderness, no rebound, good bs     EXT-no edema     Data Review     No results for input(s): WBC, HGB, HCT, PLT, HGBEXT, HCTEXT, PLTEXT in the last 72 hours. No results for input(s): NA, K, CL, CO2, BUN, CREA, GLU, PHOS, CA in the last 72 hours. No results for input(s): SGOT, GPT, AP, TBIL, TP, ALB, GLOB, GGT, AML, LPSE in the last 72 hours. No lab exists for component: AMYP, HLPSE  No results for input(s): INR, PTP, APTT in the last 72 hours.     No lab exists for component: INREXT    Patient Active Problem List   Diagnosis Code    HTN (hypertension) I10    Sinus bradycardia R00.1    AR (allergic rhinitis) J30.9    GERD (gastroesophageal reflux disease) K21.9    Hearing loss H91.90    UTI (urinary tract infection) N39.0    Onychomycosis B35.1    Right inguinal hernia K40.90    Elevated prostate specific antigen (PSA) R97.20    CLARICE (obstructive sleep apnea) G47.33    Bilateral inguinal hernia K40.20  Ankle fracture, left O57.124M    Advanced care planning/counseling discussion Z71.89    Abnormal celiac antibody panel R89.4    Normal cardiac stress test Z13.6      Assessment:   · Pool's esophagus   Plan:   · EGD today.      Signed By: Soriada Alvarado MD     5/21/2018  12:53 PM

## 2018-05-21 NOTE — PROCEDURES
Ruthie Resendiz M.D.  [phone]           2018                EGD Operative Report  Devonte Reveles  :  1945  [facility] Medical Record Number:  [id number]      Indication:  Small's/esophageal ulcer     : Omari Bergman MD    Referring Provider:  Caprice Damian MD      Anesthesia/Sedation:  MAC anesthesia    Airway assessment: No airway problems anticipated    Pre-Procedural Exam:      Airway: clear, no airway problems anticipated  Heart: RRR, without gallops or rubs  Lungs: clear bilaterally without wheezes, crackles, or rhonchi  Abdomen: soft, nontender, nondistended, bowel sounds present  Mental Status: awake, alert and oriented to person, place and time       Procedure Details     After infomed consent was obtained for the procedure, with all risks and benefits of procedure explained the patient was taken to the endoscopy suite and placed in the left lateral decubitus position. Following sequential administration of sedation as per above, the endoscope was inserted into the mouth and advanced under direct vision to second portion of the duodenum. A careful inspection was made as the gastroscope was withdrawn, including a retroflexed view of the proximal stomach; findings and interventions are described below. Findings:   Esophagus:Evidence of salmon colored mucosa noted from 35 cm up to 31 cm consistent with small's esophagus  Stomach: Small size hiatal hernia, otherwise mucosa within normal.  Duodenum/jejunum: normal    Therapies:  none    Specimens: Four quadrant biopsies obtained at 35 cm from the gums, 33 cm and 31 cm           Complications:   None; patient tolerated the procedure well. EBL:  None. Impression:    Small's Esophagus  Hiatal Hernia      Recommendations:    -Continue acid suppression.  -Await pathology. -GERD diet: avoid fried and fatty foods.  peppermint, chocolate, alcohol, coffee, citrus fruits and juices, tomoato products; avoid lying down for 2 to 3 hours after eating.     Mo Valerio MD

## 2018-05-21 NOTE — IP AVS SNAPSHOT
[facility] 
 
 
 [address] [place] [address] [address] [facility] 
[phone] Patient: Titus Cristina MRN: [id number] WFV:[id number] About your hospitalization You were admitted on:  May 21, 2018 You last received care in the:  [facility] ENDOSCOPY You were discharged on:  May 21, 2018 Why you were hospitalized Your primary diagnosis was:  Not on File Follow-up Information None Discharge Orders None A check angela indicates which time of day the medication should be taken. My Medications CONTINUE taking these medications Instructions Each Dose to Equal  
 Morning Noon Evening Bedtime  
 cholecalciferol 1,000 unit Cap Commonly known as:  VITAMIN D3 Your last dose was: Your next dose is: Take 2 Caps by mouth daily. 2000 Units  
    
   
   
   
  
 cpap machine kit Your last dose was: Your next dose is:    
   
   
 by Does Not Apply route. FLOMAX 0.4 mg capsule Generic drug:  tamsulosin Your last dose was: Your next dose is: Take 0.4 mg by mouth daily. 0.4 mg  
    
   
   
   
  
 irbesartan 300 mg tablet Commonly known as:  AVAPRO Your last dose was: Your next dose is:    
   
   
 take 1 tablet by mouth every evening  
     
   
   
   
  
 omeprazole 20 mg capsule Commonly known as:  PRILOSEC Your last dose was: Your next dose is:    
   
   
      
   
   
   
  
 raNITIdine 300 mg tablet Commonly known as:  ZANTAC Your last dose was: Your next dose is: Take 1 Tab by mouth daily. 300 mg  
    
   
   
   
  
 SAW PALMETTO PO Your last dose was: Your next dose is: Take  by mouth. Discharge Instructions [address] Paula Srpinger M.D. 
[phone] 5/21/2018 Titus Cristina :  1945 [facility] Record Number:  [id number] ENDOSCOPY FINDINGS: 
 Your endoscopy showed unchanged small's esophagus and small hiatal hernia. Biopsies were obtained. EGD DISCHARGE INSTRUCTIONS DISCOMFORT: 
Sore throat- throat lozenges or warm salt water gargle 
redness at IV site- apply warm compress to area; if redness or soreness persist- contact your physician Gaseous discomfort- walking, belching will help relieve any discomfort You may not operate a vehicle for 12 hours You may not engage in an occupation involving machinery or appliances for rest of today You may not drink alcoholic beverages for at least 12 hours Avoid making any critical decisions for at least 24 hour DIET: 
 You may resume your regular diet. ACTIVITY Spend the remainder of the day resting -  avoid any strenuous activity. Avoid driving or operating machinery. CALL M.D. ANY SIGN OF Increasing pain, nausea, vomiting Abdominal distension (swelling) New increased bleeding (oral or rectal) Fever (chills) Pain in chest area Bloody discharge from nose or mouth Shortness of breath Follow-up Instructions: 
 Call Dr. María Baptiste for any questions or problems. Telephone # [phone] Biopsies were obtained, the results will be available  in  5 to 7 days. We will call you to notify you of these results. Continue same medications. Follow up in the office. ACO Transitions of Care [facility] offers a voluntary care coordination program to provide high quality service and care to [facility] fee-for-service beneficiaries. Laine Dimas was designed to help you enhance your health and well-being through the following services: ? Transitions of Care  support for individuals who are transitioning from one care setting to another (example: Hospital to home). ? Chronic and Complex Care Coordination  support for individuals and caregivers of those with serious or chronic illnesses or with more than one chronic (ongoing) condition and those who take a number of different medications. If you meet specific medical criteria, a [address] [place] [address] Rd may call you directly to coordinate your care with your primary care physician and your other care providers. For questions about the [facility] programs, please, contact your physicians office. For general questions or additional information about Accountable Care Organizations: 
Please visit www.medicare.gov/acos. html or call 1-800-MEDICARE (1-828.978.3553) TTY users should call 1-909.477.9122. Introducing [facility] & HEALTH SERVICES! Dear Bernie Aggarwal: Thank you for requesting a [employer] account. Our records indicate that you already have an active [employer] account. You can access your account anytime at [email or web address] Did you know that you can access your hospital and ER discharge instructions at any time in [employer]? You can also review all of your test results from your hospital stay or ER visit. Additional Information If you have questions, please visit the Frequently Asked Questions section of the [employer] website at [email or web address] Remember, [employer] is NOT to be used for urgent needs. For medical emergencies, dial 911. Now available from your iPhone and Android! Introducing Marlon Gamez As a [facility] patient, I wanted to make you aware of our electronic visit tool called [facility]. [facility] 24/7 allows you to connect within minutes with a medical provider 24 hours a day, seven days a week via a mobile device or tablet or logging into a secure website from your computer. You can access Marlon Gamez from anywhere in the [place]. A virtual visit might be right for you when you have a simple condition and feel like you just dont want to get out of bed, or cant get away from work for an appointment, when your regular Jaleel [facility] provider is not available (evenings, weekends or holidays), or when youre out of town and need minor care. Electronic visits cost only $49 and if the [facility] 24/7 provider determines a prescription is needed to treat your condition, one can be electronically transmitted to a nearby pharmacy*. Please take a moment to enroll today if you have not already done so. The enrollment process is free and takes just a few minutes. To enroll, please download the [employer] 24/7 bryon to your tablet or phone, or visit [email or web address] to enroll on your computer. And, as an [address] [place] [address] patient with a [employer] account, the results of your visits will be scanned into your electronic medical record and your primary care provider will be able to view the scanned results. We urge you to continue to see your regular Jaleel Jorge provider for your ongoing medical care. And while your primary care provider may not be the one available when you seek a Marlon [facility]if[employer] virtual visit, the peace of mind you get from getting a real diagnosis real time can be priceless. For more information on Marlon [employer]ixravif[employer], view our Frequently Asked Questions (FAQs) at [email or web address]. Sincerely, 
 
Hector Purdy MD 
Chief Medical Officer [employer] *:  certain medications cannot be prescribed via [facility] [employer] Providers Seen During Your Hospitalization Provider Specialty Primary office phone Manuelito Kelley MD Gastroenterology [phone] Your Primary Care Physician (PCP) Primary Care Physician Office Phone Office Fax Sonya Cortez [phone] You are allergic to the following No active allergies Recent Documentation Height Weight BMI Smoking Status 1.702 m 94.3 kg 32.58 kg/m2 Former Smoker Emergency Contacts Name Discharge Info Relation Home Work Mobile Laurita Nelson DISCHARGE CAREGIVER [3] Spouse [3] [phone] BELÉN [facility] [employer] [facility] [place] DISCHARGE CAREGIVER [3] Sister [23] [phone] Patient Belongings The following personal items are in your possession at time of discharge: 
  Dental Appliances: None  Visual Aid: Glasses Please provide this summary of care documentation to your next provider. Signatures-by signing, you are acknowledging that this After Visit Summary has been reviewed with you and you have received a copy. Patient Signature:  ____________________________________________________________ Date:  ____________________________________________________________  
  
Angela Mason Provider Signature:  ____________________________________________________________ Date:  ____________________________________________________________

## 2018-06-02 RX ORDER — IRBESARTAN 300 MG/1
TABLET ORAL
Qty: 30 TAB | Refills: 5 | Status: SHIPPED | OUTPATIENT
Start: 2018-06-02 | End: 2018-06-21 | Stop reason: SDUPTHER

## 2018-06-21 ENCOUNTER — OFFICE VISIT (OUTPATIENT)
Dept: INTERNAL MEDICINE CLINIC | Age: 73
End: 2018-06-21

## 2018-06-21 ENCOUNTER — TELEPHONE (OUTPATIENT)
Dept: INTERNAL MEDICINE CLINIC | Age: 73
End: 2018-06-21

## 2018-06-21 VITALS
SYSTOLIC BLOOD PRESSURE: 110 MMHG | DIASTOLIC BLOOD PRESSURE: 70 MMHG | TEMPERATURE: 98.1 F | HEIGHT: 67 IN | BODY MASS INDEX: 31.2 KG/M2 | OXYGEN SATURATION: 97 % | WEIGHT: 198.8 LBS | RESPIRATION RATE: 18 BRPM | HEART RATE: 91 BPM

## 2018-06-21 DIAGNOSIS — R31.9 HEMATURIA, UNSPECIFIED TYPE: ICD-10-CM

## 2018-06-21 DIAGNOSIS — I10 ESSENTIAL HYPERTENSION: ICD-10-CM

## 2018-06-21 DIAGNOSIS — I95.9 HYPOTENSION, UNSPECIFIED HYPOTENSION TYPE: Primary | ICD-10-CM

## 2018-06-21 LAB — HGB BLD-MCNC: 11.8 G/DL

## 2018-06-21 RX ORDER — IRBESARTAN 300 MG/1
TABLET ORAL
Qty: 30 TAB | Refills: 5
Start: 2018-06-21 | End: 2018-07-23 | Stop reason: SDUPTHER

## 2018-06-21 RX ORDER — ESOMEPRAZOLE MAGNESIUM 40 MG/1
40 CAPSULE, DELAYED RELEASE ORAL AS NEEDED
COMMUNITY
End: 2018-07-23 | Stop reason: ALTCHOICE

## 2018-06-21 NOTE — PROGRESS NOTES
HISTORY OF PRESENT ILLNESS    Presents with concerns of low BP this AM, 73/49- 108/63. Was working in the yard today cutting a tree. 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 Taking HTN med avapro. Home BP has been 120/80s  Taking flomax for 3 months per Dr. Apolonia Marin  Right neck pain for 3 weeks. Intermittent. Spasm/shooting    Review of Systems   All other systems reviewed and are negative, except as noted in HPI    Past Medical and Surgical History   has a past medical history of Abnormal celiac antibody panel (01/2017); Ankle fracture, left (6/29/15); AR (allergic rhinitis); Arthritis; Pool esophagus; Elevated prostate specific antigen (PSA); Enlarged prostate; Esophageal motility disorder (01/2017); GERD (gastroesophageal reflux disease); Hearing loss; HTN (hypertension); Left leg paresthesias; Normal cardiac stress test (03/07/2017); Onychomycosis; CLARICE (obstructive sleep apnea) (2003); Panic attacks (2002); Pleurisy; Right inguinal hernia; Sinus bradycardia; Squamous cell carcinoma (1989); and UTI (urinary tract infection) (04/2008). He also has no past medical history of Diabetes ([address] or Nausea & vomiting. has a past surgical history that includes colonoscopy (2002); prostate biopsies (2006, 2008); hx colonoscopy (10/3/14); hx tonsillectomy; hx urological (2005 2008); hx hernia repair (Bilateral, 12/19/2014); hx ankle fracture tx (Left, 6/29/15); and hx other surgical (). reports that he quit smoking about 40 years ago. His smoking use included Cigarettes. He has a 10.00 pack-year smoking history. He has never used smokeless tobacco. He reports that he drinks about 2.5 oz of alcohol per week  He reports that he does not use illicit drugs.   family history includes Cancer in his mother; Celiac Disease in his daughter and mother; Diabetes in his father; Heart Disease in his father and maternal grandmother; Hypertension in his father. Physical Exam   Nursing note and vitals reviewed. Blood pressure 110/70, pulse 91, temperature 98.1 °F (36.7 °C), temperature source Oral, resp. rate 18, height 5' 7\" (1.702 m), weight 198 lb 12.8 oz (90.2 kg), SpO2 97 %. Constitutional: In no distress. Eyes: Conjunctivae are normal.  HEENT:  No LAD or thyromegaly   Cardiovascular: Normal rate. regular rhythm. No murmurs  No edema  Pulmonary/Chest: Effort normal. clear to ausculation blaterally  Musculoskeletal:  no edema. Abd:  Neurological: Alert and oriented. Grossly intact cranial nerves and motor function. Skin: No rash noted. Psychiatric: Normal mood and affect. Behavior is normal.     ASSESSMENT and PLAN  Diagnoses and all orders for this visit:    1. Hypotension, unspecified hypotension type  Likely combination of dehydration, GI antihypertensive, Flomax. Hemoglobin remains normal.  Push fluids. Decrease irbesartan. 2. Essential hypertension  Over controlled. Decreased dose  -     irbesartan (AVAPRO) 300 mg tablet; take 1/2 tablet by mouth every evening    3. Hematuria, unspecified type  Asymptomatic. Hemoglobin today is normal.  -     AMB POC HEMOGLOBIN (HGB)        lab results and schedule of future lab studies reviewed with patient  reviewed medications and side effects in detail    Return to clinic for further evaluation if new symptoms develop or if current symptoms worsen or fail to resolve. There are no Patient Instructions on file for this visit.

## 2018-06-21 NOTE — TELEPHONE ENCOUNTER
Hold irbesartan. He had an esophagus biopsy last month and I am concerned he could have some bleeding which may contribute. Can he come in for hgb check today?

## 2018-06-21 NOTE — TELEPHONE ENCOUNTER
Patient request a return call regarding low BP.  BP reads 86/56 Patient best contact [phone] (H)

## 2018-06-21 NOTE — MR AVS SNAPSHOT
[facility] 
 
 
 [address]  [place] [address] 
[phone] Patient: Titus Cristina MRN: [id number] Visit Information Date & Time Provider Department Dept. Phone Encounter #  
 6/21/2018  2:45 PM Jazmin Singer MD [facility] [id number] Your Appointments 7/23/2018  9:00 AM  
Medicare Physical with Jazmin Singer MD  
[facility] Appt Note: Medication Wellness [address]  [facility] [address]  
[phone]  
  
   
 [address] [facility] [address] Upcoming Health Maintenance Date Due  
 GLAUCOMA SCREENING Q2Y 12/9/2017 MEDICARE YEARLY EXAM 3/14/2018 COLONOSCOPY 10/2/2019 DTaP/Tdap/Td series (2 - Td) 11/21/2021 Allergies as of 6/21/2018  Review Complete On: 6/21/2018 By: Angel Luis Galeas LPN No Known Allergies Current Immunizations  Reviewed on 10/27/2016 Name Date Pneumococcal Conjugate (PCV-13) 1/26/2017 TD Vaccine 9/22/2002 TDAP Vaccine 11/21/2011 ZZZ-RETIRED (DO NOT USE) Pneumococcal Vaccine (Unspecified Type) 12/3/2012 Zoster Vaccine, Live 3/1/2014 Not reviewed this visit You Were Diagnosed With   
  
 Codes Comments Hypotension, unspecified hypotension type    -  Primary ICD-10-CM: I95.9 ICD-9-CM: 458.9 Essential hypertension     ICD-10-CM: I10 
ICD-9-CM: 401.9 Hematuria, unspecified type     ICD-10-CM: R31.9 ICD-9-CM: 599.70 Vitals BP Pulse Temp Resp Height(growth percentile) Weight(growth percentile) 110/70 (BP 1 Location: Left arm, BP Patient Position: Standing) 91 98.1 °F (36.7 °C) (Oral) 18 5' 7\" (1.702 m) 198 lb 12.8 oz (90.2 kg) SpO2 BMI Smoking Status 97% 31.14 kg/m2 Former Smoker Vitals History BMI and BSA Data  Body Mass Index Body Surface Area  
 31.14 kg/m 2 2.06 m 2  
  
  
 Preferred Pharmacy Pharmacy Name Phone [facility]  [phone] Your Updated Medication List  
  
   
This list is accurate as of 6/21/18  4:09 PM.  Always use your most recent med list.  
  
  
  
  
 cholecalciferol 1,000 unit Cap Commonly known as:  VITAMIN D3 Take 2 Caps by mouth daily. cpap machine kit  
by Does Not Apply route.  
  
 esomeprazole 40 mg capsule Commonly known as:  Brandon Breed Take  by mouth daily. FLOMAX 0.4 mg capsule Generic drug:  tamsulosin Take 0.4 mg by mouth daily. irbesartan 300 mg tablet Commonly known as:  AVAPRO  
take 1/2 tablet by mouth every evening  
  
 omeprazole 20 mg capsule Commonly known as:  PRILOSEC  
  
 SAW PALMETTO PO Take  by mouth. We Performed the Following AMB POC HEMOGLOBIN (HGB) [35226 CPT(R)] Introducing \[facility]\"" & [facility] SERVICES! Dear Darryl Ibarra: Thank you for requesting a Storyz account. Our records indicate that you already have an active Storyz account. You can access your account anytime at [email or web address]. [email or web address] Mobile Did you know that you can access your hospital and ER discharge instructions at any time in Storyz? You can also review all of your test results from your hospital stay or ER visit. Additional Information If you have questions, please visit the Frequently Asked Questions section of the Storyz website at [email or web address] Mobile/. Remember, Storyz is NOT to be used for urgent needs. For medical emergencies, dial 911. Now available from your iPhone and Android! Please provide this summary of care documentation to your next provider. Your primary care clinician is listed as Andrew Anton. If you have any questions after today's visit, please call [phone].

## 2018-07-23 ENCOUNTER — HOSPITAL ENCOUNTER (OUTPATIENT)
Dept: LAB | Age: 73
Discharge: HOME | End: 2018-07-23
Payer: MEDICARE

## 2018-07-23 ENCOUNTER — OFFICE VISIT (OUTPATIENT)
Dept: INTERNAL MEDICINE CLINIC | Age: 73
End: 2018-07-23

## 2018-07-23 VITALS
RESPIRATION RATE: 18 BRPM | HEIGHT: 67 IN | SYSTOLIC BLOOD PRESSURE: 162 MMHG | OXYGEN SATURATION: 97 % | WEIGHT: 198.8 LBS | HEART RATE: 51 BPM | BODY MASS INDEX: 31.2 KG/M2 | TEMPERATURE: 97.6 F | DIASTOLIC BLOOD PRESSURE: 80 MMHG

## 2018-07-23 DIAGNOSIS — M20.42 HAMMERTOE OF LEFT FOOT: ICD-10-CM

## 2018-07-23 DIAGNOSIS — Z13.39 SCREENING FOR ALCOHOLISM: ICD-10-CM

## 2018-07-23 DIAGNOSIS — R97.20 ELEVATED PROSTATE SPECIFIC ANTIGEN (PSA): ICD-10-CM

## 2018-07-23 DIAGNOSIS — B35.1 ONYCHOMYCOSIS: ICD-10-CM

## 2018-07-23 DIAGNOSIS — Z71.89 ADVANCED CARE PLANNING/COUNSELING DISCUSSION: ICD-10-CM

## 2018-07-23 DIAGNOSIS — Z71.89 ADVANCED DIRECTIVES, COUNSELING/DISCUSSION: ICD-10-CM

## 2018-07-23 DIAGNOSIS — I10 ESSENTIAL HYPERTENSION: ICD-10-CM

## 2018-07-23 DIAGNOSIS — Z00.00 MEDICARE ANNUAL WELLNESS VISIT, SUBSEQUENT: Primary | ICD-10-CM

## 2018-07-23 DIAGNOSIS — Z13.31 SCREENING FOR DEPRESSION: ICD-10-CM

## 2018-07-23 PROCEDURE — 85027 COMPLETE CBC AUTOMATED: CPT

## 2018-07-23 PROCEDURE — 80053 COMPREHEN METABOLIC PANEL: CPT

## 2018-07-23 PROCEDURE — 36415 COLL VENOUS BLD VENIPUNCTURE: CPT

## 2018-07-23 PROCEDURE — 80061 LIPID PANEL: CPT

## 2018-07-23 RX ORDER — ZOSTER VACCINE RECOMBINANT, ADJUVANTED 50 MCG/0.5
0.5 KIT INTRAMUSCULAR ONCE
Qty: 0.5 ML | Refills: 1 | Status: SHIPPED | OUTPATIENT
Start: 2018-07-23 | End: 2018-07-23

## 2018-07-23 RX ORDER — IRBESARTAN 150 MG/1
150 TABLET ORAL DAILY
Qty: 30 TAB | Refills: 5 | Status: SHIPPED | OUTPATIENT
Start: 2018-07-23 | End: 2019-01-29 | Stop reason: SDUPTHER

## 2018-07-23 RX ORDER — IRBESARTAN 150 MG/1
TABLET ORAL
Qty: 30 TAB | Refills: 5 | Status: SHIPPED | OUTPATIENT
Start: 2018-07-23 | End: 2018-07-23 | Stop reason: SDUPTHER

## 2018-07-23 NOTE — PATIENT INSTRUCTIONS
Medicare Wellness Visit, Male    The best way to live healthy is to have a lifestyle where you eat a well-balanced diet, exercise regularly, limit alcohol use, and quit all forms of tobacco/nicotine, if applicable. Regular preventive services are another way to keep healthy. Preventive services (vaccines, screening tests, monitoring & exams) can help personalize your care plan, which helps you manage your own care. Screening tests can find health problems at the earliest stages, when they are easiest to treat. [address] [facility] follows the current, evidence-based guidelines published by the [facility] King Shreya ([facility]) when recommending preventive services for our patients. Because we follow these guidelines, sometimes recommendations change over time as research supports it. (For example, a prostate screening blood test is no longer routinely recommended for men with no symptoms.)    Of course, you and your provider may decide to screen more often for some diseases, based on your risk and co-morbidities (chronic disease you are already diagnosed with). Preventive services for you include:    - Medicare offers their members a free annual wellness visit, which is time for you and your primary care provider to discuss and plan for your preventive service needs. Take advantage of this benefit every year!    -All people over age 72 should receive the recommended pneumonia vaccines. Current USPSTF guidelines recommend a series of two vaccines for the best pneumonia protection.     -All adults should have a yearly flu vaccine and a tetanus vaccine every 10 years.  All adults age 61 years should receive a shingles vaccine once in their lifetime.      -All adults age 38-68 years who are overweight should have a diabetes screening test once every three years.     -Other screening tests & preventive services for persons with diabetes include: an eye exam to screen for diabetic retinopathy, a kidney function test, a foot exam, and stricter control over your cholesterol.     -Cardiovascular screening for adults with routine risk involves an electrocardiogram (ECG) at intervals determined by the provider.     -Colorectal cancer screenings should be done for adults age 54-65 years with normal risk. There are a number of acceptable methods of screening for this type of cancer. Each test has its own benefits and drawbacks. Discuss with your provider what is most appropriate for you during your annual wellness visit. The different tests include: colonoscopy (considered the best screening method), a fecal occult blood test, a fecal DNA test, and sigmoidoscopy.    -All adults born between [facility] should be screened once for Hepatitis C.    -An Abdominal Aortic Aneurysm (AAA) Screening is recommended for men age 73-68 who has ever smoked in their lifetime.      Here is a list of your current Health Maintenance items (your personalized list of preventive services) with a due date:  Health Maintenance Due   Topic Date Due    Glaucoma Screening   12/09/2017

## 2018-07-23 NOTE — MR AVS SNAPSHOT
[address] [place] [address] 
 
 
 [address] [place] [address] 
[phone] Patient: Lynne Frazier MRN: [id number] Visit Information Date & Time Provider Department Dept. Phone Encounter #  
 7/23/2018  9:00 AM Wanda Mena MD [facility] [address] [id number] Upcoming Health Maintenance Date Due  
 GLAUCOMA SCREENING Q2Y 12/9/2017 MEDICARE YEARLY EXAM 7/24/2019 COLONOSCOPY 10/2/2019 DTaP/Tdap/Td series (2 - Td) 11/21/2021 Allergies as of 7/23/2018  Review Complete On: 7/23/2018 By: Wanda Mena MD  
 No Known Allergies Current Immunizations  Reviewed on 7/23/2018 Name Date Pneumococcal Conjugate (PCV-13) 1/26/2017 TD Vaccine 9/22/2002 TDAP Vaccine 11/21/2011 ZZZ-RETIRED (DO NOT USE) Pneumococcal Vaccine (Unspecified Type) 12/3/2012 Zoster Vaccine, Live 3/1/2014 Reviewed by Wanda Mena MD on 7/23/2018 at  9:35 AM  
 Reviewed by Wanda Mena MD on 7/23/2018 at  9:43 AM  
You Were Diagnosed With   
  
 Codes Comments Medicare annual wellness visit, subsequent    -  Primary ICD-10-CM: Z00.00 ICD-9-CM: V70.0 Screening for alcoholism     ICD-10-CM: Z13.89 ICD-9-CM: V79.1 Advanced directives, counseling/discussion     ICD-10-CM: Z71.89 ICD-9-CM: V65.49 Screening for depression     ICD-10-CM: Z13.89 ICD-9-CM: V79.0 Essential hypertension     ICD-10-CM: I10 
ICD-9-CM: 401.9 Advanced care planning/counseling discussion     ICD-10-CM: Z71.89 ICD-9-CM: V65.49 Elevated prostate specific antigen (PSA)     ICD-10-CM: R97.20 ICD-9-CM: 790.93 Hammertoe of left foot     ICD-10-CM: M20.42 
ICD-9-CM: 735.4 Onychomycosis     ICD-10-CM: B35.1 ICD-9-CM: 110.1 Vitals BP Pulse Temp Resp Height(growth percentile) Weight(growth percentile)  162/80 (BP 1 Location: Left arm, BP Patient Position: Sitting) (!) 51 97.6 °F (36.4 °C) (Oral) 18 5' 7\" (1.702 m) 198 lb 12.8 oz (90.2 kg) SpO2 BMI Smoking Status 97% 31.14 kg/m2 Former Smoker Vitals History BMI and BSA Data Body Mass Index Body Surface Area  
 31.14 kg/m 2 2.06 m 2 Preferred Pharmacy Pharmacy Name Phone [facility] [address] [facility] [phone] Your Updated Medication List  
  
   
This list is accurate as of 18  9:52 AM.  Always use your most recent med list.  
  
  
  
  
 cholecalciferol 1,000 unit Cap Commonly known as:  VITAMIN D3 Take 2 Caps by mouth daily. cpap machine kit  
by Does Not Apply route. FLOMAX 0.4 mg capsule Generic drug:  tamsulosin Take 0.4 mg by mouth daily. irbesartan 150 mg tablet Commonly known as:  AVAPRO Take 1 Tab by mouth daily. Decreased pill strength 18  
  
 omeprazole 20 mg capsule Commonly known as:  PRILOSEC Take 20 mg by mouth two (2) times a day. SAW PALMETTO PO Take 1 Tab by mouth two (2) times a day. SHINGRIX (PF) 50 mcg/0.5 mL Susr injection Generic drug:  varicella-zoster recombinant (PF)  
0.5 mL by IntraMUSCular route once for 1 dose. Receive 2nd dose in 2-6 months. For Shingles (Zoster) prevention Prescriptions Printed Refills SHINGRIX, PF, 50 mcg/0.5 mL susr injection 1 Si.5 mL by IntraMUSCular route once for 1 dose. Receive 2nd dose in 2-6 months. For Shingles (Zoster) prevention Class: Print Route: IntraMUSCular Prescriptions Sent to Pharmacy Refills  
 irbesartan (AVAPRO) 150 mg tablet 5 Sig: Take 1 Tab by mouth daily. Decreased pill strength 18 Class: Normal  
 [facility]: [id number], [address] [facility] #: [phone] Route: Oral  
  
We Performed the Following CBC W/O DIFF [67871 CPT(R)] [place] [address] [[id number] [facility]] LIPID PANEL [58644 CPT(R)] METABOLIC PANEL, COMPREHENSIVE [99586 CPT(R)] REFERRAL TO PODIATRY [REF90 Custom] Referral Information Referral ID Referred By Referred To  
  
 [id number] [facility], [address] [facility] [address] Visits Status Start Date End Date 1 New Request 7/23/18 7/23/19 If your referral has a status of pending review or denied, additional information will be sent to support the outcome of this decision. Patient Instructions Medicare Wellness Visit, Male The best way to live healthy is to have a lifestyle where you eat a well-balanced diet, exercise regularly, limit alcohol use, and quit all forms of tobacco/nicotine, if applicable. Regular preventive services are another way to keep healthy. Preventive services (vaccines, screening tests, monitoring & exams) can help personalize your care plan, which helps you manage your own care. Screening tests can find health problems at the earliest stages, when they are easiest to treat. [facility] follows the current, evidence-based guidelines published by the [facility] King Cash ([facility]) when recommending preventive services for our patients. Because we follow these guidelines, sometimes recommendations change over time as research supports it. (For example, a prostate screening blood test is no longer routinely recommended for men with no symptoms.) Of course, you and your provider may decide to screen more often for some diseases, based on your risk and co-morbidities (chronic disease you are already diagnosed with). Preventive services for you include: - Medicare offers their members a free annual wellness visit, which is time for you and your primary care provider to discuss and plan for your preventive service needs. Take advantage of this benefit every year! -All people over age 72 should receive the recommended pneumonia vaccines. Current USPSTF guidelines recommend a series of two vaccines for the best pneumonia protection.  
 
-All adults should have a yearly flu vaccine and a tetanus vaccine every 10 years. All adults age 61 years should receive a shingles vaccine once in their lifetime.   
 
-All adults age 38-68 years who are overweight should have a diabetes screening test once every three years.  
 
-Other screening tests & preventive services for persons with diabetes include: an eye exam to screen for diabetic retinopathy, a kidney function test, a foot exam, and stricter control over your cholesterol.  
 
-Cardiovascular screening for adults with routine risk involves an electrocardiogram (ECG) at intervals determined by the provider.  
 
-Colorectal cancer screenings should be done for adults age 54-65 years with normal risk. There are a number of acceptable methods of screening for this type of cancer. Each test has its own benefits and drawbacks. Discuss with your provider what is most appropriate for you during your annual wellness visit. The different tests include: colonoscopy (considered the best screening method), a fecal occult blood test, a fecal DNA test, and sigmoidoscopy. 
 
-All adults born between [facility] should be screened once for Hepatitis C. 
 
-An Abdominal Aortic Aneurysm (AAA) Screening is recommended for men age 73-68 who has ever smoked in their lifetime. Here is a list of your current Health Maintenance items (your personalized list of preventive services) with a due date: 
Health Maintenance Due Topic Date Due  Glaucoma Screening   12/09/2017 Introducing [facility] SERVICES! Dear Sunday Graves: Thank you for requesting a Rosalind account. Our records indicate that you already have an active Rosalind account. You can access your account anytime at [email or web address] Did you know that you can access your hospital and ER discharge instructions at any time in Eye-Q? You can also review all of your test results from your hospital stay or ER visit. Additional Information If you have questions, please visit the Frequently Asked Questions section of the Eye-Q website at [email or web address]/"[email or web address]/. Remember, Eye-Q is NOT to be used for urgent needs. For medical emergencies, dial 911. Now available from your iPhone and Android! Please provide this summary of care documentation to your next provider. Your primary care clinician is listed as Lenny Francois. If you have any questions after today's visit, please call [phone].

## 2018-07-23 NOTE — ACP (ADVANCE CARE PLANNING)
Advance Care Planning (ACP) Provider Note - Comprehensive     Date of ACP Conversation: 07/23/18  Persons included in Conversation:  patient  Length of ACP Conversation in minutes:  <16 minutes (Non-Billable)    Authorized Decision Maker (if patient is incapable of making informed decisions): This person is:  Healthcare Agent/Medical Power of  under Advance Directive          General ACP for ALL Patients with Decision Making Capacity:   Importance of advance care planning, including choosing a healthcare agent to communicate patient's healthcare decisions if patient lost the ability to make decisions, such as after a sudden illness or accident  Understanding of the healthcare agent role was assessed and information provided  Exploration of values, goals, and preferences if recovery is not expected, even with continued medical treatment in the event of: Imminent death  Severe, permanent brain injury    Review of Existing Advance Directive:  not availble     For Serious or Chronic Illness:  Understanding of medical condition    Understanding of CPR, goals and expected outcomes, benefits and burdens discussed. Information on CPR success rates provided (e.g. for CPR in hospital, survival to d/c at two weeks is 22%, for chronically ill or elderly/frail survival is less than 3%); Individual asked to communicate understanding of information in his/her own words.   Explored fears and concerns regarding CPR or possible outcomes    Interventions Provided:  Recommended completion of Advance Directive form after review of ACP materials and conversation with prospective healthcare agent   Recommended communicating the plan and making copies for the healthcare agent, personal physician, and others as appropriate (e.g., health system)

## 2018-07-23 NOTE — PROGRESS NOTES
This is the Subsequent Medicare Annual Wellness Exam, performed 12 months or more after the Initial AWV or the last Subsequent AWV    I have reviewed the patient's medical history in detail and updated the computerized patient record. Seeing Dr. 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 Taking omeprazole. On  Omeprazole. Wt Readings from Last 3 Encounters:   07/23/18 198 lb 12.8 oz (90.2 kg)   06/21/18 198 lb 12.8 oz (90.2 kg)   05/21/18 208 lb (94.3 kg)       Hypertension- BP was 90/70s again on 1/2 Avapro, so he stopped it 7 days ago and BP is increasing    Hypertension ROS: taking medications as instructed, no medication side effects noted, no TIA's, no chest pain on exertion, no dyspnea on exertion, no swelling of ankles     reports that he quit smoking about 40 years ago. His smoking use included Cigarettes. He has a 10.00 pack-year smoking history. He has never used smokeless tobacco.    reports that he drinks about 2.5 oz of alcohol per week    BP Readings from Last 2 Encounters:   07/23/18 162/80   06/21/18 110/70       History     Past Medical History:   Diagnosis Date    Abnormal celiac antibody panel 01/2017    Ankle fracture, left 6/29/15    Dr. Kg Alcaraz. and prox fibula. s/p surgery    AR (allergic rhinitis)     Arthritis     ritesh hands    Pool esophagus     repeat endoscopy 5/2018 (low grade). Dr. Barrow Knife Elevated prostate specific antigen (PSA)     Dr. Thom Krueger. Dr. Carlos Lamar peak 14.3, bx 5/2008, 2006    Enlarged prostate     Esophageal motility disorder 01/2017    +hiatal hernia    GERD (gastroesophageal reflux disease)     Hearing loss     hearing test 4/2009- bilat.  HTN (hypertension)     diet treated, stress echo 12/07 nl    Left leg paresthesias     medical left leg. since left leg fracuture 6/2015    Normal cardiac stress test 03/07/2017    Onychomycosis     CLARICE (obstructive sleep apnea) 2003    Dr. Claudetta Saint.  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 Reba Anderson UTI (urinary tract infection) 04/2008      Past Surgical History:   Procedure Laterality Date    COLONOSCOPY  2002    normal except hemorrhoids    HX ANKLE FRACTURE TX Left 6/29/15    Dr. Vernon Rossr    HX COLONOSCOPY  10/3/14    1 hyperplastic polyp. Milka    Andrew HERNIA REPAIR Bilateral 12/19/2014    Dr. Paco Vogt [facility] OTHER SURGICAL      Laparoscopic bilateral inguinal hernia repair with mesh    HX TONSILLECTOMY      HX UROLOGICAL  2005 2008    prostate biopsy x 2- benign    PROSTATE BIOPSIES  2006, 2008    saw Dr. Andrew Choi. Now Dr. Anastasiya Morales     Current Outpatient Prescriptions   Medication Hannahmunir Bella PF, 50 mcg/0.5 mL susr injection 0.5 mL by IntraMUSCular route once for 1 dose. Receive 2nd dose in 2-6 months. For Shingles (Zoster) prevention 0.5 mL 1    irbesartan (AVAPRO) 150 mg tablet Take 1 Tab by mouth daily. Decreased pill strength 7/23/18 30 Tab 5    tamsulosin (FLOMAX) 0.4 mg capsule Take 0.4 mg by mouth daily.  SAW PALMETTO PO Take 1 Tab by mouth two (2) times a day.  omeprazole (PRILOSEC) 20 mg capsule Take 20 mg by mouth two (2) times a day.  0    cholecalciferol (VITAMIN D3) 1,000 unit cap Take 2 Caps by mouth daily. 60 Cap 5    cpap machine kit by Does Not Apply route.        No Known Allergies  Family History   Problem Relation Age of Onset   [place] Cancer Mother      leukemia    Celiac Disease Mother     Diabetes Father      age 58    Hypertension Father     Heart Disease Father     Celiac Disease Daughter     Heart Disease Maternal Grandmother      Social History   Substance Use Topics    Smoking status: Former Smoker     Packs/day: 1.00     Years: 10.00     Types: Cigarettes     Quit date: 1/1/1978    Smokeless tobacco: Never Used    Alcohol use 2.5 oz/week     1 Glasses of wine, 1 Cans of beer, 3 Standard drinks or equivalent per week      Comment: occassionally     Patient Active Problem List   Diagnosis Code    HTN (hypertension) I10    Sinus bradycardia R00.1    AR (allergic rhinitis) J30.9    GERD (gastroesophageal reflux disease) K21.9    Hearing loss H91.90    UTI (urinary tract infection) N39.0    Onychomycosis B35.1    Right inguinal hernia K40.90    Elevated prostate specific antigen (PSA) R97.20    CLARICE (obstructive sleep apnea) G47.33    Bilateral inguinal hernia K40.20    Ankle fracture, left O06.549Y    Advanced care planning/counseling discussion Z71.89    Abnormal celiac antibody panel R89.4    Normal cardiac stress test Z13.6       Depression Risk Factor Screening:     PHQ over the last two weeks 10/18/2017   PHQ Not Done Patient Decline   Little interest or pleasure in doing things -   Feeling down, depressed, irritable, or hopeless -   Total Score PHQ 2 -     Alcohol Risk Factor Screening: You do not drink alcohol or very rarely. Functional Ability and Level of Safety:   Hearing Loss  Hearing is good. Activities of Daily Living  The home contains: no safety equipment. Patient does total self care    Fall Risk  Fall Risk Assessment, last 12 mths 10/18/2017   Able to walk? Yes   Fall in past 12 months? No   Fall with injury? -   Number of falls in past 12 months -   Fall Risk Score -       Abuse Screen  Patient is not abused    Cognitive Screening   Evaluation of Cognitive Function:  Has your family/caregiver stated any concerns about your memory: no  Normal    Patient Care Team   Patient Care Team:  Richard Cota MD as PCP - General  Jaleesa Simons MD as Physician (Sleep Medicine)  Naomi Dale RN as Ambulatory Care Navigator (Internal Medicine)    Assessment/Plan   Education and counseling provided:  Are appropriate based on today's review and evaluation    Diagnoses and all orders for this visit:    1. Medicare annual wellness visit, subsequent    2. Screening for alcoholism    3. Advanced directives, counseling/discussion    4. Screening for depression  -     Depression Screen Annual    5. Essential hypertension- fluctuating. Needs daily med. Resume lower dose again. -     irbesartan (AVAPRO) 150 mg tablet; Take 1 Tab by mouth daily. Decreased pill strength 7/23/18  -     LIPID PANEL  -     METABOLIC PANEL, COMPREHENSIVE  -     CBC W/O DIFF    6. Advanced care planning/counseling discussion    7. Elevated prostate specific antigen (PSA)- f/u urology      Other orders  -     SHINGRIX, PF, 50 mcg/0.5 mL susr injection; 0.5 mL by IntraMUSCular route once for 1 dose. Receive 2nd dose in 2-6 months.   For Shingles (Zoster) prevention        Health Maintenance Due   Topic Date Due    GLAUCOMA SCREENING Q2Y  12/09/2017

## 2018-07-24 ENCOUNTER — TELEPHONE (OUTPATIENT)
Dept: INTERNAL MEDICINE CLINIC | Age: 73
End: 2018-07-24

## 2018-07-24 LAB
ALBUMIN SERPL-MCNC: 3.8 G/DL (ref 3.5–4.8)
ALBUMIN/GLOB SERPL: 1.5 {RATIO} (ref 1.2–2.2)
ALP SERPL-CCNC: 110 IU/L (ref 39–117)
ALT SERPL-CCNC: 21 IU/L (ref 0–44)
AST SERPL-CCNC: 26 IU/L (ref 0–40)
BILIRUB SERPL-MCNC: 0.3 MG/DL (ref 0–1.2)
BUN SERPL-MCNC: 15 MG/DL (ref 8–27)
BUN/CREAT SERPL: 17 (ref 10–24)
CALCIUM SERPL-MCNC: 8.8 MG/DL (ref 8.6–10.2)
CHLORIDE SERPL-SCNC: 106 MMOL/L (ref 96–106)
CHOLEST SERPL-MCNC: 119 MG/DL (ref 100–199)
CO2 SERPL-SCNC: 24 MMOL/L (ref 20–29)
CREAT SERPL-MCNC: 0.88 MG/DL (ref 0.76–1.27)
ERYTHROCYTE [DISTWIDTH] IN BLOOD BY AUTOMATED COUNT: 14.5 % (ref 12.3–15.4)
GLOBULIN SER CALC-MCNC: 2.6 G/DL (ref 1.5–4.5)
GLUCOSE SERPL-MCNC: 92 MG/DL (ref 65–99)
HCT VFR BLD AUTO: 39.9 % (ref 37.5–51)
HDLC SERPL-MCNC: 43 MG/DL
HGB BLD-MCNC: 13.1 G/DL (ref 13–17.7)
INTERPRETATION, 910389: NORMAL
LDLC SERPL CALC-MCNC: 67 MG/DL (ref 0–99)
MCH RBC QN AUTO: 30.5 PG (ref 26.6–33)
MCHC RBC AUTO-ENTMCNC: 32.8 G/DL (ref 31.5–35.7)
MCV RBC AUTO: 93 FL (ref 79–97)
PLATELET # BLD AUTO: 174 X10E3/UL (ref 150–379)
POTASSIUM SERPL-SCNC: 4.2 MMOL/L (ref 3.5–5.2)
PROT SERPL-MCNC: 6.4 G/DL (ref 6–8.5)
RBC # BLD AUTO: 4.3 X10E6/UL (ref 4.14–5.8)
SODIUM SERPL-SCNC: 142 MMOL/L (ref 134–144)
TRIGL SERPL-MCNC: 44 MG/DL (ref 0–149)
VLDLC SERPL CALC-MCNC: 9 MG/DL (ref 5–40)
WBC # BLD AUTO: 4.9 X10E3/UL (ref 3.4–10.8)

## 2018-07-24 NOTE — TELEPHONE ENCOUNTER
----- Message from Nini Jimenez sent at 7/24/2018  2:43 PM EDT -----  Regarding:  [facility] / Telephone   Pt called to let  [facility] know Mr. Faby Mayorga completed the screening test for glaucoma on January with Dr. Elmira Del Rosario. If more information is needed, best contact number is [phone].

## 2018-10-02 ENCOUNTER — ANESTHESIA (OUTPATIENT)
Dept: ENDOSCOPY | Age: 73
End: 2018-10-02
Payer: MEDICARE

## 2018-10-02 ENCOUNTER — ANESTHESIA EVENT (OUTPATIENT)
Dept: ENDOSCOPY | Age: 73
End: 2018-10-02
Payer: MEDICARE

## 2018-10-02 ENCOUNTER — HOSPITAL ENCOUNTER (OUTPATIENT)
Age: 73
Setting detail: OUTPATIENT SURGERY
Discharge: HOME | End: 2018-10-02
Attending: INTERNAL MEDICINE | Admitting: INTERNAL MEDICINE
Payer: MEDICARE

## 2018-10-02 VITALS
HEART RATE: 54 BPM | OXYGEN SATURATION: 100 % | TEMPERATURE: 97.9 F | HEIGHT: 67 IN | BODY MASS INDEX: 29.19 KG/M2 | RESPIRATION RATE: 14 BRPM | WEIGHT: 186 LBS | DIASTOLIC BLOOD PRESSURE: 76 MMHG | SYSTOLIC BLOOD PRESSURE: 139 MMHG

## 2018-10-02 PROCEDURE — 76060000031 HC ANESTHESIA FIRST 0.5 HR: Performed by: INTERNAL MEDICINE

## 2018-10-02 PROCEDURE — 74011250636 HC RX REV CODE- 250/636: Performed by: INTERNAL MEDICINE

## 2018-10-02 PROCEDURE — 77030009426 HC FCPS BIOP ENDOSC BSC -B: Performed by: INTERNAL MEDICINE

## 2018-10-02 PROCEDURE — 74011250636 HC RX REV CODE- 250/636

## 2018-10-02 PROCEDURE — 76040000019: Performed by: INTERNAL MEDICINE

## 2018-10-02 PROCEDURE — 88305 TISSUE EXAM BY PATHOLOGIST: CPT | Performed by: INTERNAL MEDICINE

## 2018-10-02 PROCEDURE — 74011000250 HC RX REV CODE- 250

## 2018-10-02 RX ORDER — ATROPINE SULFATE 0.1 MG/ML
0.4 INJECTION INTRAVENOUS
Status: DISCONTINUED | OUTPATIENT
Start: 2018-10-02 | End: 2018-10-02 | Stop reason: HOSPADM

## 2018-10-02 RX ORDER — SODIUM CHLORIDE 9 MG/ML
INJECTION, SOLUTION INTRAVENOUS
Status: DISCONTINUED | OUTPATIENT
Start: 2018-10-02 | End: 2018-10-02 | Stop reason: HOSPADM

## 2018-10-02 RX ORDER — MIDAZOLAM HYDROCHLORIDE 1 MG/ML
.25-5 INJECTION, SOLUTION INTRAMUSCULAR; INTRAVENOUS
Status: DISCONTINUED | OUTPATIENT
Start: 2018-10-02 | End: 2018-10-02 | Stop reason: HOSPADM

## 2018-10-02 RX ORDER — NALOXONE HYDROCHLORIDE 0.4 MG/ML
0.4 INJECTION, SOLUTION INTRAMUSCULAR; INTRAVENOUS; SUBCUTANEOUS
Status: DISCONTINUED | OUTPATIENT
Start: 2018-10-02 | End: 2018-10-02 | Stop reason: HOSPADM

## 2018-10-02 RX ORDER — PROPOFOL 10 MG/ML
INJECTION, EMULSION INTRAVENOUS AS NEEDED
Status: DISCONTINUED | OUTPATIENT
Start: 2018-10-02 | End: 2018-10-02 | Stop reason: HOSPADM

## 2018-10-02 RX ORDER — LIDOCAINE HYDROCHLORIDE 20 MG/ML
INJECTION, SOLUTION EPIDURAL; INFILTRATION; INTRACAUDAL; PERINEURAL AS NEEDED
Status: DISCONTINUED | OUTPATIENT
Start: 2018-10-02 | End: 2018-10-02 | Stop reason: HOSPADM

## 2018-10-02 RX ORDER — GLYCOPYRROLATE 0.2 MG/ML
INJECTION INTRAMUSCULAR; INTRAVENOUS AS NEEDED
Status: DISCONTINUED | OUTPATIENT
Start: 2018-10-02 | End: 2018-10-02 | Stop reason: HOSPADM

## 2018-10-02 RX ORDER — FLUMAZENIL 0.1 MG/ML
0.2 INJECTION INTRAVENOUS
Status: DISCONTINUED | OUTPATIENT
Start: 2018-10-02 | End: 2018-10-02 | Stop reason: HOSPADM

## 2018-10-02 RX ORDER — DEXTROMETHORPHAN/PSEUDOEPHED 2.5-7.5/.8
1.2 DROPS ORAL
Status: DISCONTINUED | OUTPATIENT
Start: 2018-10-02 | End: 2018-10-02 | Stop reason: HOSPADM

## 2018-10-02 RX ORDER — EPINEPHRINE 0.1 MG/ML
1 INJECTION INTRACARDIAC; INTRAVENOUS
Status: DISCONTINUED | OUTPATIENT
Start: 2018-10-02 | End: 2018-10-02 | Stop reason: HOSPADM

## 2018-10-02 RX ORDER — SODIUM CHLORIDE 9 MG/ML
50 INJECTION, SOLUTION INTRAVENOUS CONTINUOUS
Status: DISCONTINUED | OUTPATIENT
Start: 2018-10-02 | End: 2018-10-02 | Stop reason: HOSPADM

## 2018-10-02 RX ADMIN — PROPOFOL 20 MG: 10 INJECTION, EMULSION INTRAVENOUS at 12:22

## 2018-10-02 RX ADMIN — SODIUM CHLORIDE 50 ML/HR: 900 INJECTION, SOLUTION INTRAVENOUS at 11:31

## 2018-10-02 RX ADMIN — PROPOFOL 30 MG: 10 INJECTION, EMULSION INTRAVENOUS at 12:21

## 2018-10-02 RX ADMIN — GLYCOPYRROLATE 0.1 MG: 0.2 INJECTION INTRAMUSCULAR; INTRAVENOUS at 12:15

## 2018-10-02 RX ADMIN — PROPOFOL 80 MG: 10 INJECTION, EMULSION INTRAVENOUS at 12:17

## 2018-10-02 RX ADMIN — PROPOFOL 20 MG: 10 INJECTION, EMULSION INTRAVENOUS at 12:23

## 2018-10-02 RX ADMIN — PROPOFOL 30 MG: 10 INJECTION, EMULSION INTRAVENOUS at 12:27

## 2018-10-02 RX ADMIN — LIDOCAINE HYDROCHLORIDE 60 MG: 20 INJECTION, SOLUTION EPIDURAL; INFILTRATION; INTRACAUDAL; PERINEURAL at 12:15

## 2018-10-02 RX ADMIN — PROPOFOL 30 MG: 10 INJECTION, EMULSION INTRAVENOUS at 12:29

## 2018-10-02 RX ADMIN — SODIUM CHLORIDE: 9 INJECTION, SOLUTION INTRAVENOUS at 12:02

## 2018-10-02 RX ADMIN — PROPOFOL 30 MG: 10 INJECTION, EMULSION INTRAVENOUS at 12:25

## 2018-10-02 RX ADMIN — GLYCOPYRROLATE 0.1 MG: 0.2 INJECTION INTRAMUSCULAR; INTRAVENOUS at 12:24

## 2018-10-02 NOTE — PERIOP NOTES
Patient resting comfortably on stretcher, HOB elevated, VS stable, call bell within reach, patient's wife at bedside, waiting for procedure start, will continue to monitor pt.

## 2018-10-02 NOTE — ANESTHESIA PREPROCEDURE EVALUATION
Anesthetic History No history of anesthetic complications Review of Systems / Medical History Patient summary reviewed, nursing notes reviewed and pertinent labs reviewed Pulmonary Within defined limits Sleep apnea: CPAP Neuro/Psych Within defined limits Cardiovascular Within defined limits Hypertension Dysrhythmias ([place]) Exercise tolerance: >4 METS Comments: Not on beta blocker 3/17 Negative exercise echocardiogram for inducible ischemia at adequate heart  
rate. GI/Hepatic/Renal 
Within defined limits GERD Hiatal hernia Endo/Other Within defined limits Arthritis Other Findings Comments: Left leg paresthesias BARRETTS Physical Exam 
 
Airway Mallampati: II 
TM Distance: 4 - 6 cm Neck ROM: normal range of motion Mouth opening: Diminished (comment) Cardiovascular Regular rate and rhythm,  S1 and S2 normal,  no murmur, click, rub, or gallop Rhythm: regular Rate: normal 
 
 
 
 Dental 
No notable dental hx Pulmonary Breath sounds clear to auscultation Abdominal 
GI exam deferred Other Findings Anesthetic Plan ASA: 2 Anesthesia type: MAC Anesthetic plan and risks discussed with: Patient

## 2018-10-02 NOTE — H&P
[address] Claude Mead M.D. 
[phone] History and Physical    
 
NAME:  Toya Rios :   1945 MRN:   [id number] Consult Date: 10/2/2018 12:11 PM 
 
Chief Complaint:  Pool's esophagus History of Present Illness:  Patient is a 72 y.o. who is seen for Pool's surveillance. Denies any ongoing GI complaints. PMH: 
Past Medical History:  
Diagnosis Date  Abnormal celiac antibody panel 2017  Ankle fracture, left 6/29/15 Dr. Jimbo Garcia. and prox fibula. s/p surgery  AR (allergic rhinitis)  Arthritis   
 ritesh hands  Pool esophagus   
 repeat endoscopy 2018 (low grade). Dr. Steffanie Toney  Elevated prostate specific antigen (PSA) Dr. Albin Rowell. Dr. Tahlia Avila peak 14.3, bx 2008,   Enlarged prostate  Esophageal motility disorder 2017 +hiatal hernia  GERD (gastroesophageal reflux disease)  Hearing loss   
 hearing test 2009- bilat.  HTN (hypertension) diet treated, stress echo  nl  Left leg paresthesias   
 medical left leg. since left leg fracuture 2015  Normal cardiac stress test 2017  Onychomycosis  CLARICE (obstructive sleep apnea)  Dr. Cheryl Mcghee. wears CPAP at night setting 8.5  Panic attacks   
 work-related  Pleurisy  Right inguinal hernia  Sinus bradycardia  Squamous cell carcinoma  left temple Dr. Tianna Alicia  UTI (urinary tract infection) 2008 PSH: 
Past Surgical History:  
Procedure Laterality Date  COLONOSCOPY    
 normal except hemorrhoids  HX ANKLE FRACTURE TX Left 6/29/15 Dr. Jimbo Garcia  HX COLONOSCOPY  10/3/14  
 1 hyperplastic polyp. Steffanie Toney  HX HERNIA REPAIR Bilateral 2014 Dr. Lynnann Canavan  HX OTHER SURGICAL  - Laparoscopic bilateral inguinal hernia repair with mesh  HX TONSILLECTOMY [facility] [place] [facility]   2008  
 prostate biopsy x 2- benign  PROSTATE BIOPSIES  ,  saw Dr. Haja Lucas. Now Dr. Cynthia Castaneda Allergies: 
No Known Allergies Home Medications: 
Prior to Admission Medications Prescriptions Last Dose Informant Patient Reported? Taking? SAW PALMETTO PO 10/1/2018 at am  Yes Yes Sig: Take 1 Tab by mouth two (2) times a day. cholecalciferol (VITAMIN D3) 1,000 unit cap 10/1/2018 at am  No Yes Sig: Take 2 Caps by mouth daily. cpap machine kit 10/1/2018 at pm  Yes Yes Sig: by Does Not Apply route. irbesartan (AVAPRO) 150 mg tablet 10/1/2018 at pm  No Yes Sig: Take 1 Tab by mouth daily. Decreased pill strength 7/23/18  
omeprazole (PRILOSEC) 20 mg capsule 10/2/2018 at am  Yes Yes Sig: Take 20 mg by mouth two (2) times a day. tamsulosin (FLOMAX) 0.4 mg capsule 10/2/2018 at am  Yes Yes Sig: Take 0.4 mg by mouth daily. Facility-Administered Medications: None Hospital Medications: 
Current Facility-Administered Medications Medication Dose Route Frequency  0.9% sodium chloride infusion  50 mL/hr IntraVENous CONTINUOUS  
 midazolam (VERSED) injection 0.25-5 mg  0.25-5 mg IntraVENous Multiple  naloxone (NARCAN) injection 0.4 mg  0.4 mg IntraVENous Multiple  flumazenil (ROMAZICON) 0.1 mg/mL injection 0.2 mg  0.2 mg IntraVENous Multiple  simethicone (MYLICON) 04AO/4.6NF oral drops 80 mg  1.2 mL Oral Multiple  atropine injection 0.4 mg  0.4 mg IntraVENous ONCE PRN  
 EPINEPHrine (ADRENALIN) 0.1 mg/mL syringe 1 mg  1 mg Endoscopically ONCE PRN Social History: 
Social History Substance Use Topics  Smoking status: Former Smoker Packs/day: 1.00 Years: 10.00 Types: Cigarettes Quit date: 1/1/1978  Smokeless tobacco: Never Used  Alcohol use 2.5 oz/week 1 Glasses of wine, 1 Cans of beer, 3 Standard drinks or equivalent per week Comment: occassionally Family History: 
Family History Problem Relation Age of Onset  Cancer Mother   
  leukemia  Celiac Disease Mother  Diabetes Father age 58  Hypertension Father  Heart Disease Father  Celiac Disease Daughter  Heart Disease Maternal Grandmother Review of Systems: 
 
 
Constitutional: negative fever, negative chills, negative weight loss Eyes:   negative visual changes ENT:   negative sore throat, tongue or lip swelling Respiratory:  negative cough, negative dyspnea Cards:  negative for chest pain, palpitations, lower extremity edema GI:   See HPI 
:  negative for frequency, dysuria Integument:  negative for rash and pruritus Heme:  negative for easy bruising and gum/nose bleeding Musculoskel: negative for myalgias,  back pain and muscle weakness Neuro: negative for headaches, dizziness, vertigo Psych:  negative for feelings of anxiety, depression Objective:  
Patient Vitals for the past 8 hrs: 
 BP Temp Pulse Resp SpO2 Height Weight  
10/02/18 1056 134/68 97.9 °F (36.6 °C) (!) 57 18 97 % 5' 7\" (1.702 m) 84.4 kg (186 lb) EXAM:   
 NEURO-a&o HEENT-wnl LUNGS-clear COR-regular rate and rhythym ABD-soft , no tenderness, no rebound, good bs EXT-no edema Data Review No results for input(s): WBC, HGB, HCT, PLT, HGBEXT, HCTEXT, PLTEXT in the last 72 hours. No results for input(s): NA, K, CL, CO2, BUN, CREA, GLU, PHOS, CA in the last 72 hours. No results for input(s): SGOT, GPT, AP, TBIL, TP, ALB, GLOB, GGT, AML, LPSE in the last 72 hours. No lab exists for component: AMYP, HLPSE No results for input(s): INR, PTP, APTT in the last 72 hours. No lab exists for component: INREXT Patient Active Problem List  
Diagnosis Code  
 HTN (hypertension) I10  
 Sinus bradycardia R00.1  AR (allergic rhinitis) J30.9  GERD (gastroesophageal reflux disease) K21.9  Hearing loss H91.90  
 UTI (urinary tract infection) N39.0  Onychomycosis B35.1  Right inguinal hernia K40.90  Elevated prostate specific antigen (PSA) R97.20  CLARICE (obstructive sleep apnea) G47.33  
 Bilateral inguinal hernia K40.20  Ankle fracture, left U41.842O  Advanced care planning/counseling discussion Z71.89  Abnormal celiac antibody panel R89.4  Normal cardiac stress test [id number] Assessment:  
· Pool's esophagus Plan: · EGD today.   
 
Signed By: Molly Delacruz MD   
 10/2/2018  12:11 PM

## 2018-10-02 NOTE — PROCEDURES
Luis Alberto Ball M.D.  [phone]           10/2/2018                EGD Operative Report  Luz Manning  :  1945  [facility] Medical Record Number:  [id number]      Indication:  Pool's/esophageal ulcer     : Jodee Bradley MD    Referring Provider:  Kanu Sales MD      Anesthesia/Sedation:  MAC anesthesia    Airway assessment: No airway problems anticipated    Pre-Procedural Exam:      Airway: clear, no airway problems anticipated  Heart: RRR, without gallops or rubs  Lungs: clear bilaterally without wheezes, crackles, or rhonchi  Abdomen: soft, nontender, nondistended, bowel sounds present  Mental Status: awake, alert and oriented to person, place and time       Procedure Details     After infomed consent was obtained for the procedure, with all risks and benefits of procedure explained the patient was taken to the endoscopy suite and placed in the left lateral decubitus position. Following sequential administration of sedation as per above, the endoscope was inserted into the mouth and advanced under direct vision to second portion of the duodenum. A careful inspection was made as the gastroscope was withdrawn, including a retroflexed view of the proximal stomach; findings and interventions are described below. Findings:   Esophagus:[place] colored mucosa was noted in the lower third of the esophagus from 35 cm up to 29 cm from the gums. No ulcers or nodules or any lesions were seen. Four quadrant biopsies were obtained. Stomach: Small size hiatal hernia, otherwise mucosa within normal.  Duodenum/jejunum: normal    Therapies:  none    Specimens: Four quadrant biopsies obtained at 35 cm, 33 cm, 31 cm, and 29 cm           Complications:   None; patient tolerated the procedure well. EBL:  None. Impression:    Pool's esophagus                           Small size hiatal hernia      Recommendations:    -Continue acid suppression.   -GERD diet: avoid fried and fatty foods.  peppermint, chocolate, alcohol, coffee, citrus fruits and juices, tomoato products; avoid lying down for 2 to 3 hours after eating.  -Will follow-up on pathology    Genella Alpers, MD

## 2018-10-02 NOTE — PERIOP NOTES
Vitals stable, awake and alert. Ready for discharge, just waiting on discharge instructions and to speak with Dr. Francisco Shea.

## 2018-10-02 NOTE — IP AVS SNAPSHOT
[address] [place] [address] 
[phone] Patient: Eli Gaytan MRN: [id number] About your hospitalization You were admitted on:  2018 You last received care in the:  [facility] ENDOSCOPY You were discharged on:  2018 Why you were hospitalized Your primary diagnosis was:  Not on File Follow-up Information None Discharge Orders None A check angela indicates which time of day the medication should be taken. My Medications CONTINUE taking these medications Instructions Each Dose to Equal  
 Morning Noon Evening Bedtime  
 cholecalciferol 1,000 unit Cap Commonly known as:  VITAMIN D3 Your last dose was: Your next dose is: Take 2 Caps by mouth daily. 2000 Units  
    
   
   
   
  
 cpap machine kit Your last dose was: Your next dose is:    
   
   
 by Does Not Apply route. FLOMAX 0.4 mg capsule Generic drug:  tamsulosin Your last dose was: Your next dose is: Take 0.4 mg by mouth daily. 0.4 mg  
    
   
   
   
  
 irbesartan 150 mg tablet Commonly known as:  AVAPRO Your last dose was: Your next dose is: Take 1 Tab by mouth daily. Decreased pill strength 18  
 150 mg  
    
   
   
   
  
 omeprazole 20 mg capsule Commonly known as:  PRILOSEC Your last dose was: Your next dose is: Take 20 mg by mouth two (2) times a day. 20 mg  
    
   
   
   
  
 SAW PALMETTO PO Your last dose was: Your next dose is: Take 1 Tab by mouth two (2) times a day. 1 Tab Discharge Instructions Maryjane [address] Devon Hurley M.D. 
[phone] 
        
10/2/2018 Eli Show :  1945 [address] [place] [address] Record Number:  [id number] ENDOSCOPY FINDINGS: 
 Your endoscopy showed Pool's esophagus without visible inflammation, small hiatal hernia, otherwise mucosa within normal. Biopsies were obtained. EGD DISCHARGE INSTRUCTIONS DISCOMFORT: 
Sore throat- throat lozenges or warm salt water gargle 
redness at IV site- apply warm compress to area; if redness or soreness persist- contact your physician Gaseous discomfort- walking, belching will help relieve any discomfort You may not operate a vehicle for 12 hours You may not engage in an occupation involving machinery or appliances for rest of today You may not drink alcoholic beverages for at least 12 hours Avoid making any critical decisions for at least 24 hour DIET: 
 You may resume your regular diet. ACTIVITY Spend the remainder of the day resting -  avoid any strenuous activity. Avoid driving or operating machinery. CALL M.D. ANY SIGN OF Increasing pain, nausea, vomiting Abdominal distension (swelling) New increased bleeding (oral or rectal) Fever (chills) Pain in chest area Bloody discharge from nose or mouth Shortness of breath Follow-up Instructions: 
 Call Dr. Saumya Mauricio for any questions or problems. Telephone # [phone] Biopsies were obtained, the results will be available  in  5 to 7 days. We will call you to notify you of these results. Continue same medications for now, will call you with biopsy results. ACO Transitions of Care [facility] offers a voluntary care coordination program to provide high quality service and care to [facility] fee-for-service beneficiaries. Geronimopatriciarichmond Magallanes was designed to help you enhance your health and well-being through the following services: ? Transitions of Care  support for individuals who are transitioning from one care setting to another (example: Hospital to home). ? Chronic and Complex Care Coordination  support for individuals and caregivers of those with serious or chronic illnesses or with more than one chronic (ongoing) condition and those who take a number of different medications. If you meet specific medical criteria, a [facility] [address] may call you directly to coordinate your care with your primary care physician and your other care providers. For questions about the [facility] programs, please, contact your physicians office. For general questions or additional information about Accountable Care Organizations: 
Please visit www.medicare.gov/acos. html or call 1-800-MEDICARE (7-212.242.7952) TTY users should call 6-128.281.7012. Introducing [facility]! Dear Roseanne Suggs: Thank you for requesting a Kybalion account. Our records indicate that you already have an active Kybalion account. You can access your account anytime at [email or web address] Did you know that you can access your hospital and ER discharge instructions at any time in Kybalion? You can also review all of your test results from your hospital stay or ER visit. Additional Information If you have questions, please visit the Frequently Asked Questions section of the Kybalion website at [email or web address] Remember, Kybalion is NOT to be used for urgent needs. For medical emergencies, dial 911. Now available from your iPhone and Android! Introducing Marlon Gamez As a [facility] patient, I wanted to make you aware of our electronic visit tool called Marlon Gamez. [facility] 24/7 allows you to connect within minutes with a medical provider 24 hours a day, seven days a week via a mobile device or tablet or logging into a secure website from your computer. You can access Marlon Gamez from anywhere in the [place]. A virtual visit might be right for you when you have a simple condition and feel like you just dont want to get out of bed, or cant get away from work for an appointment, when your regular [facility] provider is not available (evenings, weekends or holidays), or when youre out of town and need minor care. Electronic visits cost only $49 and if the [facility] provider determines a prescription is needed to treat your condition, one can be electronically transmitted to a nearby pharmacy*. Please take a moment to enroll today if you have not already done so. The enrollment process is free and takes just a few minutes. To enroll, please download the [facility] [facility] bryon to your tablet or phone, or visit [email or web address] to enroll on your computer. And, as an [address] [place] [address] patient with a Sunpreme account, the results of your visits will be scanned into your electronic medical record and your primary care provider will be able to view the scanned results. We urge you to continue to see your regular [facility] provider for your ongoing medical care. And while your primary care provider may not be the one available when you seek a Marlon Bretdelia virtual visit, the peace of mind you get from getting a real diagnosis real time can be priceless. For more information on Marlon Gamez, view our Frequently Asked Questions (FAQs) at [email or web address]. Sincerely, 
 
Lila Gaytan MD 
Chief Medical Officer Go[employer] Marisol Caba *:  certain medications cannot be prescribed via Marlon Gamez Providers Seen During Your Hospitalization Provider Specialty Primary office phone Armando Ball MD Gastroenterology [phone] Your Primary Care Physician (PCP) Primary Care Physician Office Phone Office Fax Clive Doss [phone] You are allergic to the following No active allergies Recent Documentation Height Weight BMI Smoking Status 1.702 m 84.4 kg 29.13 kg/m2 Former Smoker Emergency Contacts Name Discharge Info Relation Home Work Mobile Laurita Nelson DISCHARGE CAREGIVER [3] Spouse [3] [phone] [facility] [place] DISCHARGE CAREGIVER [3] Sister [23] [phone] KARENA Shay  Spouse [3] [phone] Patient Belongings The following personal items are in your possession at time of discharge: 
  Dental Appliances: None  Visual Aid: Glasses Please provide this summary of care documentation to your next provider. Signatures-by signing, you are acknowledging that this After Visit Summary has been reviewed with you and you have received a copy. Patient Signature:  ____________________________________________________________ Date:  ____________________________________________________________  
  
[facility] Provider Signature:  ____________________________________________________________ Date:  ____________________________________________________________

## 2018-10-02 NOTE — ROUTINE PROCESS
Brisa Joshua 1945 
[id number] Situation: 
Verbal report received from: Quintin Barajas RN Procedure: Procedure(s): ESOPHAGOGASTRODUODENOSCOPY (EGD) ESOPHAGOGASTRODUODENAL (EGD) BIOPSY Background: 
 
Preoperative diagnosis: BARRETTS Postoperative diagnosis: barretts esophagus :  Dr. Mike Lorenzana Assistant(s): Endoscopy RN-1: Ama Hanna RN Endoscopy RN-2: Ami Reilly RN Specimens:  
ID Type Source Tests Collected by Time Destination 1 : biopsy esophagus at [address] Y MD Milka 10/2/2018 1226 Pathology 2 : biopsy esophagus at 33cm Preservative   Oscar Jaquez MD 10/2/2018 1226 Pathology 3 : biopsy esophagus at 31cm Preservative   Oscar Jaquez MD 10/2/2018 1226 Pathology 4 : biopsy esophagus at 29cm Preservative   Oscar Jaquez MD 10/2/2018 1226 Pathology H. Pylori  no Assessment: 
Intra-procedure medications Anesthesia gave intra-procedure sedation and medications, see anesthesia flow sheet yes Intravenous fluids: NS@ Namita Lint Vital signs stable Abdominal assessment: round and soft Recommendation: 
Discharge patient per MD order. Family or Friend Permission to share finding with family or friend yes Endoscopy discharge instructions have been reviewed and given to patient and spouse. The patient and spouse verbalized understanding and acceptance of instructions.

## 2018-10-02 NOTE — PERIOP NOTES
Patient tolerated procedure without problems. Abdomen soft and patient arousable and voices no complaints Report received from CRNA, see anesthesia note. Patient transported to endoscopy recovery area and verbal report given to Tanner Sims RN.

## 2018-10-02 NOTE — ANESTHESIA POSTPROCEDURE EVALUATION
Post-Anesthesia Evaluation and Assessment Patient: Erendira Marie MRN: [id number]  SSN: [id number] YOB: 1945  Age: 72 y.o. Sex: male Cardiovascular Function/Vital Signs Visit Vitals  /65  Pulse (!) 56  Temp 36.6 °C (97.9 °F)  Resp 12  Ht 5' 7\" (1.702 m)  Wt 84.4 kg (186 lb)  SpO2 100%  BMI 29.13 kg/m2 Patient is status post MAC anesthesia for Procedure(s): ESOPHAGOGASTRODUODENOSCOPY (EGD) ESOPHAGOGASTRODUODENAL (EGD) BIOPSY. Nausea/Vomiting: None Postoperative hydration reviewed and adequate. Pain: 
Pain Scale 1: Adult Nonverbal Pain Scale (too sleepy to verbalize) (10/02/18 1235) Pain Intensity 1: 0 (10/02/18 1235) Managed Neurological Status: At baseline Mental Status and Level of Consciousness: Arousable Pulmonary Status:  
O2 Device: Nasal cannula (10/02/18 1235) Adequate oxygenation and airway patent Complications related to anesthesia: None Post-anesthesia assessment completed. No concerns Signed By: Yuli Calvo MD   
 October 2, 2018

## 2018-10-02 NOTE — DISCHARGE INSTRUCTIONS
Cristobal Squires M.D.  [phone]           10/2/2018  Eli Show  :  1945  Cherri Sherman Medical Record Number:  [id number]        ENDOSCOPY FINDINGS:   Your endoscopy showed Pool's esophagus without visible inflammation, small hiatal hernia, otherwise mucosa within normal. Biopsies were obtained. EGD DISCHARGE INSTRUCTIONS    DISCOMFORT:  Sore throat- throat lozenges or warm salt water gargle  redness at IV site- apply warm compress to area; if redness or soreness persist- contact your physician  Gaseous discomfort- walking, belching will help relieve any discomfort  You may not operate a vehicle for 12 hours  You may not engage in an occupation involving machinery or appliances for rest of today  You may not drink alcoholic beverages for at least 12 hours  Avoid making any critical decisions for at least 24 hour    DIET:   You may resume your regular diet. ACTIVITY  Spend the remainder of the day resting -  avoid any strenuous activity. Avoid driving or operating machinery. CALL M.D. ANY SIGN OF   Increasing pain, nausea, vomiting  Abdominal distension (swelling)  New increased bleeding (oral or rectal)  Fever (chills)  Pain in chest area  Bloody discharge from nose or mouth  Shortness of breath    Follow-up Instructions:   Call Dr. April Boston for any questions or problems. Telephone # [phone]  Biopsies were obtained, the results will be available  in  5 to 7 days. We will call you to notify you of these results. Continue same medications for now, will call you with biopsy results.

## 2019-01-29 DIAGNOSIS — I10 ESSENTIAL HYPERTENSION: ICD-10-CM

## 2019-01-29 RX ORDER — IRBESARTAN 150 MG/1
TABLET ORAL
Qty: 30 TAB | Refills: 5 | Status: SHIPPED | OUTPATIENT
Start: 2019-01-29 | End: 2019-08-02 | Stop reason: SDUPTHER

## 2019-07-19 ENCOUNTER — OFFICE VISIT (OUTPATIENT)
Dept: INTERNAL MEDICINE CLINIC | Age: 74
End: 2019-07-19

## 2019-07-19 VITALS
HEART RATE: 92 BPM | RESPIRATION RATE: 18 BRPM | HEIGHT: 67 IN | SYSTOLIC BLOOD PRESSURE: 127 MMHG | DIASTOLIC BLOOD PRESSURE: 76 MMHG | OXYGEN SATURATION: 96 % | TEMPERATURE: 98.2 F | BODY MASS INDEX: 32.86 KG/M2 | WEIGHT: 209.38 LBS

## 2019-07-19 DIAGNOSIS — S61.012A LACERATION OF LEFT THUMB WITHOUT FOREIGN BODY WITHOUT DAMAGE TO NAIL, INITIAL ENCOUNTER: Primary | ICD-10-CM

## 2019-07-19 DIAGNOSIS — Z48.02 VISIT FOR SUTURE REMOVAL: ICD-10-CM

## 2019-07-19 NOTE — PROGRESS NOTES
HISTORY OF PRESENT ILLNESS  Anushka Cadena is a 69 y.o. male. HPI  Presents for removal of sutures from left thumb laceration. Reports he accidentally cut himself with utility knife on 7/4 and was seen [facility] where wound was cleaned and sutured. He did not receive tetanus booster as he was up to date with TDAP in 2011. Has been keeping wound clean and applying Neosporin ointment. Denies erythema, drainage, edema, warmth. Patient Active Problem List   Diagnosis Code    HTN (hypertension) I10    Sinus bradycardia R00.1    AR (allergic rhinitis) J30.9    GERD (gastroesophageal reflux disease) K21.9    Hearing loss H91.90    UTI (urinary tract infection) N39.0    Onychomycosis B35.1    Right inguinal hernia K40.90    Elevated prostate specific antigen (PSA) R97.20    CLARICE (obstructive sleep apnea) G47.33    Bilateral inguinal hernia K40.20    Ankle fracture, left G40.503Z    Advanced care planning/counseling discussion Z71.89    Abnormal celiac antibody panel R89.4    Normal cardiac stress test [id number]     Past Surgical History:   Procedure Laterality Date    COLONOSCOPY  2002    normal except hemorrhoids    HX ANKLE FRACTURE TX Left 6/29/15    Dr. Trung Nicole    HX COLONOSCOPY  10/3/14    1 hyperplastic polyp. Milka Morales HERNIA REPAIR Bilateral 12/19/2014    Dr. Olayinka Morales OTHER SURGICAL      Laparoscopic bilateral inguinal hernia repair with mesh    HX TONSILLECTOMY      HX UROLOGICAL  2005 2008    prostate biopsy x 2- benign    PROSTATE BIOPSIES  2006, 2008    saw Dr. Shraddha Styles.   Now Dr. 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 )   Social Needs    Financial resource strain: Not on file    Food insecurity:     Worry: Not on file     Inability: Not on file    Transportation needs:     Medical: Not on file     Non-medical: Not on file   Tobacco Use    Smoking status: Former Smoker     Packs/day: 1.00     Years: 10.00     Pack years: 10.00     Types: Cigarettes     Last attempt to quit: 1978     Years since quittin.5    Smokeless tobacco: Never Used   Substance and Sexual Activity    Alcohol use: Yes     Alcohol/week: 4.2 standard drinks     Types: 1 Glasses of wine, 1 Cans of beer, 3 Standard drinks or equivalent per week     Comment: occassionally    Drug use: No    Sexual activity: Never   Lifestyle    Physical activity:     Days per week: Not on file     Minutes per session: Not on file    Stress: Not on file   Relationships    Social connections:     Talks on phone: Not on file     Gets together: Not on file     Attends Spiritism service: Not on file     Active member of club or organization: Not on file     Attends meetings of clubs or organizations: Not on file     Relationship status: Not on file    Intimate partner violence:     Fear of current or ex partner: Not on file     Emotionally abused: Not on file     Physically abused: Not on file     Forced sexual activity: Not on file   Other Topics Concern    Not on file   Social History Narrative    Not on file     Family History   Problem Relation Age of Onset    Cancer Mother         leukemia    Celiac Disease Mother     Diabetes Father         age 58    Hypertension Father     Heart Disease Father     Celiac Disease Daughter     Heart Disease Maternal Grandmother      Current Outpatient Medications   Medication Sig    irbesartan (AVAPRO) 150 mg tablet take 1 tablet by mouth once daily DECREASED PILL STRENGTH 18    SAW PALMETTO PO Take 1 Tab by mouth two (2) times a day.  omeprazole (PRILOSEC) 20 mg capsule Take 20 mg by mouth two (2) times a day.  cholecalciferol (VITAMIN D3) 1,000 unit cap Take 2 Caps by mouth daily.     cpap machine kit by Does Not Apply route.  tamsulosin (FLOMAX) 0.4 mg capsule Take 0.4 mg by mouth daily. No current facility-administered medications for this visit. No Known Allergies  Immunization History   Administered Date(s) Administered    (RETIRED) Pneumococcal Vaccine (Unspecified Type) 12/03/2012    Pneumococcal Conjugate (PCV-13) 01/26/2017    TD Vaccine 09/22/2002    TDAP Vaccine 11/21/2011    Zoster Vaccine, Live 03/01/2014       Review of Systems   Constitutional: Negative for chills and fever. Respiratory: Negative for cough. Cardiovascular: Negative for chest pain. Gastrointestinal: Negative for nausea and vomiting. Musculoskeletal: Negative for myalgias. Skin: Negative for rash. Neurological: Negative for headaches. /76 (BP 1 Location: Left arm, BP Patient Position: Sitting)   Pulse 92   Temp 98.2 °F (36.8 °C) (Oral)   Resp 18   Ht 5' 7\" (1.702 m)   Wt 209 lb 6 oz (95 kg)   SpO2 96%   BMI 32.79 kg/m²   Physical Exam   Constitutional: He is oriented to person, place, and time. He appears well-developed and well-nourished. HENT:   Head: Normocephalic and atraumatic. Neck: Normal range of motion. Pulmonary/Chest: Effort normal.   Musculoskeletal: Normal range of motion. Hands:  Laceration to left thumb healing well. Neurological: He is alert and oriented to person, place, and time. Psychiatric: He has a normal mood and affect. His behavior is normal.   Nursing note and vitals reviewed. ASSESSMENT and PLAN  Diagnoses and all orders for this visit:    1. Laceration of left thumb without foreign body without damage to nail, initial encounter  4 sutures removed easily; edges well approximated; steri-strips applied and advised to avoid soaking left hand in water for the next week. No signs of infection noted  -     REMOVAL OF SUTURES    2.  Visit for suture removal  -     REMOVAL OF SUTURES      reviewed diet, exercise and weight control  reviewed medications and side effects in detail

## 2019-09-02 DIAGNOSIS — I10 ESSENTIAL HYPERTENSION: ICD-10-CM

## 2019-09-02 RX ORDER — IRBESARTAN 150 MG/1
TABLET ORAL
Qty: 90 TAB | Refills: 0 | Status: SHIPPED | OUTPATIENT
Start: 2019-09-02 | End: 2019-11-27 | Stop reason: SDUPTHER

## 2019-09-19 ENCOUNTER — OFFICE VISIT (OUTPATIENT)
Dept: INTERNAL MEDICINE CLINIC | Age: 74
End: 2019-09-19

## 2019-09-19 ENCOUNTER — HOSPITAL ENCOUNTER (OUTPATIENT)
Dept: LAB | Age: 74
Discharge: HOME | End: 2019-09-19
Payer: MEDICARE

## 2019-09-19 VITALS
DIASTOLIC BLOOD PRESSURE: 82 MMHG | RESPIRATION RATE: 18 BRPM | SYSTOLIC BLOOD PRESSURE: 138 MMHG | HEART RATE: 56 BPM | BODY MASS INDEX: 32.65 KG/M2 | TEMPERATURE: 97.6 F | WEIGHT: 208 LBS | HEIGHT: 67 IN | OXYGEN SATURATION: 95 %

## 2019-09-19 DIAGNOSIS — Z28.21 INFLUENZA VACCINATION DECLINED: ICD-10-CM

## 2019-09-19 DIAGNOSIS — I10 ESSENTIAL HYPERTENSION: ICD-10-CM

## 2019-09-19 DIAGNOSIS — Z23 ENCOUNTER FOR IMMUNIZATION: ICD-10-CM

## 2019-09-19 DIAGNOSIS — Z00.00 MEDICARE ANNUAL WELLNESS VISIT, SUBSEQUENT: Primary | ICD-10-CM

## 2019-09-19 DIAGNOSIS — K21.9 GASTROESOPHAGEAL REFLUX DISEASE WITHOUT ESOPHAGITIS: ICD-10-CM

## 2019-09-19 DIAGNOSIS — R97.20 ELEVATED PROSTATE SPECIFIC ANTIGEN (PSA): ICD-10-CM

## 2019-09-19 PROCEDURE — 85027 COMPLETE CBC AUTOMATED: CPT

## 2019-09-19 PROCEDURE — 36415 COLL VENOUS BLD VENIPUNCTURE: CPT

## 2019-09-19 PROCEDURE — 80061 LIPID PANEL: CPT

## 2019-09-19 PROCEDURE — 80053 COMPREHEN METABOLIC PANEL: CPT

## 2019-09-19 NOTE — ACP (ADVANCE CARE PLANNING)
Advance Care Planning (ACP) Provider Note - Comprehensive     Date of ACP Conversation: 09/19/19  Persons included in Conversation:  patient  Length of ACP Conversation in minutes:  <16 minutes (Non-Billable)    Authorized Decision Maker (if patient is incapable of making informed decisions): This person is:  Healthcare Agent/Medical Power of  under Advance Directive          General ACP for ALL Patients with Decision Making Capacity:   Importance of advance care planning, including choosing a healthcare agent to communicate patient's healthcare decisions if patient lost the ability to make decisions, such as after a sudden illness or accident  Understanding of the healthcare agent role was assessed and information provided  Exploration of values, goals, and preferences if recovery is not expected, even with continued medical treatment in the event of: Imminent death  Severe, permanent brain injury    Review of Existing Advance Directive:  not availble     For Serious or Chronic Illness:  Understanding of medical condition    Understanding of CPR, goals and expected outcomes, benefits and burdens discussed. Information on CPR success rates provided (e.g. for CPR in hospital, survival to d/c at two weeks is 22%, for chronically ill or elderly/frail survival is less than 3%); Individual asked to communicate understanding of information in his/her own words.   Explored fears and concerns regarding CPR or possible outcomes    Interventions Provided:  Recommended completion of Advance Directive form after review of ACP materials and conversation with prospective healthcare agent   Recommended communicating the plan and making copies for the healthcare agent, personal physician, and others as appropriate (e.g., health system)

## 2019-09-19 NOTE — PROGRESS NOTES
This is a Subsequent Medicare Annual Wellness Visit providing Personalized Prevention Plan Services (PPPS) (Performed 12 months after initial AWV and PPPS )    I have reviewed the patient's medical history in detail and updated the computerized patient record. Hypertension  Hypertension ROS: taking medications as instructed, no medication side effects noted, no TIA's, no chest pain on exertion, no dyspnea on exertion, no swelling of ankles     reports that he quit smoking about 41 years ago. His smoking use included cigarettes. He has a 10.00 pack-year smoking history. He has never used smokeless tobacco.    reports that he drinks about 4.2 standard drinks of alcohol per week. BP Readings from Last 2 Encounters:   09/19/19 138/82   07/19/19 127/76       Chronic GERD with Pool's esophagus. He is seeing Dr. Leonora Haley. Repeat endoscopy scheduled for next month. Has had yearly endoscopies. Milka. Taking PPI and is asymptomatic. No dysphagia. Seeing urology for elevated PSA. History of renal cyst with benign biopsy. History     Past Medical History:   Diagnosis Date    Abnormal celiac antibody panel 01/2017    Ankle fracture, left 6/29/15    Dr. Khloe Boo. and prox fibula. s/p surgery    AR (allergic rhinitis)     Arthritis     ritesh hands    Pool esophagus     repeat endoscopy 5/2018 (low grade). Dr. Lisa Harrington Elevated prostate specific antigen (PSA)     Dr. Leatha Baptiste. Dr. Pedro Luis Fulton peak 14.3, bx 5/2008, 2006    Enlarged prostate     Esophageal motility disorder 01/2017    +hiatal hernia    GERD (gastroesophageal reflux disease)     Hearing loss     hearing test 4/2009- bilat.  HTN (hypertension)     diet treated, stress echo 12/07 nl    Kidney cysts     left. s/p biopsy 2018 Dr. Goncalves Fairly Left leg paresthesias     medical left leg. since left leg fracuture 6/2015    Normal cardiac stress test 03/07/2017    Onychomycosis     CLARICE (obstructive sleep apnea) 2003    Dr. Herminio Honeycutt.  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 Rabia Lynch UTI (urinary tract infection) 04/2008       Past Surgical History:   Procedure Laterality Date    COLONOSCOPY  2002    normal except hemorrhoids    HX ANKLE FRACTURE TX Left 6/29/15    Dr. Kaylah Hernandez    HX COLONOSCOPY  10/3/14    1 hyperplastic polyp. Milka    HX ENDOSCOPY  10/02/2018    HX ENDOSCOPY  05/21/2018    HX ENDOSCOPY  05/15/2017    HX ENDOSCOPY  10/02/2014    HX HERNIA REPAIR Bilateral 12/19/2014    Dr. Nuria Michelle OTHER SURGICAL      Laparoscopic bilateral inguinal hernia repair with mesh    HX TONSILLECTOMY      HX UROLOGICAL  2005 2008    prostate biopsy x 2- benign    PROSTATE BIOPSIES  2006, 2008    saw Dr. Leatha Moraes. Now Dr. Taty Alvares       Current Outpatient Medications   Medication Sig    irbesartan (AVAPRO) 150 mg tablet TAKE 1 TABLET BY MOUTH EVERY DAY    tamsulosin (FLOMAX) 0.4 mg capsule Take 0.4 mg by mouth daily.  SAW PALMETTO PO Take 1 Tab by mouth two (2) times a day.  omeprazole (PRILOSEC) 20 mg capsule Take 20 mg by mouth two (2) times a day.  cholecalciferol (VITAMIN D3) 1,000 unit cap Take 2 Caps by mouth daily.  cpap machine kit by Does Not Apply route. No current facility-administered medications for this visit. No Known Allergies    Family History   Problem Relation Age of Onset   Lyman Cancer Mother         leukemia    Celiac Disease Mother     Diabetes Father         age 58    Hypertension Father     Heart Disease Father     Celiac Disease Daughter     Heart Disease Maternal Grandmother         reports that he quit smoking about 41 years ago. His smoking use included cigarettes. He has a 10.00 pack-year smoking history. He has never used smokeless tobacco.   reports that he drinks about 4.2 standard drinks of alcohol per week.       Depression Risk Factor Screening: Alcohol Risk Factor Screening: On any occasion during the past 3 months, have you had more than 3 drinks containing alcohol? No    Do you average more than 14 drinks per week? No      Functional Ability and Level of Safety:     Hearing Loss   mild    Activities of Daily Living   Self-care. Requires assistance with: no ADLs    Fall Risk     Fall Risk Assessment, last 12 mths 9/19/2019   Able to walk? Yes   Fall in past 12 months? No   Fall with injury? -   Number of falls in past 12 months -   Fall Risk Score -         Abuse Screen   Patient is not abused    Review of Systems   A comprehensive review of systems was negative except for that written in the HPI. Physical Examination     Evaluation of Cognitive Function:  Mood/affect:  neutral, happy  Appearance: age appropriate  Family member/caregiver input: none    Blood pressure 138/82, pulse (!) 56, temperature 97.6 °F (36.4 °C), temperature source Oral, resp. rate 18, height 5' 7\" (1.702 m), weight 208 lb (94.3 kg), SpO2 95 %. General appearance: alert, cooperative, no distress, appears stated age  Neck: supple, symmetrical, trachea midline, no adenopathy, thyroid: not enlarged, symmetric, no tenderness/mass/nodules, no carotid bruit and no JVD  Lungs: clear to auscultation bilaterally  Heart: regular rate and rhythm, S1, S2 normal, no murmur, click, rub or gallop  Extremities: extremities normal, atraumatic, no cyanosis or edema    Patient Care Team:  Ana Flynn MD as PCP - Venus Koch MD as Physician (Sleep Medicine)      Advice/Referrals/Counseling   Education and counseling provided. See below for specific orders    Assessment/Plan   Diagnoses and all orders for this visit:    1. Medicare annual wellness visit, subsequent  -     LIPID PANEL  -     METABOLIC PANEL, COMPREHENSIVE  -     CBC W/O DIFF    2. Influenza vaccination declined  Declines any influenza vaccine. May consider Shingrix.   Pneumococcal vaccine given today.    3. Encounter for immunization  -     ADMIN PNEUMOCOCCAL VACCINE  -     PNEUMOCOCCAL POLYSACCHARIDE VACCINE, 23-VALENT, ADULT OR IMMUNOSUPPRESSED PT DOSE,    4. Essential hypertension   Controlled on current regimen. Continue current medications as written in chart.  -     LIPID PANEL  -     METABOLIC PANEL, COMPREHENSIVE  -     CBC W/O DIFF    5. Elevated prostate specific antigen (PSA)  Followed closely by urology. Asymptomatic. 6. Gastroesophageal reflux disease without esophagitis  Pool's esophagus. Asymptomatic. Continue PPI. Follow-up next month for endoscopy as scheduled. Ron Mac Potential medication side effects were discussed with the patient; let me know if any occur.   Return for yearly Annual Wellness Visits

## 2019-09-20 LAB
ALBUMIN SERPL-MCNC: 4 G/DL (ref 3.5–4.8)
ALBUMIN/GLOB SERPL: 1.5 {RATIO} (ref 1.2–2.2)
ALP SERPL-CCNC: 133 IU/L (ref 39–117)
ALT SERPL-CCNC: 22 IU/L (ref 0–44)
AST SERPL-CCNC: 23 IU/L (ref 0–40)
BILIRUB SERPL-MCNC: 0.9 MG/DL (ref 0–1.2)
BUN SERPL-MCNC: 14 MG/DL (ref 8–27)
BUN/CREAT SERPL: 16 (ref 10–24)
CALCIUM SERPL-MCNC: 8.9 MG/DL (ref 8.6–10.2)
CHLORIDE SERPL-SCNC: 105 MMOL/L (ref 96–106)
CHOLEST SERPL-MCNC: 152 MG/DL (ref 100–199)
CO2 SERPL-SCNC: 24 MMOL/L (ref 20–29)
CREAT SERPL-MCNC: 0.87 MG/DL (ref 0.76–1.27)
ERYTHROCYTE [DISTWIDTH] IN BLOOD BY AUTOMATED COUNT: 14.1 % (ref 12.3–15.4)
GLOBULIN SER CALC-MCNC: 2.7 G/DL (ref 1.5–4.5)
GLUCOSE SERPL-MCNC: 85 MG/DL (ref 65–99)
HCT VFR BLD AUTO: 43.3 % (ref 37.5–51)
HDLC SERPL-MCNC: 52 MG/DL
HGB BLD-MCNC: 14.3 G/DL (ref 13–17.7)
INTERPRETATION, 910389: NORMAL
LDLC SERPL CALC-MCNC: 90 MG/DL (ref 0–99)
MCH RBC QN AUTO: 30.7 PG (ref 26.6–33)
MCHC RBC AUTO-ENTMCNC: 33 G/DL (ref 31.5–35.7)
MCV RBC AUTO: 93 FL (ref 79–97)
PLATELET # BLD AUTO: 198 X10E3/UL (ref 150–450)
POTASSIUM SERPL-SCNC: 4.1 MMOL/L (ref 3.5–5.2)
PROT SERPL-MCNC: 6.7 G/DL (ref 6–8.5)
RBC # BLD AUTO: 4.66 X10E6/UL (ref 4.14–5.8)
SODIUM SERPL-SCNC: 141 MMOL/L (ref 134–144)
TRIGL SERPL-MCNC: 50 MG/DL (ref 0–149)
VLDLC SERPL CALC-MCNC: 10 MG/DL (ref 5–40)
WBC # BLD AUTO: 6.2 X10E3/UL (ref 3.4–10.8)

## 2019-10-15 ENCOUNTER — ANESTHESIA (OUTPATIENT)
Dept: ENDOSCOPY | Age: 74
End: 2019-10-15
Payer: MEDICARE

## 2019-10-15 ENCOUNTER — ANESTHESIA EVENT (OUTPATIENT)
Dept: ENDOSCOPY | Age: 74
End: 2019-10-15
Payer: MEDICARE

## 2019-10-15 ENCOUNTER — HOSPITAL ENCOUNTER (OUTPATIENT)
Age: 74
Setting detail: OUTPATIENT SURGERY
Discharge: HOME | End: 2019-10-15
Attending: INTERNAL MEDICINE | Admitting: INTERNAL MEDICINE
Payer: MEDICARE

## 2019-10-15 VITALS
BODY MASS INDEX: 33.08 KG/M2 | TEMPERATURE: 97.8 F | RESPIRATION RATE: 20 BRPM | HEIGHT: 67 IN | HEART RATE: 58 BPM | OXYGEN SATURATION: 96 % | WEIGHT: 210.76 LBS | DIASTOLIC BLOOD PRESSURE: 81 MMHG | SYSTOLIC BLOOD PRESSURE: 136 MMHG

## 2019-10-15 PROCEDURE — 77030021593 HC FCPS BIOP ENDOSC BSC -A: Performed by: INTERNAL MEDICINE

## 2019-10-15 PROCEDURE — 76060000031 HC ANESTHESIA FIRST 0.5 HR: Performed by: INTERNAL MEDICINE

## 2019-10-15 PROCEDURE — 74011000250 HC RX REV CODE- 250: Performed by: NURSE ANESTHETIST, CERTIFIED REGISTERED

## 2019-10-15 PROCEDURE — 88305 TISSUE EXAM BY PATHOLOGIST: CPT

## 2019-10-15 PROCEDURE — 74011250636 HC RX REV CODE- 250/636: Performed by: NURSE ANESTHETIST, CERTIFIED REGISTERED

## 2019-10-15 PROCEDURE — 76040000019: Performed by: INTERNAL MEDICINE

## 2019-10-15 RX ORDER — NALOXONE HYDROCHLORIDE 0.4 MG/ML
0.4 INJECTION, SOLUTION INTRAMUSCULAR; INTRAVENOUS; SUBCUTANEOUS
Status: DISCONTINUED | OUTPATIENT
Start: 2019-10-15 | End: 2019-10-15 | Stop reason: HOSPADM

## 2019-10-15 RX ORDER — DEXTROMETHORPHAN/PSEUDOEPHED 2.5-7.5/.8
1.2 DROPS ORAL
Status: DISCONTINUED | OUTPATIENT
Start: 2019-10-15 | End: 2019-10-15 | Stop reason: HOSPADM

## 2019-10-15 RX ORDER — EPINEPHRINE 0.1 MG/ML
1 INJECTION INTRACARDIAC; INTRAVENOUS
Status: DISCONTINUED | OUTPATIENT
Start: 2019-10-15 | End: 2019-10-15 | Stop reason: HOSPADM

## 2019-10-15 RX ORDER — MIDAZOLAM HYDROCHLORIDE 1 MG/ML
.25-5 INJECTION, SOLUTION INTRAMUSCULAR; INTRAVENOUS
Status: DISCONTINUED | OUTPATIENT
Start: 2019-10-15 | End: 2019-10-15 | Stop reason: HOSPADM

## 2019-10-15 RX ORDER — SODIUM CHLORIDE 9 MG/ML
50 INJECTION, SOLUTION INTRAVENOUS CONTINUOUS
Status: DISCONTINUED | OUTPATIENT
Start: 2019-10-15 | End: 2019-10-15 | Stop reason: HOSPADM

## 2019-10-15 RX ORDER — FLUMAZENIL 0.1 MG/ML
0.2 INJECTION INTRAVENOUS
Status: DISCONTINUED | OUTPATIENT
Start: 2019-10-15 | End: 2019-10-15 | Stop reason: HOSPADM

## 2019-10-15 RX ORDER — ATROPINE SULFATE 0.1 MG/ML
0.4 INJECTION INTRAVENOUS
Status: DISCONTINUED | OUTPATIENT
Start: 2019-10-15 | End: 2019-10-15 | Stop reason: HOSPADM

## 2019-10-15 RX ORDER — LIDOCAINE HYDROCHLORIDE 20 MG/ML
INJECTION, SOLUTION EPIDURAL; INFILTRATION; INTRACAUDAL; PERINEURAL AS NEEDED
Status: DISCONTINUED | OUTPATIENT
Start: 2019-10-15 | End: 2019-10-15 | Stop reason: HOSPADM

## 2019-10-15 RX ORDER — PROPOFOL 10 MG/ML
INJECTION, EMULSION INTRAVENOUS AS NEEDED
Status: DISCONTINUED | OUTPATIENT
Start: 2019-10-15 | End: 2019-10-15 | Stop reason: HOSPADM

## 2019-10-15 RX ADMIN — PROPOFOL 20 MG: 10 INJECTION, EMULSION INTRAVENOUS at 14:42

## 2019-10-15 RX ADMIN — LIDOCAINE HYDROCHLORIDE 20 MG: 20 INJECTION, SOLUTION INTRAVENOUS at 14:40

## 2019-10-15 RX ADMIN — PROPOFOL 50 MG: 10 INJECTION, EMULSION INTRAVENOUS at 14:46

## 2019-10-15 RX ADMIN — PROPOFOL 30 MG: 10 INJECTION, EMULSION INTRAVENOUS at 14:41

## 2019-10-15 RX ADMIN — PROPOFOL 20 MG: 10 INJECTION, EMULSION INTRAVENOUS at 14:48

## 2019-10-15 RX ADMIN — PROPOFOL 50 MG: 10 INJECTION, EMULSION INTRAVENOUS at 14:40

## 2019-10-15 NOTE — ANESTHESIA POSTPROCEDURE EVALUATION
Procedure(s):  ESOPHAGOGASTRODUODENOSCOPY (EGD)  ESOPHAGOGASTRODUODENAL (EGD) BIOPSY. MAC    Anesthesia Post Evaluation      Multimodal analgesia: multimodal analgesia used between 6 hours prior to anesthesia start to PACU discharge  Patient location during evaluation: bedside  Patient participation: complete - patient participated  Level of consciousness: awake  Pain management: adequate  Airway patency: patent  Anesthetic complications: no  Cardiovascular status: acceptable  Respiratory status: acceptable  Hydration status: acceptable        Vitals Value Taken Time   /81 10/15/2019  3:14 PM   Temp 36.6 °C (97.8 °F) 10/15/2019  2:59 PM   Pulse 58 10/15/2019  3:16 PM   Resp 17 10/15/2019  3:16 PM   SpO2 94 % 10/15/2019  3:16 PM   Vitals shown include unvalidated device data.

## 2019-10-15 NOTE — PROCEDURES
Anna White M.D.  [phone]           10/15/2019                EGD Operative Report  Antonio Bradley  :  1945  [facility] Medical Record Number:  [id number]      Indication:  Pool's/esophageal ulcer     : Cheryl Hull MD    Referring Provider:  Jordyn Sawyer MD      Anesthesia/Sedation:  MAC anesthesia    Airway assessment: No airway problems anticipated    Pre-Procedural Exam:      Airway: clear, no airway problems anticipated  Heart: RRR, without gallops or rubs  Lungs: clear bilaterally without wheezes, crackles, or rhonchi  Abdomen: soft, nontender, nondistended, bowel sounds present  Mental Status: awake, alert and oriented to person, place and time       Procedure Details     After infomed consent was obtained for the procedure, with all risks and benefits of procedure explained the patient was taken to the endoscopy suite and placed in the left lateral decubitus position. Following sequential administration of sedation as per above, the endoscope was inserted into the mouth and advanced under direct vision to second portion of the duodenum. A careful inspection was made as the gastroscope was withdrawn, including a retroflexed view of the proximal stomach; findings and interventions are described below. Findings:   Esophagus:Evidence of salmon colored mucosa noted from 29 cm from the gums down to 35 cm from the gums consistent with previously diagnosed Pool's esophagus. No lesions or ulcers were seen. Stomach: Small size hiatal hernia seen, otherwise mucosa within normal  Duodenum/jejunum: normal    Therapies:  none    Specimens: Four quadrant biopsies obtained from the esophagus at 35cm, 33cm, 31cm and 29 cm           Complications:   None; patient tolerated the procedure well. EBL:  None.            Impression:    Hiatal Hernia                            Pool's Esophagus      Recommendations:    -Continue acid suppression.  -Await pathology. -GERD diet: avoid fried and fatty foods. peppermint, chocolate, alcohol, coffee, citrus fruits and juices, tomoato products; avoid lying down for 2 to 3 hours after eating.     Marily Garcia MD

## 2019-10-15 NOTE — DISCHARGE INSTRUCTIONS
So Coates M.D.  [phone]           10/15/2019  Allen Martinez  :  1945  [facility] Medical Record Number:  [id number]        ENDOSCOPY FINDINGS:   Your endoscopy showed stable Pool's esophagus and small size hiatal hernia, biopsies were obtained. EGD DISCHARGE INSTRUCTIONS    DISCOMFORT:  Sore throat- throat lozenges or warm salt water gargle  redness at IV site- apply warm compress to area; if redness or soreness persist- contact your physician  Gaseous discomfort- walking, belching will help relieve any discomfort  You may not operate a vehicle for 12 hours  You may not engage in an occupation involving machinery or appliances for rest of today  You may not drink alcoholic beverages for at least 12 hours  Avoid making any critical decisions for at least 24 hour    DIET:   You may resume your regular diet. ACTIVITY  Spend the remainder of the day resting -  avoid any strenuous activity. Avoid driving or operating machinery. CALL M.D. ANY SIGN OF   Increasing pain, nausea, vomiting  Abdominal distension (swelling)  New increased bleeding (oral or rectal)  Fever (chills)  Pain in chest area  Bloody discharge from nose or mouth  Shortness of breath    Follow-up Instructions:   Call Dr. Kota Rdz for any questions or problems. Telephone # [phone]  Biopsies were obtained, the results will be available  in  5 to 7 days. We will call you to notify you of these results. Continue same medications. Follow up in the office.

## 2019-10-15 NOTE — PERIOP NOTES
Report from Tila Hernandez CRNA, see anesthesia record. ABD remains soft and non-tender post procedure. Pt has no complaints at this time and tolerated the procedure well. Endoscope was pre-cleaned at bedside immediately following procedure by Skamania Mins.

## 2019-10-15 NOTE — ANESTHESIA PREPROCEDURE EVALUATION
Anesthetic History   No history of anesthetic complications            Review of Systems / Medical History  Patient summary reviewed, nursing notes reviewed and pertinent labs reviewed    Pulmonary  Within defined limits      Sleep apnea: CPAP           Neuro/Psych   Within defined limits           Cardiovascular  Within defined limits  Hypertension: well controlled        Dysrhythmias ([place])       Exercise tolerance: >4 METS  Comments: Not on beta blocker    3/17  Negative exercise echocardiogram for inducible ischemia at adequate heart   rate.     GI/Hepatic/Renal  Within defined limits   GERD: well controlled      Hiatal hernia     Endo/Other  Within defined limits      Arthritis     Other Findings   Comments: Left leg paresthesias     BARRETTS           Physical Exam    Airway  Mallampati: II  TM Distance: 4 - 6 cm  Neck ROM: normal range of motion   Mouth opening: Diminished (comment)     Cardiovascular  Regular rate and rhythm,  S1 and S2 normal,  no murmur, click, rub, or gallop  Rhythm: regular  Rate: normal         Dental  No notable dental hx       Pulmonary  Breath sounds clear to auscultation               Abdominal  GI exam deferred       Other Findings            Anesthetic Plan    ASA: 2  Anesthesia type: MAC            Anesthetic plan and risks discussed with: Patient

## 2019-10-15 NOTE — H&P
Mini May M.D.  [phone]            History and Physical       NAME:  Walker Barrera   :   1945   MRN:   [id number]           Consult Date: 10/15/2019 2:39 PM    Chief Complaint:  Pool's surveillance    History of Present Illness:  Patient is a 69 y.o. who is seen for Pool's surveillance, doing well on prilosec with rare reflux symptoms. PMH:  Past Medical History:   Diagnosis Date    Abnormal celiac antibody panel 2017    Ankle fracture, left 6/29/15    Dr. Lashawn Casas. and prox fibula. s/p surgery    AR (allergic rhinitis)     Arthritis     ritesh hands    Pool esophagus     repeat endoscopy 2018 (low grade). Dr. Segundo Goodrich Elevated prostate specific antigen (PSA)     Dr. Edward Trujillo. Dr. Zahra Burkett peak 14.3, bx 2008,     Enlarged prostate     Esophageal motility disorder 2017    +hiatal hernia    GERD (gastroesophageal reflux disease)     Hearing loss     hearing test 2009- bilat.  HTN (hypertension)     diet treated, stress echo  nl    Kidney cysts     left. s/p biopsy  Dr. Rustam Sagastume Left leg paresthesias     medical left leg. since left leg fracuture 2015    Normal cardiac stress test 2017    Onychomycosis     CLARICE (obstructive sleep apnea)     Dr. Mehrdad Guido. wears CPAP at night setting 8.5    Panic attacks     work-related    Pleurisy     Right inguinal hernia     Sinus bradycardia     Squamous cell carcinoma     left temple Dr. Juan Manuel Duffy UTI (urinary tract infection) 2008       PSH:  Past Surgical History:   Procedure Laterality Date    COLONOSCOPY      normal except hemorrhoids    HX ANKLE FRACTURE TX Left 6/29/15    Dr. Lashawn Casas    HX COLONOSCOPY  10/3/14    1 hyperplastic polyp.   Milka    HX ENDOSCOPY  10/02/2018    HX ENDOSCOPY  2018    HX ENDOSCOPY  05/15/2017    HX ENDOSCOPY  10/02/2014    HX HERNIA REPAIR Bilateral 2014    Dr. Mg Villafana OTHER SURGICAL   Laparoscopic bilateral inguinal hernia repair with mesh    HX TONSILLECTOMY      HX UROLOGICAL  2005    prostate biopsy x 2- benign    PROSTATE BIOPSIES  ,     saw Dr. Omari Roman. Now Dr. Dalia Pollack       Allergies:  No Known Allergies    Home Medications:  Prior to Admission Medications   Prescriptions Last Dose Informant Patient Reported? Taking? SAW PALMETTO PO 10/15/2019 at Unknown time  Yes Yes   Sig: Take 1 Tab by mouth two (2) times a day. cpap machine kit 10/14/2019 at Unknown time  Yes Yes   Sig: by Does Not Apply route. irbesartan (AVAPRO) 150 mg tablet 10/14/2019 at pm  No Yes   Sig: TAKE 1 TABLET BY MOUTH EVERY DAY   omeprazole (PRILOSEC) 20 mg capsule 10/15/2019 at Unknown time  Yes Yes   Sig: Take 20 mg by mouth two (2) times a day. tamsulosin (FLOMAX) 0.4 mg capsule 10/15/2019 at Unknown time  Yes Yes   Sig: Take 0.4 mg by mouth daily. Facility-Administered Medications: None       Hospital Medications:  No current facility-administered medications for this encounter.         Social History:  Social History     Tobacco Use    Smoking status: Former Smoker     Packs/day: 1.00     Years: 10.00     Pack years: 10.00     Types: Cigarettes     Last attempt to quit: 1978     Years since quittin.8    Smokeless tobacco: Never Used   Substance Use Topics    Alcohol use: Not Currently     Alcohol/week: 4.2 standard drinks     Types: 1 Glasses of wine, 1 Cans of beer, 3 Standard drinks or equivalent per week       Family History:  Family History   Problem Relation Age of Onset    Cancer Mother         leukemia    Celiac Disease Mother     Diabetes Father         age 58    Hypertension Father     Heart Disease Father     Celiac Disease Daughter     Heart Disease Maternal Grandmother              Review of Systems:      Constitutional: negative fever, negative chills, negative weight loss  Eyes:   negative visual changes  ENT:   negative sore throat, tongue or lip swelling  Respiratory:  negative cough, negative dyspnea  Cards:  negative for chest pain, palpitations, lower extremity edema  GI:   See HPI  :  negative for frequency, dysuria  Integument:  negative for rash and pruritus  Heme:  negative for easy bruising and gum/nose bleeding  Musculoskel: negative for myalgias,  back pain and muscle weakness  Neuro: negative for headaches, dizziness, vertigo  Psych:  negative for feelings of anxiety, depression       Objective:     Patient Vitals for the past 8 hrs:   BP Temp Pulse Resp SpO2 Height Weight   10/15/19 1255 153/73 97.3 °F (36.3 °C) (!) 56 14 95 % 5' 7\" (1.702 m) 95.6 kg (210 lb 12.2 oz)     No intake/output data recorded. No intake/output data recorded. EXAM:     NEURO-a&o   HEENT-wnl   LUNGS-clear    COR-regular rate and rhythym     ABD-soft , no tenderness, no rebound, good bs     EXT-no edema     Data Review     No results for input(s): WBC, HGB, HCT, PLT, HGBEXT, HCTEXT, PLTEXT in the last 72 hours. No results for input(s): NA, K, CL, CO2, BUN, CREA, GLU, PHOS, CA in the last 72 hours. No results for input(s): SGOT, GPT, AP, TBIL, TP, ALB, GLOB, GGT, AML, LPSE in the last 72 hours. No lab exists for component: AMYP, HLPSE  No results for input(s): INR, PTP, APTT, INREXT in the last 72 hours.     Patient Active Problem List   Diagnosis Code    HTN (hypertension) I10    Sinus bradycardia R00.1    AR (allergic rhinitis) J30.9    GERD (gastroesophageal reflux disease) K21.9    Hearing loss H91.90    UTI (urinary tract infection) N39.0    Onychomycosis B35.1    Right inguinal hernia K40.90    Elevated prostate specific antigen (PSA) R97.20    CLARICE (obstructive sleep apnea) G47.33    Bilateral inguinal hernia K40.20    Ankle fracture, left I36.639G    Advanced care planning/counseling discussion Z71.89    Abnormal celiac antibody panel R89.4    Normal cardiac stress test [id number]    Influenza vaccination declined Z28.21      Assessment: · Pool's esophagus   Plan:   · EGD today.      Signed By: Leonid John MD     10/15/2019  2:39 PM

## 2020-05-18 ENCOUNTER — HOSPITAL ENCOUNTER (OUTPATIENT)
Age: 75
Setting detail: OUTPATIENT SURGERY
Discharge: HOME | End: 2020-05-18
Attending: INTERNAL MEDICINE | Admitting: INTERNAL MEDICINE
Payer: MEDICARE

## 2020-05-18 ENCOUNTER — ANESTHESIA (OUTPATIENT)
Dept: ENDOSCOPY | Age: 75
End: 2020-05-18
Payer: MEDICARE

## 2020-05-18 ENCOUNTER — ANESTHESIA EVENT (OUTPATIENT)
Dept: ENDOSCOPY | Age: 75
End: 2020-05-18
Payer: MEDICARE

## 2020-05-18 VITALS
HEART RATE: 58 BPM | DIASTOLIC BLOOD PRESSURE: 65 MMHG | SYSTOLIC BLOOD PRESSURE: 105 MMHG | OXYGEN SATURATION: 96 % | HEIGHT: 67 IN | WEIGHT: 207.23 LBS | TEMPERATURE: 97.7 F | RESPIRATION RATE: 16 BRPM | BODY MASS INDEX: 32.53 KG/M2

## 2020-05-18 PROCEDURE — 74011250636 HC RX REV CODE- 250/636: Performed by: NURSE ANESTHETIST, CERTIFIED REGISTERED

## 2020-05-18 PROCEDURE — 88305 TISSUE EXAM BY PATHOLOGIST: CPT

## 2020-05-18 PROCEDURE — 74011000250 HC RX REV CODE- 250: Performed by: NURSE ANESTHETIST, CERTIFIED REGISTERED

## 2020-05-18 PROCEDURE — 76040000019: Performed by: INTERNAL MEDICINE

## 2020-05-18 PROCEDURE — 76060000031 HC ANESTHESIA FIRST 0.5 HR: Performed by: INTERNAL MEDICINE

## 2020-05-18 PROCEDURE — 77030021593 HC FCPS BIOP ENDOSC BSC -A: Performed by: INTERNAL MEDICINE

## 2020-05-18 RX ORDER — ATROPINE SULFATE 0.1 MG/ML
0.4 INJECTION INTRAVENOUS
Status: DISCONTINUED | OUTPATIENT
Start: 2020-05-18 | End: 2020-05-18 | Stop reason: HOSPADM

## 2020-05-18 RX ORDER — PROPOFOL 10 MG/ML
INJECTION, EMULSION INTRAVENOUS AS NEEDED
Status: DISCONTINUED | OUTPATIENT
Start: 2020-05-18 | End: 2020-05-18 | Stop reason: HOSPADM

## 2020-05-18 RX ORDER — NALOXONE HYDROCHLORIDE 0.4 MG/ML
0.4 INJECTION, SOLUTION INTRAMUSCULAR; INTRAVENOUS; SUBCUTANEOUS
Status: DISCONTINUED | OUTPATIENT
Start: 2020-05-18 | End: 2020-05-18 | Stop reason: HOSPADM

## 2020-05-18 RX ORDER — PROPOFOL 10 MG/ML
INJECTION, EMULSION INTRAVENOUS
Status: DISCONTINUED | OUTPATIENT
Start: 2020-05-18 | End: 2020-05-18 | Stop reason: HOSPADM

## 2020-05-18 RX ORDER — DEXTROMETHORPHAN/PSEUDOEPHED 2.5-7.5/.8
1.2 DROPS ORAL
Status: DISCONTINUED | OUTPATIENT
Start: 2020-05-18 | End: 2020-05-18 | Stop reason: HOSPADM

## 2020-05-18 RX ORDER — EPINEPHRINE 0.1 MG/ML
1 INJECTION INTRACARDIAC; INTRAVENOUS
Status: DISCONTINUED | OUTPATIENT
Start: 2020-05-18 | End: 2020-05-18 | Stop reason: HOSPADM

## 2020-05-18 RX ORDER — FLUMAZENIL 0.1 MG/ML
0.2 INJECTION INTRAVENOUS
Status: DISCONTINUED | OUTPATIENT
Start: 2020-05-18 | End: 2020-05-18 | Stop reason: HOSPADM

## 2020-05-18 RX ORDER — SODIUM CHLORIDE 9 MG/ML
50 INJECTION, SOLUTION INTRAVENOUS CONTINUOUS
Status: DISCONTINUED | OUTPATIENT
Start: 2020-05-18 | End: 2020-05-18 | Stop reason: HOSPADM

## 2020-05-18 RX ORDER — LIDOCAINE HYDROCHLORIDE 20 MG/ML
INJECTION, SOLUTION EPIDURAL; INFILTRATION; INTRACAUDAL; PERINEURAL AS NEEDED
Status: DISCONTINUED | OUTPATIENT
Start: 2020-05-18 | End: 2020-05-18 | Stop reason: HOSPADM

## 2020-05-18 RX ORDER — SODIUM CHLORIDE 9 MG/ML
INJECTION, SOLUTION INTRAVENOUS
Status: DISCONTINUED | OUTPATIENT
Start: 2020-05-18 | End: 2020-05-18 | Stop reason: HOSPADM

## 2020-05-18 RX ORDER — MIDAZOLAM HYDROCHLORIDE 1 MG/ML
.25-5 INJECTION, SOLUTION INTRAMUSCULAR; INTRAVENOUS
Status: DISCONTINUED | OUTPATIENT
Start: 2020-05-18 | End: 2020-05-18 | Stop reason: HOSPADM

## 2020-05-18 RX ADMIN — LIDOCAINE HYDROCHLORIDE 100 MG: 20 INJECTION, SOLUTION INTRAVENOUS at 15:14

## 2020-05-18 RX ADMIN — SODIUM CHLORIDE: 9 INJECTION, SOLUTION INTRAVENOUS at 13:56

## 2020-05-18 RX ADMIN — PROPOFOL 50 MG: 10 INJECTION, EMULSION INTRAVENOUS at 15:14

## 2020-05-18 RX ADMIN — PROPOFOL 120 MCG/KG/MIN: 10 INJECTION, EMULSION INTRAVENOUS at 15:13

## 2020-05-18 RX ADMIN — PROPOFOL 30 MG: 10 INJECTION, EMULSION INTRAVENOUS at 15:15

## 2020-05-18 NOTE — DISCHARGE INSTRUCTIONS
Carrie Albright M.D.  [phone]           2020  Antonio Curtis  :  1945  [address] [place] [address] Record Number:  [id number]        ENDOSCOPY FINDINGS:   Your endoscopy showed unchanged Pool's esophagus and small size hiatal hernia. Biopsies were obtained. EGD DISCHARGE INSTRUCTIONS    DISCOMFORT:  Sore throat- throat lozenges or warm salt water gargle  redness at IV site- apply warm compress to area; if redness or soreness persist- contact your physician  Gaseous discomfort- walking, belching will help relieve any discomfort  You may not operate a vehicle for 12 hours  You may not engage in an occupation involving machinery or appliances for rest of today  You may not drink alcoholic beverages for at least 12 hours  Avoid making any critical decisions for at least 24 hour    DIET:   You may resume your regular diet. ACTIVITY  Spend the remainder of the day resting -  avoid any strenuous activity. Avoid driving or operating machinery. CALL M.D. ANY SIGN OF   Increasing pain, nausea, vomiting  Abdominal distension (swelling)  New increased bleeding (oral or rectal)  Fever (chills)  Pain in chest area  Bloody discharge from nose or mouth  Shortness of breath    Follow-up Instructions:   Call Dr. Lake Mondragon for any questions or problems. Telephone # [phone]  Biopsies were obtained, the results will be available  in  5 to 7 days. We will call you to notify you of these results. Continue same medications.

## 2020-05-18 NOTE — ANESTHESIA POSTPROCEDURE EVALUATION
Procedure(s):  ESOPHAGOGASTRODUODENOSCOPY (EGD)  ESOPHAGOGASTRODUODENAL (EGD) BIOPSY. MAC    Anesthesia Post Evaluation      Multimodal analgesia: multimodal analgesia not used between 6 hours prior to anesthesia start to PACU discharge  Patient location during evaluation: PACU  Patient participation: complete - patient participated  Level of consciousness: awake  Pain management: adequate  Airway patency: patent  Anesthetic complications: no  Cardiovascular status: acceptable, blood pressure returned to baseline and hemodynamically stable  Respiratory status: acceptable  Hydration status: acceptable  Post anesthesia nausea and vomiting:  controlled      Vitals Value Taken Time   /70 5/18/2020  3:51 PM   Temp 36.5 °C (97.7 °F) 5/18/2020  3:34 PM   Pulse 59 5/18/2020  3:52 PM   Resp 18 5/18/2020  3:52 PM   SpO2 96 % 5/18/2020  3:52 PM   Vitals shown include unvalidated device data.

## 2020-05-18 NOTE — H&P
Serene Walker M.D.  [phone]            History and Physical       NAME:  Miriam Simmons   :   1945   MRN:   [id number]           Consult Date: 2020 3:15 PM    Chief Complaint:  Pool's surveillance    History of Present Illness:  Patient is a 70 y.o. who is seen for Pool's surveillance, with most recent biopsies showing indefinite for dysplasia. Denies any ongoing GI complaints. PMH:  Past Medical History:   Diagnosis Date    Abnormal celiac antibody panel 2017    Ankle fracture, left 6/29/15    Dr. Shady Lyons. and prox fibula. s/p surgery    AR (allergic rhinitis)     Arthritis     ritesh hands    Pool esophagus     repeat endoscopy 2018 (low grade). Dr. Angie Carter Elevated prostate specific antigen (PSA)     Dr. Mk Foster. Dr. Jennifer Appiah peak 14.3, bx 2008,     Enlarged prostate     Esophageal motility disorder 2017    +hiatal hernia    GERD (gastroesophageal reflux disease)     Hearing loss     hearing test 2009- bilat.  HTN (hypertension)     diet treated, stress echo  nl    Kidney cysts     left. s/p biopsy  Dr. Rosana Oropeza Left leg paresthesias     medical left leg. since left leg fracuture 2015    Normal cardiac stress test 2017    Onychomycosis     CLARICE (obstructive sleep apnea)     Dr. Rut Arias. wears CPAP at night setting 8.5    Panic attacks     work-related    Pleurisy     Right inguinal hernia     Sinus bradycardia     Squamous cell carcinoma     left temple Dr. Denise Orr UTI (urinary tract infection) 2008       PSH:  Past Surgical History:   Procedure Laterality Date    COLONOSCOPY      normal except hemorrhoids    HX ANKLE FRACTURE TX Left 6/29/15    Dr. Shady Lyons    HX COLONOSCOPY  10/3/14    1 hyperplastic polyp.   [place]    HX ENDOSCOPY  10/02/2018    HX ENDOSCOPY  2018    HX ENDOSCOPY  05/15/2017    HX ENDOSCOPY  10/02/2014    HX HERNIA REPAIR Bilateral 2014 Dr. Ada Dominguez OTHER SURGICAL      Laparoscopic bilateral inguinal hernia repair with mesh    HX TONSILLECTOMY      HX UROLOGICAL  2005    prostate biopsy x 2- benign    PROSTATE BIOPSIES  ,     saw Dr. Rainer Mendoza. Now Dr. Braxton Tom       Allergies:  No Known Allergies    Home Medications:  Prior to Admission Medications   Prescriptions Last Dose Informant Patient Reported? Taking? SAW PALMETTO PO 2020 at Unknown time  Yes Yes   Sig: Take 1 Tab by mouth two (2) times a day. cpap machine kit   Yes Yes   Sig: by Does Not Apply route. irbesartan (AVAPRO) 150 mg tablet 2020 at Unknown time  No Yes   Sig: TAKE 1 TABLET BY MOUTH EVERY DAY   omeprazole (PRILOSEC) 20 mg capsule 2020 at Unknown time  Yes Yes   Sig: Take 20 mg by mouth two (2) times a day. tamsulosin (FLOMAX) 0.4 mg capsule 2020 at Unknown time  Yes Yes   Sig: Take 0.4 mg by mouth daily. Facility-Administered Medications: None       Hospital Medications:  No current facility-administered medications for this encounter.       Facility-Administered Medications Ordered in Other Encounters   Medication Dose Route Frequency    0.9% sodium chloride infusion   IntraVENous CONTINUOUS    propofoL (DIPRIVAN) 10 mg/mL injection   IntraVENous CONTINUOUS       Social History:  Social History     Tobacco Use    Smoking status: Former Smoker     Packs/day: 1.00     Years: 10.00     Pack years: 10.00     Types: Cigarettes     Last attempt to quit: 1978     Years since quittin.4    Smokeless tobacco: Never Used   Substance Use Topics    Alcohol use: Not Currently     Alcohol/week: 4.2 standard drinks     Types: 1 Glasses of wine, 1 Cans of beer, 3 Standard drinks or equivalent per week       Family History:  Family History   Problem Relation Age of Onset    Cancer Mother         leukemia    Celiac Disease Mother     Diabetes Father         age 58    Hypertension Father     Heart Disease Father     Celiac Disease Daughter     Heart Disease Maternal Grandmother              Review of Systems:      Constitutional: negative fever, negative chills, negative weight loss  Eyes:   negative visual changes  ENT:   negative sore throat, tongue or lip swelling  Respiratory:  negative cough, negative dyspnea  Cards:  negative for chest pain, palpitations, lower extremity edema  GI:   See HPI  :  negative for frequency, dysuria  Integument:  negative for rash and pruritus  Heme:  negative for easy bruising and gum/nose bleeding  Musculoskel: negative for myalgias,  back pain and muscle weakness  Neuro: negative for headaches, dizziness, vertigo  Psych:  negative for feelings of anxiety, depression       Objective:     Patient Vitals for the past 8 hrs:   BP Temp Pulse Resp SpO2 Height Weight   05/18/20 1346 161/79 97.8 °F (36.6 °C) 64 18 97 % 5' 7\" (1.702 m) 94 kg (207 lb 3.7 oz)     05/18 0701 - 05/18 1900  In: 150 [I.V.:150]  Out: -   No intake/output data recorded. EXAM:     NEURO-a&o   HEENT-wnl   LUNGS-clear    COR-regular rate and rhythym     ABD-soft , no tenderness, no rebound, good bs     EXT-no edema     Data Review     No results for input(s): WBC, HGB, HCT, PLT, HGBEXT, HCTEXT, PLTEXT in the last 72 hours. No results for input(s): NA, K, CL, CO2, BUN, CREA, GLU, PHOS, CA in the last 72 hours. No results for input(s): SGOT, GPT, AP, TBIL, TP, ALB, GLOB, GGT, AML, LPSE in the last 72 hours. No lab exists for component: AMYP, HLPSE  No results for input(s): INR, PTP, APTT, INREXT in the last 72 hours.     Patient Active Problem List   Diagnosis Code    HTN (hypertension) I10    Sinus bradycardia R00.1    AR (allergic rhinitis) J30.9    GERD (gastroesophageal reflux disease) K21.9    Hearing loss H91.90    UTI (urinary tract infection) N39.0    Onychomycosis B35.1    Right inguinal hernia K40.90    Elevated prostate specific antigen (PSA) R97.20    CLARICE (obstructive sleep apnea) G47.33    Bilateral inguinal hernia K40.20    Ankle fracture, left S82.892A    Advanced care planning/counseling discussion Z71.89    Abnormal celiac antibody panel R89.4    Normal cardiac stress test [id number]    Influenza vaccination declined Z28.21      Assessment:   · Pool's esophagus   Plan:   · EGD today.      Signed By: Cristina Avery MD     5/18/2020  3:15 PM

## 2020-05-18 NOTE — ANESTHESIA PREPROCEDURE EVALUATION
Anesthetic History   No history of anesthetic complications            Review of Systems / Medical History  Patient summary reviewed, nursing notes reviewed and pertinent labs reviewed    Pulmonary  Within defined limits      Sleep apnea: CPAP           Neuro/Psych   Within defined limits           Cardiovascular  Within defined limits  Hypertension: well controlled        Dysrhythmias ([place])       Exercise tolerance: >4 METS  Comments: Not on beta blocker    3/17  Negative exercise echocardiogram for inducible ischemia at adequate heart   rate.     GI/Hepatic/Renal  Within defined limits   GERD: well controlled      Hiatal hernia     Endo/Other  Within defined limits      Arthritis     Other Findings   Comments: Left leg paresthesias     BARRETTS           Physical Exam    Airway  Mallampati: II  TM Distance: 4 - 6 cm  Neck ROM: normal range of motion   Mouth opening: Normal     Cardiovascular  Regular rate and rhythm,  S1 and S2 normal,  no murmur, click, rub, or gallop  Rhythm: regular  Rate: normal         Dental  No notable dental hx       Pulmonary  Breath sounds clear to auscultation               Abdominal  GI exam deferred       Other Findings            Anesthetic Plan    ASA: 2  Anesthesia type: MAC            Anesthetic plan and risks discussed with: Patient

## 2020-05-18 NOTE — PROGRESS NOTES

## 2020-05-18 NOTE — PROGRESS NOTES
Humphrey Graf  1945  [id number]    Situation:  Verbal report received from: Cristy Hester RN   Procedure: Procedure(s):  ESOPHAGOGASTRODUODENOSCOPY (EGD)  ESOPHAGOGASTRODUODENAL (EGD) BIOPSY    Background:    Preoperative diagnosis: small's  Postoperative diagnosis: 1.- Hiatal hernia  2.- Barretts esophagus    :  Dr. Lauren Sawyer  Assistant(s): Endoscopy RN-1: Osvaldo Guillen  Endoscopy RN-2: Liset Meneses RN    Specimens: * No specimens in log *  H. Pylori  no    Assessment:  Intra-procedure medications       Anesthesia gave intra-procedure sedation and medications, see anesthesia flow sheet yes    Intravenous fluids: NS@ KVO     Vital signs stable   yes    Abdominal assessment: round and soft   yes    Recommendation:  Discharge patient per MD order  yes.   Return to floor  outpatient  Family or Friend   spouse  Permission to share finding with family or friend yes

## 2020-05-18 NOTE — PROCEDURES
Tiana Tyler M.D.  [phone]           2020                EGD Operative Report  Blanca Bailey  :  1945  [facility] Medical Record Number:  [id number]      Indication:  Pool's/esophageal ulcer     : Homero Munson MD    Referring Provider:  Cintia Parra MD      Anesthesia/Sedation:  MAC anesthesia    Airway assessment: No airway problems anticipated    Pre-Procedural Exam:      Airway: clear, no airway problems anticipated  Heart: RRR, without gallops or rubs  Lungs: clear bilaterally without wheezes, crackles, or rhonchi  Abdomen: soft, nontender, nondistended, bowel sounds present  Mental Status: awake, alert and oriented to person, place and time       Procedure Details     After infomed consent was obtained for the procedure, with all risks and benefits of procedure explained the patient was taken to the endoscopy suite and placed in the left lateral decubitus position. Following sequential administration of sedation as per above, the endoscope was inserted into the mouth and advanced under direct vision to second portion of the duodenum. A careful inspection was made as the gastroscope was withdrawn, including a retroflexed view of the proximal stomach; findings and interventions are described below. Findings:   Esophagus:Evidence of salmon colored mucosa seen starting at 29 cm from the gums down to 35 cm from the gums. No ulcers or lesions seen. Stomach: Small size hiatal hernia noted, otherwise mucosa within normal.  Duodenum/jejunum: normal    Therapies:  none    Specimens: Esophagus biopsies obtained at 35 cm, 33 cm, 31 cm, and 29 cm. Complications:   None; patient tolerated the procedure well. EBL:  None. Impression:    1.- Hiatal hernia  2.- Barretts esophagus      Recommendations:    -Continue acid suppression.  -Await pathology. Continue with strict anti-reflux measures.       Homero Munson MD

## 2020-05-18 NOTE — PROGRESS NOTES
Discharge instructions given at bedside with the patient. Dr. Thanh Hernandez spoke with the patient's spouse in the waiting room.

## 2020-05-24 DIAGNOSIS — I10 ESSENTIAL HYPERTENSION: ICD-10-CM

## 2020-05-25 RX ORDER — IRBESARTAN 150 MG/1
TABLET ORAL
Qty: 90 TAB | Refills: 1 | Status: SHIPPED | OUTPATIENT
Start: 2020-05-25 | End: 2020-11-23 | Stop reason: SDUPTHER

## 2020-06-10 NOTE — PERIOP NOTES
[address]  Endoscopy Preprocedure Instructions      1. On the day of your surgery, please report to registration located on the 2nd floor of the  [facility]. yes    2. You must have a responsible adult to drive you to the hospital, stay at the hospital during your procedure and drive you home. If they leave your procedure will not be started (no exceptions). yes    3. Do not have anything to eat or drink (including water, gum, mints, coffee, and juice) after midnight. This does not apply to the medications you were instructed to take by your physician. yesIf you are currently taking Plavix, Coumadin, Aspirin, or other blood-thinning agents, contact your physician for special instructions. not applicable,    4. If you are having a procedure that requires bowel prep: We recommend that you have only clear liquids the day before your procedure and begin your bowel prep by 5:00 pm.  You may continue to drink clear liquids until midnight. If for any reason you are not able to complete your prep please notify your physician immediately. not applicable    5. Have a list of all current medications, including vitamins, herbal supplements and any other over the counter medications. yes    6. If you wear glasses, contacts, dentures and/or hearing aids, they may be removed prior to procedure, please bring a case to store them in. yes    7. You should understand that if you do not follow these instructions your procedure may be cancelled. If your physical condition changes (I.e. fever, cold or flu) please contact your doctor as soon as possible. 8. It is important that you be on time.   If for any reason you are unable to keep your appointment please call [phone] the day of or your physicians office prior to your scheduled procedure

## 2020-06-12 ENCOUNTER — OFFICE VISIT (OUTPATIENT)
Dept: PRIMARY CARE CLINIC | Age: 75
End: 2020-06-12

## 2020-06-12 DIAGNOSIS — Z01.818 PRE-OP EVALUATION: Primary | ICD-10-CM

## 2020-06-15 LAB — SARS-COV-2, NAA: NOT DETECTED

## 2020-06-17 ENCOUNTER — ANESTHESIA (OUTPATIENT)
Dept: ENDOSCOPY | Age: 75
End: 2020-06-17
Payer: MEDICARE

## 2020-06-17 ENCOUNTER — ANESTHESIA EVENT (OUTPATIENT)
Dept: ENDOSCOPY | Age: 75
End: 2020-06-17
Payer: MEDICARE

## 2020-06-17 ENCOUNTER — HOSPITAL ENCOUNTER (OUTPATIENT)
Age: 75
Setting detail: OUTPATIENT SURGERY
Discharge: HOME | End: 2020-06-17
Attending: INTERNAL MEDICINE | Admitting: INTERNAL MEDICINE
Payer: MEDICARE

## 2020-06-17 VITALS
HEIGHT: 67 IN | SYSTOLIC BLOOD PRESSURE: 154 MMHG | DIASTOLIC BLOOD PRESSURE: 86 MMHG | TEMPERATURE: 97.9 F | HEART RATE: 60 BPM | BODY MASS INDEX: 30.97 KG/M2 | RESPIRATION RATE: 18 BRPM | WEIGHT: 197.31 LBS | OXYGEN SATURATION: 96 %

## 2020-06-17 DIAGNOSIS — K21.9 GASTROESOPHAGEAL REFLUX DISEASE WITHOUT ESOPHAGITIS: Primary | ICD-10-CM

## 2020-06-17 PROCEDURE — 76060000031 HC ANESTHESIA FIRST 0.5 HR: Performed by: INTERNAL MEDICINE

## 2020-06-17 PROCEDURE — C1886 CATHETER, ABLATION: HCPCS | Performed by: INTERNAL MEDICINE

## 2020-06-17 PROCEDURE — 74011000250 HC RX REV CODE- 250: Performed by: NURSE ANESTHETIST, CERTIFIED REGISTERED

## 2020-06-17 PROCEDURE — 74011250636 HC RX REV CODE- 250/636: Performed by: INTERNAL MEDICINE

## 2020-06-17 PROCEDURE — 74011250636 HC RX REV CODE- 250/636: Performed by: NURSE ANESTHETIST, CERTIFIED REGISTERED

## 2020-06-17 PROCEDURE — C1726 CATH, BAL DIL, NON-VASCULAR: HCPCS | Performed by: INTERNAL MEDICINE

## 2020-06-17 PROCEDURE — 76040000019: Performed by: INTERNAL MEDICINE

## 2020-06-17 RX ORDER — ATROPINE SULFATE 0.1 MG/ML
0.5 INJECTION INTRAVENOUS
Status: DISCONTINUED | OUTPATIENT
Start: 2020-06-17 | End: 2020-06-17 | Stop reason: HOSPADM

## 2020-06-17 RX ORDER — NALOXONE HYDROCHLORIDE 0.4 MG/ML
0.4 INJECTION, SOLUTION INTRAMUSCULAR; INTRAVENOUS; SUBCUTANEOUS
Status: DISCONTINUED | OUTPATIENT
Start: 2020-06-17 | End: 2020-06-17 | Stop reason: HOSPADM

## 2020-06-17 RX ORDER — MIDAZOLAM HYDROCHLORIDE 1 MG/ML
.25-5 INJECTION, SOLUTION INTRAMUSCULAR; INTRAVENOUS
Status: DISCONTINUED | OUTPATIENT
Start: 2020-06-17 | End: 2020-06-17 | Stop reason: HOSPADM

## 2020-06-17 RX ORDER — DEXTROMETHORPHAN/PSEUDOEPHED 2.5-7.5/.8
1.2 DROPS ORAL
Status: DISCONTINUED | OUTPATIENT
Start: 2020-06-17 | End: 2020-06-17 | Stop reason: HOSPADM

## 2020-06-17 RX ORDER — SODIUM CHLORIDE 9 MG/ML
50 INJECTION, SOLUTION INTRAVENOUS CONTINUOUS
Status: DISCONTINUED | OUTPATIENT
Start: 2020-06-17 | End: 2020-06-17 | Stop reason: HOSPADM

## 2020-06-17 RX ORDER — SUCRALFATE 1 G/10ML
1 SUSPENSION ORAL 4 TIMES DAILY
Qty: 500 ML | Refills: 1 | Status: SHIPPED | OUTPATIENT
Start: 2020-06-17 | End: 2020-07-01

## 2020-06-17 RX ORDER — GLYCOPYRROLATE 0.2 MG/ML
INJECTION INTRAMUSCULAR; INTRAVENOUS AS NEEDED
Status: DISCONTINUED | OUTPATIENT
Start: 2020-06-17 | End: 2020-06-17 | Stop reason: HOSPADM

## 2020-06-17 RX ORDER — EPINEPHRINE 0.1 MG/ML
1 INJECTION INTRACARDIAC; INTRAVENOUS
Status: DISCONTINUED | OUTPATIENT
Start: 2020-06-17 | End: 2020-06-17 | Stop reason: HOSPADM

## 2020-06-17 RX ORDER — LIDOCAINE HYDROCHLORIDE 20 MG/ML
INJECTION, SOLUTION EPIDURAL; INFILTRATION; INTRACAUDAL; PERINEURAL AS NEEDED
Status: DISCONTINUED | OUTPATIENT
Start: 2020-06-17 | End: 2020-06-17 | Stop reason: HOSPADM

## 2020-06-17 RX ORDER — PROPOFOL 10 MG/ML
INJECTION, EMULSION INTRAVENOUS AS NEEDED
Status: DISCONTINUED | OUTPATIENT
Start: 2020-06-17 | End: 2020-06-17 | Stop reason: HOSPADM

## 2020-06-17 RX ORDER — ACETAMINOPHEN AND CODEINE PHOSPHATE 120; 12 MG/5ML; MG/5ML
5 SOLUTION ORAL
Qty: 500 ML | Refills: 0 | Status: SHIPPED | OUTPATIENT
Start: 2020-06-17 | End: 2020-06-20

## 2020-06-17 RX ORDER — ONDANSETRON 4 MG/1
4 TABLET, ORALLY DISINTEGRATING ORAL
Status: DISCONTINUED | OUTPATIENT
Start: 2020-06-17 | End: 2020-06-17 | Stop reason: HOSPADM

## 2020-06-17 RX ORDER — SUCRALFATE 1 G/1
1 TABLET ORAL 4 TIMES DAILY
Status: DISCONTINUED | OUTPATIENT
Start: 2020-06-17 | End: 2020-06-17 | Stop reason: HOSPADM

## 2020-06-17 RX ORDER — ONDANSETRON 2 MG/ML
4-8 INJECTION INTRAMUSCULAR; INTRAVENOUS ONCE
Status: COMPLETED | OUTPATIENT
Start: 2020-06-17 | End: 2020-06-17

## 2020-06-17 RX ORDER — FLUMAZENIL 0.1 MG/ML
0.2 INJECTION INTRAVENOUS
Status: DISCONTINUED | OUTPATIENT
Start: 2020-06-17 | End: 2020-06-17 | Stop reason: HOSPADM

## 2020-06-17 RX ADMIN — PROPOFOL 30 MG: 10 INJECTION, EMULSION INTRAVENOUS at 08:50

## 2020-06-17 RX ADMIN — PROPOFOL 70 MG: 10 INJECTION, EMULSION INTRAVENOUS at 08:34

## 2020-06-17 RX ADMIN — PROPOFOL 30 MG: 10 INJECTION, EMULSION INTRAVENOUS at 08:39

## 2020-06-17 RX ADMIN — GLYCOPYRROLATE 0.1 MG: 0.2 INJECTION, SOLUTION INTRAMUSCULAR; INTRAVENOUS at 08:36

## 2020-06-17 RX ADMIN — ONDANSETRON 4 MG: 2 INJECTION INTRAMUSCULAR; INTRAVENOUS at 09:33

## 2020-06-17 RX ADMIN — PROPOFOL 30 MG: 10 INJECTION, EMULSION INTRAVENOUS at 08:48

## 2020-06-17 RX ADMIN — PROPOFOL 30 MG: 10 INJECTION, EMULSION INTRAVENOUS at 08:35

## 2020-06-17 RX ADMIN — LIDOCAINE HYDROCHLORIDE 40 MG: 20 INJECTION, SOLUTION INTRAVENOUS at 08:34

## 2020-06-17 RX ADMIN — PROPOFOL 30 MG: 10 INJECTION, EMULSION INTRAVENOUS at 08:45

## 2020-06-17 RX ADMIN — SODIUM CHLORIDE: 9 INJECTION, SOLUTION INTRAVENOUS at 08:29

## 2020-06-17 RX ADMIN — PROPOFOL 30 MG: 10 INJECTION, EMULSION INTRAVENOUS at 08:42

## 2020-06-17 RX ADMIN — PROPOFOL 30 MG: 10 INJECTION, EMULSION INTRAVENOUS at 08:36

## 2020-06-17 RX ADMIN — PROPOFOL 20 MG: 10 INJECTION, EMULSION INTRAVENOUS at 08:54

## 2020-06-17 NOTE — ANESTHESIA POSTPROCEDURE EVALUATION
Procedure(s):  ESOPHAGOGASTRODUODENOSCOPY (EGD)WITH HALO  ENDOSCOPIC HALO ABLATION. MAC    Anesthesia Post Evaluation      Multimodal analgesia: multimodal analgesia not used between 6 hours prior to anesthesia start to PACU discharge  Patient location during evaluation: PACU  Patient participation: complete - patient participated  Level of consciousness: awake and alert  Pain score: 0  Pain management: adequate  Airway patency: patent  Anesthetic complications: no  Cardiovascular status: hemodynamically stable and acceptable  Respiratory status: acceptable  Hydration status: acceptable  Comments: Patient seen and evaluated; no concerns. Post anesthesia nausea and vomiting:  controlled      INITIAL Post-op Vital signs:   Vitals Value Taken Time   /85 6/17/2020  9:21 AM   Temp 36.6 °C (97.9 °F) 6/17/2020  9:09 AM   Pulse 67 6/17/2020  9:26 AM   Resp 17 6/17/2020  9:26 AM   SpO2 96 % 6/17/2020  9:26 AM   Vitals shown include unvalidated device data.

## 2020-06-17 NOTE — PROGRESS NOTES
Anesthesia staff at patient's bedside administering anesthesia and monitoring patients vital signs throughout procedure. See anesthesia note. Endoscope was pre-cleaned at bedside immediately following procedure by chris. ABD remains soft and non-tender post procedure. Pt has no complaints at this time and tolerated the procedure well. Chris Pineda

## 2020-06-17 NOTE — PROCEDURES
[address]  [address] [place] [address], [address] [place] [address]       [phone]                   2020                Esophagoscopy Operative Report  Misael Jurado  :  1945  [address] [place] [address] Medical Record Number:  [id number]      Indication: Vahid's esophagus     : Gen Mcnair MD    Assistants: None    Referring Provider:  Fahad Mayorga MD      Anesthesia/Sedation:  MAC anesthesia Propofol    Airway assessment: No airway problems anticipated    Pre-Procedural Exam:      Airway: clear, no airway problems anticipated  Heart: RRR, without gallops or rubs  Lungs: clear bilaterally without wheezes, crackles, or rhonchi  Abdomen: soft, nontender, nondistended, bowel sounds present  Mental Status: awake, alert and oriented to person, place and time       Procedure Details     After infomed consent was obtained for the procedure, with all risks and benefits of procedure explained the patient was taken to the endoscopy suite and placed in the left lateral decubitus position. Following sequential administration of sedation as per above, the endoscope was inserted into the mouth and advanced under direct vision to second portion of the duodenum. A careful inspection was made as the gastroscope was withdrawn, including a retroflexed view of the proximal stomach; findings and interventions are described below. Findings:   Esophagus: [place] colored mucosa noted on exam. NBI and White light HD detailed exam performed. No nodular or ulcers noted. Area of Pool's esophagus extended from 28 to 36 cm . [place] Classification C8 M0. Area of interest at 29 cm was extensively examined and no nodular areas were seen. The upper endoscope was then withdrawn in anticipation of RFA  tx. Therapies: The endoscope was then re- introduced and the ablation catheter was placed at the tip ( Long HALO device ) Pool's tissue was then targeted . The endoscope  with the ablation catheter was positioned under direct visualization . Ablation catheter was placed  in contact with Pool's tissue in a circumferential fashion for energy delivery and ablation. The scope was then removed and area was cleared and prepared for a second ablation.  A total of 27 ablations of the area were performed at 12 W/cm2 and energy density 12 J/cm. All Pool's tissue was ablated. Post ablation images were satisfactory. No bleeding noted    Specimens: none    Implants: none           Complications:   None; patient tolerated the procedure well. EBL:  None.            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

## 2020-06-17 NOTE — ROUTINE PROCESS
Berta Mahoney  1945  [id number]    Situation:  Verbal report received from: Rose Hdz  Procedure: Procedure(s):  ESOPHAGOGASTRODUODENOSCOPY (EGD)WITH HALO  ENDOSCOPIC HALO ABLATION    Background:    Preoperative diagnosis: BELLO'S ESOPHAGUS  Postoperative diagnosis: Bello's Esophagus    :  Dr. Kevin Victor  Assistant(s): Endoscopy Technician-1: Jaquan Mello  Endoscopy RN-1: Zackary Mcmahan    Specimens: * No specimens in log *  H. Pylori  no    Assessment:  Intra-procedure medications     Anesthesia gave intra-procedure sedation and medications, see anesthesia flow sheet yes    Intravenous fluids: NS@ KVO     Vital signs stable     Abdominal assessment: round and soft     Recommendation:  Discharge patient per MD order.     Family or Friend   Permission to share finding with family or friend yes

## 2020-06-17 NOTE — ANESTHESIA PREPROCEDURE EVALUATION
Anesthetic History   No history of anesthetic complications            Review of Systems / Medical History  Patient summary reviewed and nursing notes reviewed    Pulmonary        Sleep apnea: CPAP           Neuro/Psych         Psychiatric history    Comments: LLE parasthesia  Anxiety/depression Cardiovascular    Hypertension: well controlled            Pertinent negatives: Dysrhythmias: [place]. Exercise tolerance: >4 METS     GI/Hepatic/Renal     GERD: poorly controlled      Hiatal hernia    Comments: Pool's esophagitis  Enlarged prostate Endo/Other        Obesity and arthritis     Other Findings              Physical Exam    Airway  Mallampati: II    Neck ROM: normal range of motion   Mouth opening: Normal     Cardiovascular  Regular rate and rhythm,  S1 and S2 normal,  no murmur, click, rub, or gallop  Rhythm: regular  Rate: normal         Dental  No notable dental hx       Pulmonary  Breath sounds clear to auscultation               Abdominal         Other Findings            Anesthetic Plan    ASA: 2  Anesthesia type: MAC            Anesthetic plan and risks discussed with: Patient      Informed consent obtained.

## 2020-06-17 NOTE — DISCHARGE INSTRUCTIONS
[address] [facility] [address], [address] [facility]  [phone]      Discharge Instructions after Ablation of Pool's Esophagus    You have undergone an upper Endoscopy with ablation of Pool's esophagus. You may experience one or more of the following symptoms after treatment: chest discomfort, sore throat, difficulty or pain when swallowing and /or nausea/vomiting. These symptoms should improve with each day. You will be provided with several medications and specific instructions (below) to make you as comfortable as possible during this time. Should any of your symptoms be more severe in nature or longer in duration then we have described, please contact your physician. It is important to continue a strict and long-term regimen of anti-secretory medication after this treatment, such as Nexium, Prevacid, or other similar medication. ·  Maximize anti-secretory regimen, per MD  Please DO NOT stop taking this medicine. If you are unable to swallow the pill you may crush it if allowed  or contact your physician for a liquid form. ·  Antacid/lidocaine mixture by mouth as needed, per MD    ·  Liquid acetaminophen (Tylenol) with or without codeine by mouth as needed, per  MD    ·  Anti-emetic (anti-nausea) medication per rectum as needed, per MD    ·  Full liquid diet for 24 hours, and then advance to soft diet for 1 week. Avoid extreme cold or hot beverage and food. Avoid aspirin or non-steroidal anti-inflammatory medications (motrin, advil, and ibuprofen)    ·  Contact your treating physician immediately for significant chest pain, difficulty swallowing, fever, bleeding, abdominal pain, difficulty breathing, vomiting or other warning signs provided by the physician, so that the physician may complete the appropriate diagnostic work-up and/or interventions as needed to avoid complications.     ·  If you seek care for a digestive issue from any healthcare personnel in the  next 6 months following this procedure, other than the treating physician, the treating physician should be consulted before any treatment isinitiated.

## 2020-06-17 NOTE — H&P
[address] [facility] [address], [address] [facility]  [phone]                     History and Physical     NAME: Susan Price   :  1945   MRN:  [id number]     HPI:   70year old male who presents with a complaint of Small's esophagus. The patient presents consultation at the request of Dr. Prem Oreilly). The onset of the small's esophagus has been chronic and  has been occurring for 2 years in a persistent pattern. The course has been a recurrent and is described as severe .  The small's esophagus is characterized as long-segment Small's esophagus (LSBE>3cm). The patient has been using omeprazole (Prilosec) (BID) and the medication use is  considered effective by patient. There has been no associated heartburn, chest pain, globus sensation, dysphagia, nausea, vomiting, melena, weight loss or anorexia. The patient had an EGD 1 month(s) ago (FLat Small's with surveillance bx showed \"intramucosal ca\" at 29 cm. Non invasive features per pathologist report. No nodules or raised areas noted per endoscopy report). Previous diagnostic tests have not included CT scan or UGI. Note for \"Small's esophagus\": Pt has had LSBEw/o dysplasia for 2 yrs. He has been followed by Dr Mary Nova . His stx are controlled. He is an [employer] and denies any family hx of esopahgeal or other GI malignancies. Pt denies any use of NSAID's. He denies active etoh or smoking. He quit etoh 2 yrs ago and smoing in . Rest of the 6 cm BE was non dysplastic. ON previous endosocpy 1 yr ago same area showed indeterminate dysplasia. Past Surgical History:   Procedure Laterality Date    COLONOSCOPY      normal except hemorrhoids    HX ANKLE FRACTURE TX Left 6/29/15    Dr. Marie Murry    HX COLONOSCOPY  10/3/14    1 hyperplastic polyp.   [place]    HX ENDOSCOPY  10/02/2018    HX ENDOSCOPY  2018    HX ENDOSCOPY  05/15/2017    HX ENDOSCOPY  10/02/2014    HX HERNIA REPAIR Bilateral 2014 Dr. Lore Hull [facility] OTHER SURGICAL      Laparoscopic bilateral inguinal hernia repair with mesh    HX TONSILLECTOMY      HX UROLOGICAL  2005    prostate biopsy x 2- benign    PROSTATE BIOPSIES  ,     saw Dr. Walter Willams. Now Dr. Gabby Garvin     Past Medical History:   Diagnosis Date    Abnormal celiac antibody panel 2017    Ankle fracture, left 6/29/15    Dr. Bety Do. and prox fibula. s/p surgery    AR (allergic rhinitis)     Arthritis     ritesh hands    Pool esophagus     repeat endoscopy 2018 (low grade). Dr. Pita Lyles Elevated prostate specific antigen (PSA)     Dr. Gabby Garvin. Dr. Walter Willams peak 14.3, bx 2008,     Enlarged prostate     Esophageal motility disorder 2017    +hiatal hernia    GERD (gastroesophageal reflux disease)     Hearing loss     hearing test 2009- bilat.  HTN (hypertension)     diet treated, stress echo  nl    Kidney cysts     left. s/p biopsy  Dr. Alfred Alarcon Left leg paresthesias     medical left leg. since left leg fracuture 2015    Normal cardiac stress test 2017    Onychomycosis     CLARICE (obstructive sleep apnea)     Dr. Nia Urban.  wears CPAP at night setting 8.5    Panic attacks     work-related    Pleurisy     Right inguinal hernia     Sinus bradycardia     Squamous cell carcinoma     left temple Dr. Jessee Wolfe UTI (urinary tract infection) 2008     Social History     Tobacco Use    Smoking status: Former Smoker     Packs/day: 1.00     Years: 10.00     Pack years: 10.00     Types: Cigarettes     Last attempt to quit: 1978     Years since quittin.4    Smokeless tobacco: Never Used   Substance Use Topics    Alcohol use: Not Currently     Alcohol/week: 4.2 standard drinks     Types: 1 Glasses of wine, 1 Cans of beer, 3 Standard drinks or equivalent per week    Drug use: No     No Known Allergies  Family History   Problem Relation Age of Onset    Cancer Mother         leukemia    Celiac Disease Mother     Diabetes Father         age 58    Hypertension Father     Heart Disease Father     Celiac Disease Daughter     Heart Disease Maternal Grandmother      No current facility-administered medications for this encounter. PHYSICAL EXAM:  General: WD, WN. Alert, cooperative, no acute distress    HEENT: NC, Atraumatic. PERRLA, EOMI. Anicteric sclerae. Lungs:  CTA Bilaterally. No Wheezing/Rhonchi/Rales. Heart:  Regular  rhythm,  No murmur, No Rubs, No Gallops  Abdomen: Soft, Non distended, Non tender.  +Bowel sounds, no HSM  Extremities: No c/c/e  Neurologic:  CN 2-12 gi, Alert and oriented X 3. No acute neurological distress   Psych:   Good insight. Not anxious nor agitated. Assessment:   I have reviewed with the patient +/- family alternatives,benefits and risks for the procedure, as well as potential complications(with emphasis on, but not limited to, bleeding, perforation, cardiovascular/cerebrovascular/pulmonary events, reactions to the medications, infection, risk of missing a lesions/a cancer, and the imponderables including death), alternative options, and patient/family voices understanding.       Plan:   · Endoscopic procedure  · Conscious sedation or MAC

## 2020-07-14 ENCOUNTER — TELEPHONE (OUTPATIENT)
Dept: INTERNAL MEDICINE CLINIC | Age: 75
End: 2020-07-14

## 2020-07-14 PROBLEM — R91.1 PULMONARY NODULE, RIGHT: Status: ACTIVE | Noted: 2020-07-14

## 2020-07-14 NOTE — TELEPHONE ENCOUNTER
I spoke with pt, he had a CT scan of his kidneys last week with Dr. Hanh Aldridge at [facility]. They noticed a 4mm left noudle on his lung and was advise to f/up with pcp. [facility], [facility] will [facility] report.

## 2020-07-14 NOTE — TELEPHONE ENCOUNTER
Patient is requesting to speak with the nurse prior to scheduling an apt to discuss a spot found on his lung, he can be reached at [phone]

## 2020-07-15 NOTE — TELEPHONE ENCOUNTER
I have reviewed the results of his renal CT done at [facility] on July 14, 2020. It shows some stable kidney cysts. It incidentally also shows a new RIGHT lower lobe pulmonary nodule measuring 4 mm. This is a very small pulmonary nodule. Guidelines for a less than 6 mm pulmonary nodule suggest that no follow-up is needed unless patient has a history of significant cancer or is high risk. Mr. Jared Bain does have a history of tobacco use and squamous cell cancer of the skin. Although he is not very high risk, I would still recommend repeating his CT scan in 6-12 months to look at this nodule and to check his entire lungs. Incidentally, I reviewed his chart and see that he had a CT scan in October 2017 which showed a 4 mm LEFT pulmonary nodule. This nodule was not seen on the new CT scan. These nodules are likely benign.

## 2020-07-16 NOTE — TELEPHONE ENCOUNTER
Pt was informed of pcp's message and verbalized understanding. F/up appt schedule with pcp for fasting labs on 08/10/2020, pt was thankful for the help.

## 2020-08-10 ENCOUNTER — HOSPITAL ENCOUNTER (OUTPATIENT)
Dept: LAB | Age: 75
Discharge: HOME | End: 2020-08-10

## 2020-08-10 ENCOUNTER — OFFICE VISIT (OUTPATIENT)
Dept: INTERNAL MEDICINE CLINIC | Age: 75
End: 2020-08-10
Payer: MEDICARE

## 2020-08-10 VITALS
HEART RATE: 52 BPM | TEMPERATURE: 97.6 F | DIASTOLIC BLOOD PRESSURE: 88 MMHG | OXYGEN SATURATION: 99 % | RESPIRATION RATE: 18 BRPM | SYSTOLIC BLOOD PRESSURE: 159 MMHG | HEIGHT: 67 IN | BODY MASS INDEX: 32.49 KG/M2 | WEIGHT: 207 LBS

## 2020-08-10 DIAGNOSIS — I10 ESSENTIAL HYPERTENSION: ICD-10-CM

## 2020-08-10 DIAGNOSIS — I87.2 VENOUS STASIS DERMATITIS OF RIGHT LOWER EXTREMITY: ICD-10-CM

## 2020-08-10 DIAGNOSIS — K22.70 BARRETT'S ESOPHAGUS WITHOUT DYSPLASIA: ICD-10-CM

## 2020-08-10 DIAGNOSIS — R91.1 PULMONARY NODULE, RIGHT: ICD-10-CM

## 2020-08-10 DIAGNOSIS — I10 ESSENTIAL HYPERTENSION: Primary | ICD-10-CM

## 2020-08-10 DIAGNOSIS — N28.1 CYST OF LEFT KIDNEY: ICD-10-CM

## 2020-08-10 DIAGNOSIS — C15.9 ESOPHAGEAL ADENOCARCINOMA (HCC): ICD-10-CM

## 2020-08-10 LAB
ALBUMIN SERPL-MCNC: 3.3 G/DL (ref 3.5–5)
ALBUMIN/GLOB SERPL: 1 {RATIO} (ref 1.1–2.2)
ALP SERPL-CCNC: 143 U/L (ref 45–117)
ALT SERPL-CCNC: 24 U/L (ref 12–78)
ANION GAP SERPL CALC-SCNC: 6 MMOL/L (ref 5–15)
AST SERPL-CCNC: 22 U/L (ref 15–37)
BILIRUB SERPL-MCNC: 1 MG/DL (ref 0.2–1)
BUN SERPL-MCNC: 17 MG/DL (ref 6–20)
BUN/CREAT SERPL: 18 (ref 12–20)
CALCIUM SERPL-MCNC: 8.5 MG/DL (ref 8.5–10.1)
CHLORIDE SERPL-SCNC: 107 MMOL/L (ref 97–108)
CHOLEST SERPL-MCNC: 143 MG/DL
CO2 SERPL-SCNC: 28 MMOL/L (ref 21–32)
CREAT SERPL-MCNC: 0.92 MG/DL (ref 0.7–1.3)
ERYTHROCYTE [DISTWIDTH] IN BLOOD BY AUTOMATED COUNT: 14.1 % (ref 11.5–14.5)
GLOBULIN SER CALC-MCNC: 3.4 G/DL (ref 2–4)
GLUCOSE SERPL-MCNC: 81 MG/DL (ref 65–100)
HCT VFR BLD AUTO: 41.4 % (ref 36.6–50.3)
HDLC SERPL-MCNC: 54 MG/DL
HDLC SERPL: 2.6 {RATIO} (ref 0–5)
HGB BLD-MCNC: 13.2 G/DL (ref 12.1–17)
LDLC SERPL CALC-MCNC: 81.4 MG/DL (ref 0–100)
LIPID PROFILE,FLP: NORMAL
MCH RBC QN AUTO: 30.1 PG (ref 26–34)
MCHC RBC AUTO-ENTMCNC: 31.9 G/DL (ref 30–36.5)
MCV RBC AUTO: 94.3 FL (ref 80–99)
NRBC # BLD: 0 K/UL (ref 0–0.01)
NRBC BLD-RTO: 0 PER 100 WBC
PLATELET # BLD AUTO: 190 K/UL (ref 150–400)
PMV BLD AUTO: 10.9 FL (ref 8.9–12.9)
POTASSIUM SERPL-SCNC: 4.5 MMOL/L (ref 3.5–5.1)
PROT SERPL-MCNC: 6.7 G/DL (ref 6.4–8.2)
RBC # BLD AUTO: 4.39 M/UL (ref 4.1–5.7)
SODIUM SERPL-SCNC: 141 MMOL/L (ref 136–145)
TRIGL SERPL-MCNC: 38 MG/DL (ref ?–150)
VLDLC SERPL CALC-MCNC: 7.6 MG/DL
WBC # BLD AUTO: 6.2 K/UL (ref 4.1–11.1)

## 2020-08-10 PROCEDURE — G8427 DOCREV CUR MEDS BY ELIG CLIN: HCPCS | Performed by: INTERNAL MEDICINE

## 2020-08-10 PROCEDURE — G8754 DIAS BP LESS 90: HCPCS | Performed by: INTERNAL MEDICINE

## 2020-08-10 PROCEDURE — 99214 OFFICE O/P EST MOD 30 MIN: CPT | Performed by: INTERNAL MEDICINE

## 2020-08-10 PROCEDURE — G8432 DEP SCR NOT DOC, RNG: HCPCS | Performed by: INTERNAL MEDICINE

## 2020-08-10 PROCEDURE — G8753 SYS BP > OR = 140: HCPCS | Performed by: INTERNAL MEDICINE

## 2020-08-10 PROCEDURE — G8417 CALC BMI ABV UP PARAM F/U: HCPCS | Performed by: INTERNAL MEDICINE

## 2020-08-10 PROCEDURE — 1101F PT FALLS ASSESS-DOCD LE1/YR: CPT | Performed by: INTERNAL MEDICINE

## 2020-08-10 PROCEDURE — 3017F COLORECTAL CA SCREEN DOC REV: CPT | Performed by: INTERNAL MEDICINE

## 2020-08-10 PROCEDURE — G8536 NO DOC ELDER MAL SCRN: HCPCS | Performed by: INTERNAL MEDICINE

## 2020-08-10 RX ORDER — OMEPRAZOLE 40 MG/1
CAPSULE, DELAYED RELEASE ORAL
COMMUNITY
Start: 2020-07-13

## 2020-08-10 NOTE — PROGRESS NOTES
Darrlel Brooks is a 70 y.o. male    Chief Complaint   Patient presents with    Hypertension    GERD     Had CT scan for kidney and Dr. Dea Sy noticed a spot on pt's right lung. Health Maintenance Due   Topic Date Due    Shingrix Vaccine Age 49> (1 of 2) 01/25/1997    GLAUCOMA SCREENING Q2Y  01/01/2020       Visit Vitals  /88 (BP 1 Location: Left arm, BP Patient Position: Sitting)   Pulse (!) 52   Temp 97.6 °F (36.4 °C) (Oral)   Resp 18   Ht 5' 7\" (1.702 m)   Wt 207 lb (93.9 kg)   SpO2 99%   BMI 32.42 kg/m²       1. Have you been to the ER, urgent care clinic since your last visit? Hospitalized since your last visit? No    2. Have you seen or consulted any other health care providers outside of the [address] [place] [address] since your last visit? Include any pap smears or colon screening.  No

## 2020-08-10 NOTE — PROGRESS NOTES
HISTORY OF PRESENT ILLNESS    Chief Complaint   Patient presents with    Hypertension    GERD       Presents for follow-up    Pool's esophagus. Denies GERD s/s or dysphagia. Seeing Dr. Yany Ruiz. EGD 5/18/20, 6/17/20, and pending 9/2/20. Ablations done. Pathology 5/18/20 showed one spot of intramural adenomacarcinoma, non-invasive. 4 mm RLL pleural based nodule seen onCT 7/14/20 [facility]. new compared to 8/13/18. Right kidney cyst.  CT done. Hypertension  Home BP 98/60s to  160/80s. Hypertension ROS: taking medications as instructed, no medication side effects noted, no TIA's, no chest pain on exertion, no dyspnea on exertion, no swelling of ankles     reports that he quit smoking about 42 years ago. His smoking use included cigarettes and cigars. He has a 10.00 pack-year smoking history. He has never used smokeless tobacco.    reports previous alcohol use of about 4.2 standard drinks of alcohol per week. BP Readings from Last 2 Encounters:   08/10/20 159/88   06/17/20 154/86         Review of Systems   All other systems reviewed and are negative, except as noted in HPI    Past Medical and Surgical History   has a past medical history of Abnormal celiac antibody panel (01/2017), Ankle fracture, left (6/29/15), AR (allergic rhinitis), Arthritis, Pool esophagus, Elevated prostate specific antigen (PSA), Enlarged prostate, Esophageal motility disorder (01/2017), GERD (gastroesophageal reflux disease), Hearing loss, HTN (hypertension), Kidney cysts, Left leg paresthesias, Normal cardiac stress test (03/07/2017), Onychomycosis, CLARICE (obstructive sleep apnea) (2003), Panic attacks (2002), Pleurisy, Pulmonary nodule, right (07/14/2020), Right inguinal hernia, Sinus bradycardia, Squamous cell carcinoma (1989), and UTI (urinary tract infection) (04/2008).  He also has no past medical history of Adverse effect of anesthesia, Diabetes ([facility] [address] Difficult intubation, Malignant hyperthermia due to anesthesia, Nausea & vomiting, or Pseudocholinesterase deficiency. has a past surgical history that includes colonoscopy (2002); prostate biopsies (2006, 2008); hx colonoscopy (10/3/14); hx endoscopy (10/02/2018); hx endoscopy (05/21/2018); hx endoscopy (05/15/2017); hx endoscopy (10/02/2014); hx tonsillectomy; hx other surgical (); hx hernia repair (Bilateral, 12/19/2014); hx urological (2005 2008); and hx ankle fracture tx (Left, 6/29/15). reports that he quit smoking about 42 years ago. His smoking use included cigarettes and cigars. He has a 10.00 pack-year smoking history. He has never used smokeless tobacco. He reports previous alcohol use of about 4.2 standard drinks of alcohol per week. He reports that he does not use drugs. family history includes Cancer in his mother; Celiac Disease in his daughter and mother; Diabetes in his father; Heart Disease in his father and maternal grandmother; Hypertension in his father. Physical Exam   Nursing note and vitals reviewed. Blood pressure 159/88, pulse (!) 52, temperature 97.6 °F (36.4 °C), temperature source Oral, resp. rate 18, height 5' 7\" (1.702 m), weight 207 lb (93.9 kg), SpO2 99 %. Constitutional:  No distress. Eyes: Conjunctivae are normal.   Ears:  Hearing grossly intact  Cardiovascular: Normal rate. regular rhythm, no murmurs or gallops  No edema  Pulmonary/Chest: Effort normal.   CTAB  Musculoskeletal: moves all 4 extremities   Neurological: Alert and oriented to person, place, and time. Skin: No rash noted. Psychiatric: Normal mood and affect. Behavior is normal.     ASSESSMENT and PLAN  Diagnoses and all orders for this visit:    1. Essential hypertension  based on my history, the overall control of this problem borderline controlled. Variable, but low at times. -     LIPID PANEL; Future  -     METABOLIC PANEL, COMPREHENSIVE; Future  -     CBC W/O DIFF; Future    2. Pulmonary nodule, right  Appears benign.   Given distant tobacco use and recent CA dx, will repeat in 12 months. 3. Esophageal adenocarcinoma ([address]  On biopsy. NON-INVASIVE, per reports. Having ablations per GI.      4. 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 Currently asymptomatic    5. Venous stasis dermatitis of right lower extremity  Right medial skin    6. Cyst of left kidney  benign      lab results and schedule of future lab studies reviewed with patient  reviewed medications and side effects in detail    Return to clinic for further evaluation if new symptoms develop        Current Outpatient Medications   Medication Sig    omeprazole (PRILOSEC) 40 mg capsule TK 1 C PO BID    irbesartan (AVAPRO) 150 mg tablet TAKE 1 TABLET BY MOUTH EVERY DAY    tamsulosin (FLOMAX) 0.4 mg capsule Take 0.4 mg by mouth daily.  SAW PALMETTO PO Take 1 Tab by mouth two (2) times a day.  cpap machine kit by Does Not Apply route. No current facility-administered medications for this visit.

## 2020-08-25 NOTE — PROGRESS NOTES
[address] [place] [address] Dr Moreno Preprocedure Instructions      1. On the day of your surgery, please report to registration located on the 2nd floor of the  [facility]. yes    2. You must have a responsible adult to drive you to the hospital, stay at the hospital during your procedure and drive you home. If they leave your procedure will not be started (no exceptions). yes    3. Do not have anything to eat or drink (including water, gum, mints, coffee, and juice) after midnight. This does not apply to the medications you were instructed to take by your physician. yesIf you are currently taking Plavix, Coumadin, Aspirin, or other blood-thinning agents, contact your physician for special instructions. yes,    4. If you are having a procedure that requires bowel prep: We recommend that you have only clear liquids the day before your procedure and begin your bowel prep by 5:00 pm.  You may continue to drink clear liquids until midnight. If for any reason you are not able to complete your prep please notify your physician immediately. not applicable    5. Have a list of all current medications, including vitamins, herbal supplements and any other over the counter medications. yes    6. If you wear glasses, contacts, dentures and/or hearing aids, they may be removed prior to procedure, please bring a case to store them in. yes    7. You should understand that if you do not follow these instructions your procedure may be cancelled. If your physical condition changes (I.e. fever, cold or flu) please contact your doctor as soon as possible. 8. It is important that you be on time.   If for any reason you are unable to keep your appointment please call )- the day of or your physicians office prior to your scheduled procedure    COVID testing 8/29/2020

## 2020-08-29 ENCOUNTER — HOSPITAL ENCOUNTER (OUTPATIENT)
Dept: LAB | Age: 75
Discharge: HOME | End: 2020-08-29
Payer: MEDICARE

## 2020-08-29 DIAGNOSIS — U07.1 COVID-19: ICD-10-CM

## 2020-08-29 PROCEDURE — 87635 SARS-COV-2 COVID-19 AMP PRB: CPT

## 2020-08-30 LAB — SARS-COV-2, COV2NT: NOT DETECTED

## 2020-09-02 ENCOUNTER — ANESTHESIA (OUTPATIENT)
Dept: ENDOSCOPY | Age: 75
End: 2020-09-02
Payer: MEDICARE

## 2020-09-02 ENCOUNTER — APPOINTMENT (OUTPATIENT)
Dept: CT IMAGING | Age: 75
End: 2020-09-02
Attending: EMERGENCY MEDICINE
Payer: MEDICARE

## 2020-09-02 ENCOUNTER — ANESTHESIA EVENT (OUTPATIENT)
Dept: ENDOSCOPY | Age: 75
End: 2020-09-02
Payer: MEDICARE

## 2020-09-02 ENCOUNTER — APPOINTMENT (OUTPATIENT)
Dept: GENERAL RADIOLOGY | Age: 75
End: 2020-09-02
Attending: EMERGENCY MEDICINE
Payer: MEDICARE

## 2020-09-02 ENCOUNTER — HOSPITAL ENCOUNTER (OUTPATIENT)
Age: 75
Setting detail: OUTPATIENT SURGERY
Discharge: HOME | End: 2020-09-02
Attending: INTERNAL MEDICINE | Admitting: INTERNAL MEDICINE
Payer: MEDICARE

## 2020-09-02 ENCOUNTER — HOSPITAL ENCOUNTER (EMERGENCY)
Age: 75
Discharge: HOME | End: 2020-09-03
Attending: EMERGENCY MEDICINE
Payer: MEDICARE

## 2020-09-02 VITALS
DIASTOLIC BLOOD PRESSURE: 70 MMHG | OXYGEN SATURATION: 95 % | RESPIRATION RATE: 16 BRPM | SYSTOLIC BLOOD PRESSURE: 107 MMHG | WEIGHT: 206.79 LBS | BODY MASS INDEX: 32.46 KG/M2 | HEART RATE: 87 BPM | HEIGHT: 67 IN | TEMPERATURE: 99.2 F

## 2020-09-02 VITALS
TEMPERATURE: 97.5 F | WEIGHT: 205.03 LBS | HEART RATE: 76 BPM | OXYGEN SATURATION: 97 % | DIASTOLIC BLOOD PRESSURE: 86 MMHG | RESPIRATION RATE: 21 BRPM | HEIGHT: 67 IN | SYSTOLIC BLOOD PRESSURE: 143 MMHG | BODY MASS INDEX: 32.18 KG/M2

## 2020-09-02 DIAGNOSIS — N40.0 PROSTATE ENLARGEMENT: ICD-10-CM

## 2020-09-02 DIAGNOSIS — K57.30 DIVERTICULOSIS OF COLON WITHOUT HEMORRHAGE: ICD-10-CM

## 2020-09-02 DIAGNOSIS — R50.9 ACUTE FEBRILE ILLNESS: Primary | ICD-10-CM

## 2020-09-02 DIAGNOSIS — R33.9 URINARY RETENTION: ICD-10-CM

## 2020-09-02 LAB
ALBUMIN SERPL-MCNC: 3.2 G/DL (ref 3.5–5)
ALBUMIN/GLOB SERPL: 0.9 {RATIO} (ref 1.1–2.2)
ALP SERPL-CCNC: 135 U/L (ref 45–117)
ALT SERPL-CCNC: 22 U/L (ref 12–78)
ANION GAP SERPL CALC-SCNC: 5 MMOL/L (ref 5–15)
APPEARANCE UR: CLEAR
AST SERPL-CCNC: 20 U/L (ref 15–37)
BACTERIA URNS QL MICRO: NEGATIVE /HPF
BASOPHILS # BLD: 0 K/UL (ref 0–0.1)
BASOPHILS NFR BLD: 0 % (ref 0–1)
BILIRUB SERPL-MCNC: 1.3 MG/DL (ref 0.2–1)
BILIRUB UR QL: NEGATIVE
BUN SERPL-MCNC: 14 MG/DL (ref 6–20)
BUN/CREAT SERPL: 15 (ref 12–20)
CALCIUM SERPL-MCNC: 8 MG/DL (ref 8.5–10.1)
CHLORIDE SERPL-SCNC: 105 MMOL/L (ref 97–108)
CO2 SERPL-SCNC: 26 MMOL/L (ref 21–32)
COLOR UR: ABNORMAL
COMMENT, HOLDF: NORMAL
CREAT SERPL-MCNC: 0.92 MG/DL (ref 0.7–1.3)
CRP SERPL-MCNC: 0.66 MG/DL (ref 0–0.6)
DIFFERENTIAL METHOD BLD: ABNORMAL
EOSINOPHIL # BLD: 0 K/UL (ref 0–0.4)
EOSINOPHIL NFR BLD: 0 % (ref 0–7)
EPITH CASTS URNS QL MICRO: ABNORMAL /LPF
ERYTHROCYTE [DISTWIDTH] IN BLOOD BY AUTOMATED COUNT: 13.2 % (ref 11.5–14.5)
GLOBULIN SER CALC-MCNC: 3.4 G/DL (ref 2–4)
GLUCOSE SERPL-MCNC: 102 MG/DL (ref 65–100)
GLUCOSE UR STRIP.AUTO-MCNC: NEGATIVE MG/DL
HCT VFR BLD AUTO: 40.8 % (ref 36.6–50.3)
HGB BLD-MCNC: 13.8 G/DL (ref 12.1–17)
HGB UR QL STRIP: ABNORMAL
HYALINE CASTS URNS QL MICRO: ABNORMAL /LPF (ref 0–5)
IMM GRANULOCYTES # BLD AUTO: 0 K/UL (ref 0–0.04)
IMM GRANULOCYTES NFR BLD AUTO: 0 % (ref 0–0.5)
KETONES UR QL STRIP.AUTO: ABNORMAL MG/DL
LACTATE SERPL-SCNC: 1.1 MMOL/L (ref 0.4–2)
LEUKOCYTE ESTERASE UR QL STRIP.AUTO: NEGATIVE
LYMPHOCYTES # BLD: 0.5 K/UL (ref 0.8–3.5)
LYMPHOCYTES NFR BLD: 4 % (ref 12–49)
MCH RBC QN AUTO: 30.9 PG (ref 26–34)
MCHC RBC AUTO-ENTMCNC: 33.8 G/DL (ref 30–36.5)
MCV RBC AUTO: 91.3 FL (ref 80–99)
MONOCYTES # BLD: 0.8 K/UL (ref 0–1)
MONOCYTES NFR BLD: 6 % (ref 5–13)
NEUTS SEG # BLD: 12.1 K/UL (ref 1.8–8)
NEUTS SEG NFR BLD: 90 % (ref 32–75)
NITRITE UR QL STRIP.AUTO: NEGATIVE
NRBC # BLD: 0 K/UL (ref 0–0.01)
NRBC BLD-RTO: 0 PER 100 WBC
PH UR STRIP: 7 [PH] (ref 5–8)
PLATELET # BLD AUTO: 188 K/UL (ref 150–400)
PMV BLD AUTO: 10.3 FL (ref 8.9–12.9)
POTASSIUM SERPL-SCNC: 3.6 MMOL/L (ref 3.5–5.1)
PROT SERPL-MCNC: 6.6 G/DL (ref 6.4–8.2)
PROT UR STRIP-MCNC: NEGATIVE MG/DL
RBC # BLD AUTO: 4.47 M/UL (ref 4.1–5.7)
RBC #/AREA URNS HPF: ABNORMAL /HPF (ref 0–5)
RBC MORPH BLD: ABNORMAL
SAMPLES BEING HELD,HOLD: NORMAL
SODIUM SERPL-SCNC: 136 MMOL/L (ref 136–145)
SP GR UR REFRACTOMETRY: 1.01 (ref 1–1.03)
UA: UC IF INDICATED,UAUC: ABNORMAL
UROBILINOGEN UR QL STRIP.AUTO: 0.2 EU/DL (ref 0.2–1)
WBC # BLD AUTO: 13.4 K/UL (ref 4.1–11.1)
WBC URNS QL MICRO: ABNORMAL /HPF (ref 0–4)

## 2020-09-02 PROCEDURE — 74011250636 HC RX REV CODE- 250/636: Performed by: NURSE ANESTHETIST, CERTIFIED REGISTERED

## 2020-09-02 PROCEDURE — 87040 BLOOD CULTURE FOR BACTERIA: CPT

## 2020-09-02 PROCEDURE — C1886 CATHETER, ABLATION: HCPCS | Performed by: INTERNAL MEDICINE

## 2020-09-02 PROCEDURE — 87086 URINE CULTURE/COLONY COUNT: CPT

## 2020-09-02 PROCEDURE — 96375 TX/PRO/DX INJ NEW DRUG ADDON: CPT

## 2020-09-02 PROCEDURE — 76060000031 HC ANESTHESIA FIRST 0.5 HR: Performed by: INTERNAL MEDICINE

## 2020-09-02 PROCEDURE — 36415 COLL VENOUS BLD VENIPUNCTURE: CPT

## 2020-09-02 PROCEDURE — 76040000019: Performed by: INTERNAL MEDICINE

## 2020-09-02 PROCEDURE — 71045 X-RAY EXAM CHEST 1 VIEW: CPT

## 2020-09-02 PROCEDURE — 80053 COMPREHEN METABOLIC PANEL: CPT

## 2020-09-02 PROCEDURE — 74177 CT ABD & PELVIS W/CONTRAST: CPT

## 2020-09-02 PROCEDURE — 51702 INSERT TEMP BLADDER CATH: CPT

## 2020-09-02 PROCEDURE — 99282 EMERGENCY DEPT VISIT SF MDM: CPT

## 2020-09-02 PROCEDURE — 96374 THER/PROPH/DIAG INJ IV PUSH: CPT

## 2020-09-02 PROCEDURE — 83605 ASSAY OF LACTIC ACID: CPT

## 2020-09-02 PROCEDURE — C1726 CATH, BAL DIL, NON-VASCULAR: HCPCS | Performed by: INTERNAL MEDICINE

## 2020-09-02 PROCEDURE — 74011000636 HC RX REV CODE- 636: Performed by: RADIOLOGY

## 2020-09-02 PROCEDURE — 77030005513 HC CATH URETH FOL11 MDII -B

## 2020-09-02 PROCEDURE — 74011250636 HC RX REV CODE- 250/636: Performed by: INTERNAL MEDICINE

## 2020-09-02 PROCEDURE — 86140 C-REACTIVE PROTEIN: CPT

## 2020-09-02 PROCEDURE — 74011000250 HC RX REV CODE- 250: Performed by: EMERGENCY MEDICINE

## 2020-09-02 PROCEDURE — 81001 URINALYSIS AUTO W/SCOPE: CPT

## 2020-09-02 PROCEDURE — 77030034696 HC CATH URETH FOL 2W BARD -A

## 2020-09-02 PROCEDURE — 74011000250 HC RX REV CODE- 250: Performed by: NURSE ANESTHETIST, CERTIFIED REGISTERED

## 2020-09-02 PROCEDURE — 74011250636 HC RX REV CODE- 250/636: Performed by: EMERGENCY MEDICINE

## 2020-09-02 PROCEDURE — 85025 COMPLETE CBC W/AUTO DIFF WBC: CPT

## 2020-09-02 RX ORDER — ATROPINE SULFATE 0.1 MG/ML
0.5 INJECTION INTRAVENOUS
Status: DISCONTINUED | OUTPATIENT
Start: 2020-09-02 | End: 2020-09-02 | Stop reason: HOSPADM

## 2020-09-02 RX ORDER — DEXTROMETHORPHAN/PSEUDOEPHED 2.5-7.5/.8
1.2 DROPS ORAL
Status: DISCONTINUED | OUTPATIENT
Start: 2020-09-02 | End: 2020-09-02 | Stop reason: HOSPADM

## 2020-09-02 RX ORDER — SODIUM CHLORIDE 9 MG/ML
INJECTION, SOLUTION INTRAVENOUS
Status: DISCONTINUED | OUTPATIENT
Start: 2020-09-02 | End: 2020-09-02 | Stop reason: HOSPADM

## 2020-09-02 RX ORDER — ONDANSETRON 2 MG/ML
8 INJECTION INTRAMUSCULAR; INTRAVENOUS
Status: COMPLETED | OUTPATIENT
Start: 2020-09-02 | End: 2020-09-02

## 2020-09-02 RX ORDER — NALOXONE HYDROCHLORIDE 0.4 MG/ML
0.4 INJECTION, SOLUTION INTRAMUSCULAR; INTRAVENOUS; SUBCUTANEOUS
Status: DISCONTINUED | OUTPATIENT
Start: 2020-09-02 | End: 2020-09-02 | Stop reason: HOSPADM

## 2020-09-02 RX ORDER — GLYCOPYRROLATE 0.2 MG/ML
INJECTION INTRAMUSCULAR; INTRAVENOUS AS NEEDED
Status: DISCONTINUED | OUTPATIENT
Start: 2020-09-02 | End: 2020-09-02 | Stop reason: HOSPADM

## 2020-09-02 RX ORDER — LIDOCAINE HYDROCHLORIDE 20 MG/ML
JELLY TOPICAL
Status: COMPLETED | OUTPATIENT
Start: 2020-09-02 | End: 2020-09-02

## 2020-09-02 RX ORDER — PROPOFOL 10 MG/ML
INJECTION, EMULSION INTRAVENOUS AS NEEDED
Status: DISCONTINUED | OUTPATIENT
Start: 2020-09-02 | End: 2020-09-02 | Stop reason: HOSPADM

## 2020-09-02 RX ORDER — FLUMAZENIL 0.1 MG/ML
0.2 INJECTION INTRAVENOUS
Status: DISCONTINUED | OUTPATIENT
Start: 2020-09-02 | End: 2020-09-02 | Stop reason: HOSPADM

## 2020-09-02 RX ORDER — MIDAZOLAM HYDROCHLORIDE 1 MG/ML
.25-5 INJECTION, SOLUTION INTRAMUSCULAR; INTRAVENOUS
Status: DISCONTINUED | OUTPATIENT
Start: 2020-09-02 | End: 2020-09-02 | Stop reason: HOSPADM

## 2020-09-02 RX ORDER — KETOROLAC TROMETHAMINE 30 MG/ML
15 INJECTION, SOLUTION INTRAMUSCULAR; INTRAVENOUS
Status: COMPLETED | OUTPATIENT
Start: 2020-09-02 | End: 2020-09-02

## 2020-09-02 RX ORDER — EPINEPHRINE 0.1 MG/ML
1 INJECTION INTRACARDIAC; INTRAVENOUS
Status: DISCONTINUED | OUTPATIENT
Start: 2020-09-02 | End: 2020-09-02 | Stop reason: HOSPADM

## 2020-09-02 RX ORDER — SODIUM CHLORIDE 9 MG/ML
50 INJECTION, SOLUTION INTRAVENOUS CONTINUOUS
Status: DISCONTINUED | OUTPATIENT
Start: 2020-09-02 | End: 2020-09-02 | Stop reason: HOSPADM

## 2020-09-02 RX ORDER — SODIUM CHLORIDE 0.9 % (FLUSH) 0.9 %
5-10 SYRINGE (ML) INJECTION AS NEEDED
Status: DISCONTINUED | OUTPATIENT
Start: 2020-09-02 | End: 2020-09-03 | Stop reason: HOSPADM

## 2020-09-02 RX ORDER — PROPOFOL 10 MG/ML
INJECTION, EMULSION INTRAVENOUS
Status: DISCONTINUED | OUTPATIENT
Start: 2020-09-02 | End: 2020-09-02 | Stop reason: HOSPADM

## 2020-09-02 RX ORDER — LIDOCAINE HYDROCHLORIDE 20 MG/ML
INJECTION, SOLUTION EPIDURAL; INFILTRATION; INTRACAUDAL; PERINEURAL AS NEEDED
Status: DISCONTINUED | OUTPATIENT
Start: 2020-09-02 | End: 2020-09-02 | Stop reason: HOSPADM

## 2020-09-02 RX ADMIN — ONDANSETRON 8 MG: 2 INJECTION INTRAMUSCULAR; INTRAVENOUS at 22:23

## 2020-09-02 RX ADMIN — SODIUM CHLORIDE 1000 ML: 900 INJECTION, SOLUTION INTRAVENOUS at 22:24

## 2020-09-02 RX ADMIN — SODIUM CHLORIDE: 9 INJECTION, SOLUTION INTRAVENOUS at 09:05

## 2020-09-02 RX ADMIN — LIDOCAINE HYDROCHLORIDE 80 MG: 20 INJECTION, SOLUTION INTRAVENOUS at 09:24

## 2020-09-02 RX ADMIN — LIDOCAINE HYDROCHLORIDE: 20 JELLY TOPICAL at 22:23

## 2020-09-02 RX ADMIN — PROPOFOL 80 MG: 10 INJECTION, EMULSION INTRAVENOUS at 09:24

## 2020-09-02 RX ADMIN — PROPOFOL 100 MCG/KG/MIN: 10 INJECTION, EMULSION INTRAVENOUS at 09:24

## 2020-09-02 RX ADMIN — WATER 1 G: 1 INJECTION INTRAMUSCULAR; INTRAVENOUS; SUBCUTANEOUS at 23:24

## 2020-09-02 RX ADMIN — SODIUM CHLORIDE: 9 INJECTION, SOLUTION INTRAVENOUS at 09:46

## 2020-09-02 RX ADMIN — GLYCOPYRROLATE 0.1 MG: 0.2 INJECTION INTRAMUSCULAR; INTRAVENOUS at 09:21

## 2020-09-02 RX ADMIN — KETOROLAC TROMETHAMINE 15 MG: 30 INJECTION, SOLUTION INTRAMUSCULAR at 22:23

## 2020-09-02 RX ADMIN — PROPOFOL 40 MG: 10 INJECTION, EMULSION INTRAVENOUS at 09:27

## 2020-09-02 RX ADMIN — PROPOFOL 30 MG: 10 INJECTION, EMULSION INTRAVENOUS at 09:42

## 2020-09-02 RX ADMIN — LIDOCAINE HYDROCHLORIDE 20 MG: 20 INJECTION, SOLUTION INTRAVENOUS at 09:28

## 2020-09-02 RX ADMIN — SODIUM CHLORIDE 50 ML/HR: 900 INJECTION, SOLUTION INTRAVENOUS at 09:15

## 2020-09-02 RX ADMIN — PROPOFOL 30 MG: 10 INJECTION, EMULSION INTRAVENOUS at 09:34

## 2020-09-02 RX ADMIN — IOPAMIDOL 100 ML: 755 INJECTION, SOLUTION INTRAVENOUS at 23:28

## 2020-09-02 RX ADMIN — GLYCOPYRROLATE 0.1 MG: 0.2 INJECTION INTRAMUSCULAR; INTRAVENOUS at 09:31

## 2020-09-02 NOTE — PROCEDURES
[facility] [address]  [address] [facility], [address] [facility]       [phone]                   2020                EGD Operative Report  Noemi Munoz  :  1945  [facility] Medical Record Number:  [id number]      Indication: Small's intramucosal ca/ RFA     : Rigo Velez MD    Assistants: None    Referring Provider:  Flavio Ryder MD      Anesthesia/Sedation:  MAC anesthesia Propofol    Airway assessment: No airway problems anticipated    Pre-Procedural Exam:      Airway: clear, no airway problems anticipated  Heart: RRR, without gallops or rubs  Lungs: clear bilaterally without wheezes, crackles, or rhonchi  Abdomen: soft, nontender, nondistended, bowel sounds present  Mental Status: awake, alert and oriented to person, place and time       Procedure Details     After infomed consent was obtained for the procedure, with all risks and benefits of procedure explained the patient was taken to the endoscopy suite and placed in the left lateral decubitus position. Following sequential administration of sedation as per above, the endoscope was inserted into the mouth and advanced under direct vision to second portion of the duodenum. A careful inspection was made as the gastroscope was withdrawn, including a retroflexed view of the proximal stomach; findings and interventions are described below. Findings:   Esophagus:    Islands of [place] colored mucosa noted on exam. Circumferential Small's was no longer present. Neosquamous epithelium noted replacing almost 60 % of previous circumferential Barett's. Endoscope  NBI and White light HD detailed exam was performed of the esophagus. No nodular lesions or ulcers noted. Area of Small's islands from esophagus extended from 30 to 36 cm . [place] Classification C0 M4. Area of interest at 29 cm was extensively examined and no nodular or small's epithelium areas were seen.  The upper endoscope was then withdrawn in anticipation of RFA  tx.           Therapies: The endoscope was then re- introduced and the ablation catheter was placed at the tip (  HALO 90 device ) Pool's tissue was then targeted . The endoscope  with the ablation catheter was positioned under direct visualization . Ablation catheter was placed  in contact with Pool's tissue in a circumferential fashion for energy delivery and ablation. The scope was then removed and area was cleared and prepared for a second ablation.  A total of 20 ablations of the area were performed at 12 W/cm2 and energy density 12 J/cm. All Pool's tissue was ablated. Post ablation images were satisfactory. No bleeding noted      Therapies:  As above    Specimens: none    Implants: none           Complications:   None; patient tolerated the procedure well. EBL:  None. Impression:    1-Barretts Esophagus                             2- RFA of Pool's esophagus    Recommendations:    -Acid suppression with a proton pump inhibitor. ,  -No NSAIDS, etoh or smoking  -Post ablation recommendations  -Repeat EGD in 3-4 months    Dk Ulrich MD

## 2020-09-02 NOTE — DISCHARGE INSTRUCTIONS
[address] [facility] [address], [address] [facility]  [phone]      Discharge Instructions after Ablation of Pool's Esophagus    You have undergone an upper Endoscopy with ablation of Pool's esophagus. You may experience one or more of the following symptoms after treatment: chest discomfort, sore throat, difficulty or pain when swallowing and /or nausea/vomiting. These symptoms should improve with each day. You will be provided with several medications and specific instructions (below) to make you as comfortable as possible during this time. Should any of your symptoms be more severe in nature or longer in duration then we have described, please contact your physician. It is important to continue a strict and long-term regimen of anti-secretory medication after this treatment, such as Nexium, Prevacid, or other similar medication. ·  Maximize anti-secretory regimen, per MD  Please DO NOT stop taking this medicine. If you are unable to swallow the pill you may crush it if allowed or contact your physician for a liquid form. ·  Antacid/lidocaine mixture by mouth as needed, per MD    ·  Liquid acetaminophen (Tylenol) with or without codeine by mouth as needed, per  MD    ·  Anti-emetic (anti-nausea) medication per rectum as needed, per MD    ·  Full liquid diet for 24 hours, and then advance to soft diet for 1 week. Avoid extreme cold or hot beverage and food. Avoid aspirin or non-steroidal anti-inflammatory medications (motrin, advil, and Ibuprofen)    ·  Contact your treating physician immediately for significant chest pain, difficulty swallowing, fever, bleeding, abdominal pain, difficulty breathing, vomiting or other warning signs provided by the physician, so that the physician may complete the appropriate diagnostic work-up and/or interventions as needed to avoid complications.     ·  If you seek care for a digestive issue from any healthcare personnel in the  next 6 months following this procedure, other than the treating physician, the treating physician should be consulted before any treatment is initiated.

## 2020-09-02 NOTE — ROUTINE PROCESS
Adan Andry 1945 
[id number] Situation: 
Verbal report received from: [employer] Procedure: Procedure(s): ENDOSCOPIC HALO ABLATION 
ESOPHAGOGASTRODUODENOSCOPY (EGD) Background: 
 
Preoperative diagnosis: BARRETTS ESOPHAGUS Postoperative diagnosis: 1-Barretts Esophagus :  Dr. Armani Machado Assistant(s): Endoscopy RN-1: Ar Brooks RN; Blanca Martines Endoscopy RN-2: Iman Orellana RN Specimens: * No specimens in log * H. Pylori  no Assessment: 
Intra-procedure medications Anesthesia gave intra-procedure sedation and medications, see anesthesia flow sheet yes Intravenous fluids: NS@ Fahad Cornea Vital signs stable Abdominal assessment: round and soft Recommendation: 
Discharge patient per MD order. Family Chad Valle Permission to share finding with family or friend yes

## 2020-09-02 NOTE — H&P
[address] [facility]  [address], [address] [facility]  [phone]                     History and Physical     NAME: Shruthi Giraldo   :  1945   MRN:  [id number]     HPI:  Pt is scheduled for elective EGD possible RFA. He had  EGD    with findings of flat LS Pool's. Bx showed \"intramucosal ca\" at 29 cm and rest NDBE. Non invasive features per pathologist report. No nodules or raised areas noted per endoscopy report). Previous diagnostic tests have not included CT scan or UGI. Note for \"Pool's esophagus\": Pt has had LSBEw/o dysplasia for 2 yrs. He has been followed by Dr Lolita Marie . Pt reports no complains at this time. Past Surgical History:   Procedure Laterality Date    COLONOSCOPY      normal except hemorrhoids    HX ANKLE FRACTURE TX Left 6/29/15    Dr. Michelle Rao    HX COLONOSCOPY  10/3/14    1 hyperplastic polyp. Milka    HX ENDOSCOPY  10/02/2018    HX ENDOSCOPY  2018    HX ENDOSCOPY  05/15/2017    HX ENDOSCOPY  10/02/2014    HX HERNIA REPAIR Bilateral 2014    Dr. Garibay Nurse OTHER SURGICAL      Laparoscopic bilateral inguinal hernia repair with mesh    HX TONSILLECTOMY      HX UROLOGICAL  2005    prostate biopsy x 2- benign    PROSTATE BIOPSIES  ,     saw Dr. Dariana Henderson. Now Dr. Chapincito Pabon     Past Medical History:   Diagnosis Date    Abnormal celiac antibody panel 2017    Ankle fracture, left 6/29/15    Dr. Michelle Rao. and prox fibula. s/p surgery    AR (allergic rhinitis)     Arthritis     ritesh hands   [place]     Dr. Peter Massey, Dr. Lolita Marie. EGD 2020 intramural adenocarcinoma. repeat endoscopy 2018 (low grade). Dr. Lolita Marie    Cyst of left kidney     left. stable mid-pole cyst 2020    Elevated prostate specific antigen (PSA)     Dr. Chapincito Pabon. Dr. Dariana Henderson peak 14.3, bx 2008,     Enlarged prostate     Esophageal adenocarcinoma ([facility] [address] 2020    intramural, non-invasive.   non-virualed seen on biopsies. s/p albations w HALO 2020, 2020, 2020    Esophageal motility disorder 2017    +hiatal hernia    GERD (gastroesophageal reflux disease)     Hearing loss     hearing test 2009- bilat.  HTN (hypertension)     diet treated, stress echo  nl    Kidney cysts     left. s/p biopsy  Dr. Malaika Becker Left leg paresthesias     medical left leg. since left leg fracuture 2015    Normal cardiac stress test 2017    Onychomycosis     CLARICE (obstructive sleep apnea)     Dr. Brittney Coffey. wears CPAP at night setting 8.5    Panic attacks 2002    work-related    Pleurisy     Pulmonary nodule, right 2020    4 mm RLL pleural based nodule. CT [facility]. new compared to 18.  Right inguinal hernia     Sinus bradycardia     Squamous cell carcinoma     left temple Dr. Fabiano Santiago    UTI (urinary tract infection) 2008     Social History     Tobacco Use    Smoking status: Former Smoker     Packs/day: 1.00     Years: 10.00     Pack years: 10.00     Types: Cigarettes, Cigars     Last attempt to quit: 1978     Years since quittin.6    Smokeless tobacco: Former User   Substance Use Topics    Alcohol use: Not Currently     Alcohol/week: 3.0 standard drinks     Types: 3 Standard drinks or equivalent per week     Comment: no drinking in over 2 years    Drug use: No     No Known Allergies  Family History   Problem Relation Age of Onset   [place] [facility] Cancer Mother         leukemia    Celiac Disease Mother     Diabetes Father         age 58    Hypertension Father     Heart Disease Father     Celiac Disease Daughter     Heart Disease Maternal Grandmother      No current facility-administered medications for this encounter. PHYSICAL EXAM:  General: WD, WN. Alert, cooperative, no acute distress    HEENT: NC, Atraumatic. PERRLA, EOMI. Anicteric sclerae. Lungs:  CTA Bilaterally. No Wheezing/Rhonchi/Rales.   Heart:  Regular  rhythm,  No murmur, No Rubs, No Gallops  Abdomen: Soft, Non distended, Non tender.  +Bowel sounds, no HSM  Extremities: No c/c/e  Neurologic:  CN 2-12 gi, Alert and oriented X 3. No acute neurological distress   Psych:   Good insight. Not anxious nor agitated. Assessment:   I have reviewed with the patient +/- family alternatives,benefits and risks for the procedure, as well as potential complications(with emphasis on, but not limited to, bleeding, perforation, cardiovascular/cerebrovascular/pulmonary events, reactions to the medications, infection, risk of missing a lesions/a cancer, and the imponderables including death), alternative options, and patient/family voices understanding.       Plan:   · Endoscopic procedure  · Conscious sedation or MAC

## 2020-09-02 NOTE — ROUTINE PROCESS
Endoscopy discharge instructions have been reviewed and given to patient. The patient verbalized understanding and acceptance of instructions. Dr. Aneta Chase discussed with patient and spouse procedure findings and next steps.

## 2020-09-02 NOTE — ANESTHESIA POSTPROCEDURE EVALUATION
Procedure(s):  ENDOSCOPIC HALO ABLATION  ESOPHAGOGASTRODUODENOSCOPY (EGD). MAC    Anesthesia Post Evaluation        Patient location during evaluation: bedside  Level of consciousness: awake  Pain management: satisfactory to patient  Airway patency: patent  Anesthetic complications: no  Cardiovascular status: acceptable  Respiratory status: acceptable  Hydration status: acceptable        INITIAL Post-op Vital signs:   Vitals Value Taken Time   /86 9/2/2020 10:11 AM   Temp 36.4 °C (97.5 °F) 9/2/2020  9:55 AM   Pulse 75 9/2/2020 10:12 AM   Resp 18 9/2/2020 10:12 AM   SpO2 95 % 9/2/2020 10:12 AM   Vitals shown include unvalidated device data.

## 2020-09-02 NOTE — PERIOP NOTES
Received report from Romelia Estrada CRNA. See anesthesia record. Patient taken to post-recovery. Post-recovery report given to Titus Pena RN. Patient's ABD remains soft and non-tender post procedure. Pt has no complaints at this time and tolerated the procedure well. Endoscope was pre-cleaned at bedside immediately following procedure by Court Reynolds.

## 2020-09-03 PROCEDURE — 77030012865 HC BG URIN LEG MDII -A

## 2020-09-03 RX ORDER — CEFDINIR 300 MG/1
300 CAPSULE ORAL 2 TIMES DAILY
Qty: 20 CAP | Refills: 0 | Status: SHIPPED | OUTPATIENT
Start: 2020-09-03 | End: 2021-01-19

## 2020-09-03 RX ORDER — IBUPROFEN 800 MG/1
800 TABLET ORAL
Qty: 30 TAB | Refills: 0 | Status: SHIPPED | OUTPATIENT
Start: 2020-09-03 | End: 2022-03-30 | Stop reason: ALTCHOICE

## 2020-09-03 RX ORDER — ACETAMINOPHEN 500 MG
1000 TABLET ORAL
Qty: 30 TAB | Refills: 0 | Status: SHIPPED | OUTPATIENT
Start: 2020-09-03

## 2020-09-03 NOTE — DISCHARGE INSTRUCTIONS
Patient Education        Diverticulosis: Care Instructions  Your Care Instructions  In diverticulosis, pouches called diverticula form in the wall of the large intestine (colon). The pouches do not cause any pain or other symptoms. Most people who have diverticulosis do not know they have it. But the pouches sometimes bleed, and if they become infected, they can cause pain and other symptoms. When this happens, it is called diverticulitis. Diverticula form when pressure pushes the wall of the colon outward at certain weak points. A diet that is too low in fiber can cause diverticula. Follow-up care is a key part of your treatment and safety. Be sure to make and go to all appointments, and call your doctor if you are having problems. It's also a good idea to know your test results and keep a list of the medicines you take. How can you care for yourself at home? · Include fruits, leafy green vegetables, beans, and whole grains in your diet each day. These foods are high in fiber. · Take a fiber supplement, such as Citrucel or Metamucil, every day if needed. Read and follow all instructions on the label. · Drink plenty of fluids, enough so that your urine is light yellow or clear like water. If you have kidney, heart, or liver disease and have to limit fluids, talk with your doctor before you increase the amount of fluids you drink. · Get at least 30 minutes of exercise on most days of the week. Walking is a good choice. You also may want to do other activities, such as running, swimming, cycling, or playing tennis or team sports. · Cut out foods that cause gas, pain, or other symptoms. When should you call for help? Call your doctor now or seek immediate medical care if:  · You have belly pain. · You pass maroon or very bloody stools. · You have a fever. · You have nausea and vomiting. · You have unusual changes in your bowel movements or abdominal swelling.   · You have burning pain when you urinate. · You have abnormal vaginal discharge. · You have shoulder pain. · You have cramping pain that does not get better when you have a bowel movement or pass gas. · You pass gas or stool from your urethra while urinating. Watch closely for changes in your health, and be sure to contact your doctor if you have any problems. Where can you learn more? Go to http://nasir-abdi.info/  Enter [id number] in the search box to learn more about \"Diverticulosis: Care Instructions. \"  Current as of: August 12, 2019               Content Version: 12.5  © 4321-3911 [employer]. Care instructions adapted under license by [employer] (which disclaims liability or warranty for this information). If you have questions about a medical condition or this instruction, always ask your healthcare professional. [address] any warranty or liability for your use of this information. Patient Education        Learning About Fever  What is a fever? A fever is a high body temperature. It's one way your body fights being sick. A fever shows that the body is responding to infection or other illnesses, both minor and severe. A fever is a symptom, not an illness by itself. A fever can be a sign that you are ill, but most fevers are not caused by a serious problem. You may have a fever with a minor illness, such as a cold. But sometimes a very serious infection may cause little or no fever. It is important to look at other symptoms, other conditions you have, and how you feel in general. In children, notice how they act and see what symptoms they complain of. What is a normal body temperature? A normal body temperature is about 98. 6ºF. Some people have a normal temperature that is a little higher or a little lower than this. Your temperature may be a little lower in the morning than it is later in the day.  It may go up during hot weather or when you exercise, wear heavy clothes, or take a hot bath. Your temperature may also be different depending on how you take it. A temperature taken in the mouth (oral) or under the arm may be a little lower than your core temperature (rectal). What is a fever temperature? A core temperature of 100.4°F or above is considered a fever. What can cause a fever? A fever may be caused by:  · Infections. This is the most common cause of a fever. Examples of infections that can cause a fever include the flu, a kidney infection, or pneumonia. · Some medicines. · Severe trauma or injury, such as a heart attack, stroke, heatstroke, or burns. · Other medical conditions, such as arthritis and some cancers. How can you treat a fever at home? · Ask your doctor if you can take an over-the-counter pain medicine, such as acetaminophen (Tylenol), ibuprofen (Advil, Motrin), or naproxen (Aleve). Be safe with medicines. Read and follow all instructions on the label. · To prevent dehydration, drink plenty of fluids. Choose water and other caffeine-free clear liquids until you feel better. If you have kidney, heart, or liver disease and have to limit fluids, talk with your doctor before you increase the amount of fluids you drink. Follow-up care is a key part of your treatment and safety. Be sure to make and go to all appointments, and call your doctor if you are having problems. It's also a good idea to know your test results and keep a list of the medicines you take. Where can you learn more? Go to http://www.gray.com/  Enter G732 in the search box to learn more about \"Learning About Fever. \"  Current as of: June 26, 2019               Content Version: 12.5  © 5399-6937 [employer]. Care instructions adapted under license by [employer] (which disclaims liability or warranty for this information).  If you have questions about a medical condition or this instruction, always ask your healthcare professional. [address] any warranty or liability for your use of this information. Patient Education        Urinary Retention: Care Instructions  Your Care Instructions     Urinary retention means that you aren't able to urinate. In men, it is often caused by a blockage of the urinary tract from an enlarged prostate gland. In men and women, it can also be caused by an infection or nerve damage. Or it may be a side effect of a medicine. The doctor may have drained the urine from your bladder. If you still have problems passing urine, you may need to use a catheter at home. This is used to empty your bladder until the problem can be fixed. Your doctor may put a catheter in your bladder before you go home. If so, he or she will tell you when to come back to have the catheter removed. The doctor has checked you closely. But problems can develop later. If you notice any problems or new symptoms, get medical treatment right away. Follow-up care is a key part of your treatment and safety. Be sure to make and go to all appointments, and call your doctor if you are having problems. It's also a good idea to know your test results and keep a list of the medicines you take. How can you care for yourself at home? · Take your medicines exactly as prescribed. Call your doctor if you think you are having a problem with your medicine. You will get more details on the specific medicines your doctor prescribes. · Check with your doctor before you use any over-the-counter medicines. Many cold and allergy medicines, for example, can make this problem worse. Make sure your doctor knows all of the medicines, vitamins, supplements, and herbal remedies you take. · Spread out through the day the amount of fluid you drink. Do not drink a lot at bedtime. · Avoid alcohol and caffeine. · If you have been given a catheter, or if one is already in place, follow the instructions you were given.  Always wash your hands before and after you handle the catheter. When should you call for help? Call your doctor now or seek immediate medical care if:  · You cannot urinate at all, or it is getting harder to urinate. · You have symptoms of a urinary tract infection. These may include:  ? Pain or burning when you urinate. ? A frequent need to urinate without being able to pass much urine. ? Pain in the flank, which is just below the rib cage and above the waist on either side of the back. ? Blood in your urine. ? A fever. Watch closely for changes in your health, and be sure to contact your doctor if:  · You have any problems with your catheter. · You do not get better as expected. Where can you learn more? Go to http://nasir-abdi.info/  Enter M244 in the search box to learn more about \"Urinary Retention: Care Instructions. \"  Current as of: August 22, 2019               Content Version: 12.5  © 9933-9150 [employer]. Care instructions adapted under license by [facility] [employer] [facility] (which disclaims liability or warranty for this information). If you have questions about a medical condition or this instruction, always ask your healthcare professional. [place] [address] any warranty or liability for your use of this information.

## 2020-09-03 NOTE — ED TRIAGE NOTES
Endoscopy ablation today with Dr Jed Appiah, has been having difficulty urinating all day long since his procedure. Ran a 102 fever earlier and took tylenol about 1 hour ago. States his back and flanks are starting hurt pretty bad.

## 2020-09-04 LAB
BACTERIA SPEC CULT: NORMAL
SERVICE CMNT-IMP: NORMAL

## 2020-09-07 LAB
BACTERIA SPEC CULT: NORMAL
SERVICE CMNT-IMP: NORMAL

## 2020-11-23 DIAGNOSIS — I10 ESSENTIAL HYPERTENSION: ICD-10-CM

## 2020-11-23 RX ORDER — IRBESARTAN 150 MG/1
TABLET ORAL
Qty: 90 TAB | Refills: 1 | Status: SHIPPED | OUTPATIENT
Start: 2020-11-23 | End: 2021-01-26 | Stop reason: SDUPTHER

## 2021-01-17 ENCOUNTER — HOSPITAL ENCOUNTER (OUTPATIENT)
Dept: LAB | Age: 76
Discharge: HOME | End: 2021-01-17
Payer: MEDICARE

## 2021-01-17 ENCOUNTER — TRANSCRIBE ORDER (OUTPATIENT)
Dept: EMERGENCY DEPT | Age: 76
End: 2021-01-17

## 2021-01-17 DIAGNOSIS — Z01.812 PRE-PROCEDURAL LABORATORY EXAMINATIONS: Primary | ICD-10-CM

## 2021-01-17 DIAGNOSIS — Z01.812 PRE-PROCEDURAL LABORATORY EXAMINATIONS: ICD-10-CM

## 2021-01-17 PROCEDURE — U0003 INFECTIOUS AGENT DETECTION BY NUCLEIC ACID (DNA OR RNA); SEVERE ACUTE RESPIRATORY SYNDROME CORONAVIRUS 2 (SARS-COV-2) (CORONAVIRUS DISEASE [COVID-19]), AMPLIFIED PROBE TECHNIQUE, MAKING USE OF HIGH THROUGHPUT TECHNOLOGIES AS DESCRIBED BY CMS-2020-01-R: HCPCS

## 2021-01-19 LAB — SARS-COV-2, COV2NT: NOT DETECTED

## 2021-01-19 NOTE — PERIOP NOTES
Jenelle Case  Endoscopy Preprocedure Instructions      1. On the day of your surgery, please report to registration located on the 2nd floor of the  [facility]. yes    2. You must have a responsible adult to drive you to the hospital, stay at the hospital during your procedure and drive you home. If they leave your procedure will not be started (no exceptions). yes    3. Do not have anything to eat or drink (including water, gum, mints, coffee, and juice) after midnight. This does not apply to the medications you were instructed to take by your physician. yesIf you are currently taking Plavix, Coumadin, Aspirin, or other blood-thinning agents, contact your physician for special instructions. yes,    4. If you are having a procedure that requires bowel prep: We recommend that you have only clear liquids the day before your procedure and begin your bowel prep by 5:00 pm.  You may continue to drink clear liquids until midnight. If for any reason you are not able to complete your prep please notify your physician immediately. yes    5. Have a list of all current medications, including vitamins, herbal supplements and any other over the counter medications. yes    6. If you wear glasses, contacts, dentures and/or hearing aids, they may be removed prior to procedure, please bring a case to store them in. yes    7. You should understand that if you do not follow these instructions your procedure may be cancelled. If your physical condition changes (I.e. fever, cold or flu) please contact your doctor as soon as possible. 8. It is important that you be on time.   If for any reason you are unable to keep your appointment please call [phone] the day of or your physicians office prior to your scheduled procedure

## 2021-01-21 ENCOUNTER — HOSPITAL ENCOUNTER (OUTPATIENT)
Age: 76
Setting detail: OUTPATIENT SURGERY
Discharge: HOME | End: 2021-01-21
Attending: INTERNAL MEDICINE | Admitting: INTERNAL MEDICINE
Payer: MEDICARE

## 2021-01-21 ENCOUNTER — ANESTHESIA EVENT (OUTPATIENT)
Dept: ENDOSCOPY | Age: 76
End: 2021-01-21
Payer: MEDICARE

## 2021-01-21 ENCOUNTER — ANESTHESIA (OUTPATIENT)
Dept: ENDOSCOPY | Age: 76
End: 2021-01-21
Payer: MEDICARE

## 2021-01-21 VITALS
RESPIRATION RATE: 18 BRPM | HEART RATE: 60 BPM | WEIGHT: 214.29 LBS | OXYGEN SATURATION: 99 % | HEIGHT: 67 IN | DIASTOLIC BLOOD PRESSURE: 92 MMHG | BODY MASS INDEX: 33.63 KG/M2 | SYSTOLIC BLOOD PRESSURE: 154 MMHG | TEMPERATURE: 98 F

## 2021-01-21 PROCEDURE — 76040000019: Performed by: INTERNAL MEDICINE

## 2021-01-21 PROCEDURE — 77030021593 HC FCPS BIOP ENDOSC BSC -A: Performed by: INTERNAL MEDICINE

## 2021-01-21 PROCEDURE — 74011250636 HC RX REV CODE- 250/636: Performed by: INTERNAL MEDICINE

## 2021-01-21 PROCEDURE — 74011250636 HC RX REV CODE- 250/636: Performed by: NURSE ANESTHETIST, CERTIFIED REGISTERED

## 2021-01-21 PROCEDURE — 74011000250 HC RX REV CODE- 250: Performed by: NURSE ANESTHETIST, CERTIFIED REGISTERED

## 2021-01-21 PROCEDURE — 2709999900 HC NON-CHARGEABLE SUPPLY: Performed by: INTERNAL MEDICINE

## 2021-01-21 PROCEDURE — 76060000031 HC ANESTHESIA FIRST 0.5 HR: Performed by: INTERNAL MEDICINE

## 2021-01-21 PROCEDURE — 88305 TISSUE EXAM BY PATHOLOGIST: CPT

## 2021-01-21 RX ORDER — PROPOFOL 10 MG/ML
INJECTION, EMULSION INTRAVENOUS AS NEEDED
Status: DISCONTINUED | OUTPATIENT
Start: 2021-01-21 | End: 2021-01-21 | Stop reason: HOSPADM

## 2021-01-21 RX ORDER — ATROPINE SULFATE 0.1 MG/ML
0.4 INJECTION INTRAVENOUS
Status: DISCONTINUED | OUTPATIENT
Start: 2021-01-21 | End: 2021-01-21 | Stop reason: HOSPADM

## 2021-01-21 RX ORDER — DEXTROMETHORPHAN/PSEUDOEPHED 2.5-7.5/.8
1.2 DROPS ORAL
Status: DISCONTINUED | OUTPATIENT
Start: 2021-01-21 | End: 2021-01-21 | Stop reason: HOSPADM

## 2021-01-21 RX ORDER — LIDOCAINE HYDROCHLORIDE 20 MG/ML
INJECTION, SOLUTION EPIDURAL; INFILTRATION; INTRACAUDAL; PERINEURAL AS NEEDED
Status: DISCONTINUED | OUTPATIENT
Start: 2021-01-21 | End: 2021-01-21 | Stop reason: HOSPADM

## 2021-01-21 RX ORDER — FLUMAZENIL 0.1 MG/ML
0.2 INJECTION INTRAVENOUS
Status: DISCONTINUED | OUTPATIENT
Start: 2021-01-21 | End: 2021-01-21 | Stop reason: HOSPADM

## 2021-01-21 RX ORDER — PROPOFOL 10 MG/ML
INJECTION, EMULSION INTRAVENOUS
Status: DISCONTINUED | OUTPATIENT
Start: 2021-01-21 | End: 2021-01-21 | Stop reason: HOSPADM

## 2021-01-21 RX ORDER — SODIUM CHLORIDE 9 MG/ML
50 INJECTION, SOLUTION INTRAVENOUS CONTINUOUS
Status: DISCONTINUED | OUTPATIENT
Start: 2021-01-21 | End: 2021-01-21 | Stop reason: HOSPADM

## 2021-01-21 RX ORDER — EPINEPHRINE 0.1 MG/ML
1 INJECTION INTRACARDIAC; INTRAVENOUS
Status: DISCONTINUED | OUTPATIENT
Start: 2021-01-21 | End: 2021-01-21 | Stop reason: HOSPADM

## 2021-01-21 RX ORDER — NALOXONE HYDROCHLORIDE 0.4 MG/ML
0.4 INJECTION, SOLUTION INTRAMUSCULAR; INTRAVENOUS; SUBCUTANEOUS
Status: DISCONTINUED | OUTPATIENT
Start: 2021-01-21 | End: 2021-01-21 | Stop reason: HOSPADM

## 2021-01-21 RX ORDER — CHLORPHENIRAMINE MALEATE 4 MG
4 TABLET ORAL
COMMUNITY
End: 2022-03-30 | Stop reason: ALTCHOICE

## 2021-01-21 RX ORDER — MIDAZOLAM HYDROCHLORIDE 1 MG/ML
.25-5 INJECTION, SOLUTION INTRAMUSCULAR; INTRAVENOUS
Status: DISCONTINUED | OUTPATIENT
Start: 2021-01-21 | End: 2021-01-21 | Stop reason: HOSPADM

## 2021-01-21 RX ADMIN — PROPOFOL 140 MCG/KG/MIN: 10 INJECTION, EMULSION INTRAVENOUS at 09:23

## 2021-01-21 RX ADMIN — SODIUM CHLORIDE 50 ML/HR: 9 INJECTION, SOLUTION INTRAVENOUS at 08:13

## 2021-01-21 RX ADMIN — PROPOFOL 80 MG: 10 INJECTION, EMULSION INTRAVENOUS at 09:23

## 2021-01-21 RX ADMIN — LIDOCAINE HYDROCHLORIDE 40 MG: 20 INJECTION, SOLUTION INTRAVENOUS at 09:23

## 2021-01-21 NOTE — PROGRESS NOTES
Endoscopy discharge instructions have been reviewed and given to patient. The patient verbalized understanding and acceptance of instructions. Dr. Renee Haskins discussed with patient procedure findings and next steps.

## 2021-01-21 NOTE — H&P
Sloane Foster M.D.  [phone]            History and Physical       NAME:  Aniyah Rao   :   1945   MRN:   [id number]       Referring Physician:  Dr. Jhon Montes Date: 2021 9:18 AM    Chief Complaint:  Pool's esophagus    History of Present Illness:  Patient is a 70 y.o. who is seen for Pool's surveillance. His last RFA was in September and did well since then. PMH:  Past Medical History:   Diagnosis Date    Abnormal celiac antibody panel 2017    Adverse effect of anesthesia 2020    could not urinate post procedure - catheter for over a week    Ankle fracture, left 6/29/15    Dr. Renee Shoemaker. and prox fibula. s/p surgery    AR (allergic rhinitis)     Arthritis     ritesh hands   Ana Gist     Dr. Sabiha Wheeler, Dr. Art Payton. EGD 2020 intramural adenocarcinoma. repeat endoscopy 2018 (low grade). Dr. Art Payton    Cyst of left kidney     left. stable mid-pole cyst 2020    Elevated prostate specific antigen (PSA)     Dr. Tasneem Resendiz. Dr. Evert Vogt peak 14.3, bx 2008,     Enlarged prostate     Esophageal adenocarcinoma ([facility] [address] 2020    intramural, non-invasive. non-virualed seen on biopsies. s/p albations w HALO 2020, 2020, 2020    Esophageal motility disorder 2017    +hiatal hernia    GERD (gastroesophageal reflux disease)     Hearing loss     hearing test 2009- bilat.  HTN (hypertension)     diet treated, stress echo  nl    Kidney cysts     left. s/p biopsy  Dr. Doug Arellano Left leg paresthesias     medical left leg. since left leg fracuture 2015    Normal cardiac stress test 2017    Onychomycosis     CLARICE (obstructive sleep apnea)     Dr. Dandre Montano. wears CPAP at night setting 8.5    Panic attacks 2002    work-related    Pleurisy     Pulmonary nodule, right 2020    4 mm RLL pleural based nodule. CT 20 [facility]. new compared to 18.     Right inguinal hernia     Sinus bradycardia  Squamous cell carcinoma 1989    left temple Dr. Keturah Liu    UTI (urinary tract infection) 04/2008       PSH:  Past Surgical History:   Procedure Laterality Date    COLONOSCOPY  2002    normal except hemorrhoids    HX ANKLE FRACTURE TX Left 6/29/15    Dr. Acey Dandy    HX COLONOSCOPY  10/3/14    1 hyperplastic polyp. Milka    HX ENDOSCOPY  10/02/2018    HX ENDOSCOPY  05/21/2018    HX ENDOSCOPY  05/15/2017    HX ENDOSCOPY  10/02/2014    HX HERNIA REPAIR Bilateral 12/19/2014    Dr. Santos Estimable OTHER SURGICAL      Laparoscopic bilateral inguinal hernia repair with mesh    HX TONSILLECTOMY      HX UROLOGICAL  2005 2008    prostate biopsy x 2- benign    PROSTATE BIOPSIES  2006, 2008    saw Dr. Kaia Burgos. Now Dr. Ness Philip       Allergies:  No Known Allergies    Home Medications:  Prior to Admission Medications   Prescriptions Last Dose Informant Patient Reported? Taking? SAW PALMETTO PO 1/21/2021 at Unknown time  Yes Yes   Sig: Take 1 Tab by mouth two (2) times a day. acetaminophen (TYLENOL) 500 mg tablet 12/21/2020 at Unknown time  No Yes   Sig: Take 2 Tabs by mouth every six (6) hours as needed for Pain or Fever. chlorpheniramine (CHLOR-TRIMETRON) 4 mg tablet 1/21/2021 at Unknown time  Yes Yes   Sig: Take 4 mg by mouth every six (6) hours as needed for Allergies. cpap machine kit   Yes No   Sig: by Does Not Apply route. ibuprofen (MOTRIN) 800 mg tablet 12/21/2020 at Unknown time  No Yes   Sig: Take 1 Tab by mouth every six (6) hours as needed for Pain (Fever). irbesartan (AVAPRO) 150 mg tablet 1/20/2021 at Unknown time  No Yes   Sig: TAKE 1 TABLET BY MOUTH EVERY DAY   omeprazole (PRILOSEC) 40 mg capsule 1/21/2021 at Unknown time  Yes Yes   Sig: TK 1 C PO BID   tamsulosin (FLOMAX) 0.4 mg capsule 1/21/2021 at Unknown time  Yes Yes   Sig: Take 0.4 mg by mouth daily.       Facility-Administered Medications: None       Hospital Medications:  Current Facility-Administered Medications   Medication Dose Route Frequency    0.9% sodium chloride infusion  50 mL/hr IntraVENous CONTINUOUS    midazolam (VERSED) injection 0.25-5 mg  0.25-5 mg IntraVENous Multiple    naloxone (NARCAN) injection 0.4 mg  0.4 mg IntraVENous Multiple    flumazeniL (ROMAZICON) 0.1 mg/mL injection 0.2 mg  0.2 mg IntraVENous Multiple    simethicone (MYLICON) 13RK/8.2DZ oral drops 80 mg  1.2 mL Oral Multiple    atropine injection 0.4 mg  0.4 mg IntraVENous ONCE PRN    EPINEPHrine (ADRENALIN) 0.1 mg/mL syringe 1 mg  1 mg Endoscopically ONCE PRN       Social History:  Social History     Tobacco Use    Smoking status: Former Smoker     Packs/day: 1.00     Years: 10.00     Pack years: 10.00     Types: Cigarettes, Cigars     Quit date: 1978     Years since quittin.0    Smokeless tobacco: Former User   Substance Use Topics    Alcohol use: Not Currently     Alcohol/week: 3.0 standard drinks     Types: 3 Standard drinks or equivalent per week     Comment: no drinking in over 2 years       Family History:  Family History   Problem Relation Age of Onset   [address] [place] [address] Cancer Mother         leukemia    Celiac Disease Mother     Diabetes Father         age 58    Hypertension Father     Heart Disease Father     Celiac Disease Daughter     Heart Disease Maternal Grandmother              Review of Systems:      Constitutional: negative fever, negative chills, negative weight loss  Eyes:   negative visual changes  ENT:   negative sore throat, tongue or lip swelling  Respiratory:  negative cough, negative dyspnea  Cards:  negative for chest pain, palpitations, lower extremity edema  GI:   See HPI  :  negative for frequency, dysuria  Integument:  negative for rash and pruritus  Heme:  negative for easy bruising and gum/nose bleeding  Musculoskel: negative for myalgias,  back pain and muscle weakness  Neuro: negative for headaches, dizziness, vertigo  Psych:  negative for feelings of anxiety, depression       Objective:     Patient Vitals for the past 8 hrs:   BP Temp Pulse Resp SpO2 Height Weight   01/21/21 0758 (!) 161/91 97.8 °F (36.6 °C) 67 17 97 % 5' 7\" (1.702 m) 97.2 kg (214 lb 4.6 oz)     No intake/output data recorded. No intake/output data recorded. EXAM:     NEURO-a&o   HEENT-wnl   LUNGS-clear    COR-regular rate and rhythym     ABD-soft , no tenderness, no rebound, good bs     EXT-no edema     Data Review     No results for input(s): WBC, HGB, HCT, PLT, HGBEXT, HCTEXT, PLTEXT in the last 72 hours. No results for input(s): NA, K, CL, CO2, BUN, CREA, GLU, PHOS, CA in the last 72 hours. No results for input(s): AP, TBIL, TP, ALB, GLOB, GGT, AML, LPSE in the last 72 hours. No lab exists for component: SGOT, GPT, AMYP, HLPSE  No results for input(s): INR, PTP, APTT, INREXT in the last 72 hours. Patient Active Problem List   Diagnosis Code    HTN (hypertension) I10    Sinus bradycardia R00.1    AR (allergic rhinitis) J30.9    GERD (gastroesophageal reflux disease) K21.9    Hearing loss H91.90    UTI (urinary tract infection) N39.0    Onychomycosis B35.1    Right inguinal hernia K40.90    Elevated prostate specific antigen (PSA) R97.20    CLARICE (obstructive sleep apnea) G47.33    Bilateral inguinal hernia K40.20    Ankle fracture, left Q23.452X    Advanced care planning/counseling discussion Z71.89    Abnormal celiac antibody panel R89.4    Normal cardiac stress test GXI1986    Influenza vaccination declined Z28.21    Pulmonary nodule, right R91.1    Esophageal adenocarcinoma (HCC) C15.9    Cyst of left kidney N28.1    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      Signed By: Joshua Choudhury MD     1/21/2021  9:18 AM

## 2021-01-21 NOTE — ANESTHESIA PREPROCEDURE EVALUATION
Anesthetic History   No history of anesthetic complications            Review of Systems / Medical History  Patient summary reviewed, nursing notes reviewed and pertinent labs reviewed    Pulmonary        Sleep apnea: CPAP           Neuro/Psych         Psychiatric history    Comments: LLE parasthesia  Anxiety/depression Cardiovascular    Hypertension: well controlled            Pertinent negatives: Dysrhythmias: anton. Exercise tolerance: >4 METS     GI/Hepatic/Renal     GERD: poorly controlled      Hiatal hernia    Comments: Pool's esophagitis  Enlarged prostate Endo/Other        Obesity and arthritis     Other Findings              Physical Exam    Airway  Mallampati: II  TM Distance: 4 - 6 cm  Neck ROM: normal range of motion   Mouth opening: Normal     Cardiovascular  Regular rate and rhythm,  S1 and S2 normal,  no murmur, click, rub, or gallop  Rhythm: regular  Rate: normal         Dental    Dentition: Lower dentition intact and Upper dentition intact     Pulmonary  Breath sounds clear to auscultation               Abdominal         Other Findings            Anesthetic Plan    ASA: 2  Anesthesia type: MAC            Anesthetic plan and risks discussed with: Patient      Informed consent obtained.

## 2021-01-21 NOTE — DISCHARGE INSTRUCTIONS
Carrie Albright M.D.  [phone]           2021  Antonio Curtis  :  1945  [address] [place] [address] Medical Record Number:  [id number]        ENDOSCOPY FINDINGS:   Your endoscopy showed Pool's esophagus that was biopsied, no lesions or strictures were seen. EGD DISCHARGE INSTRUCTIONS    DISCOMFORT:  Sore throat- throat lozenges or warm salt water gargle  redness at IV site- apply warm compress to area; if redness or soreness persist- contact your physician  Gaseous discomfort- walking, belching will help relieve any discomfort  You may not operate a vehicle for 12 hours  You may not engage in an occupation involving machinery or appliances for rest of today  You may not drink alcoholic beverages for at least 12 hours  Avoid making any critical decisions for at least 24 hour    DIET:   You may resume your regular diet. ACTIVITY  Spend the remainder of the day resting -  avoid any strenuous activity. Avoid driving or operating machinery. CALL M.D. ANY SIGN OF   Increasing pain, nausea, vomiting  Abdominal distension (swelling)  New increased bleeding (oral or rectal)  Fever (chills)  Pain in chest area  Bloody discharge from nose or mouth  Shortness of breath    Follow-up Instructions:   Call Dr. Lake Mondragon for any questions or problems. Telephone # [phone]  Biopsies were obtained, the results will be available  in  5 to 7 days. We will call you to notify you of these results. Continue same medications. Repeat endoscopy based on biopsy results.

## 2021-01-21 NOTE — PROGRESS NOTES
Gio Haley  1945  [id number]    Situation:  Verbal report received from: Alfredo Delgadillo RN   Procedure: Procedure(s):  ESOPHAGOGASTRODUODENOSCOPY (EGD)  ESOPHAGOGASTRODUODENAL (EGD) BIOPSY    Background:    Preoperative diagnosis: BELLO'S ESOPHAGUS  Postoperative diagnosis: 1- Barretts Esophagus  2- Hiatal Hernia. :  Dr. Aba Edwards  Assistant(s): Endoscopy Technician-1: Peggy Alvarado  Endoscopy RN-1: Barry Calderon    Specimens:   ID Type Source Tests Collected by Time Destination   1 : 36cm Esphageal Biopsy Preservative   Radha Walden MD 1/21/2021 5879 Pathology   2 : 34cm Esphageal Biopsy Preservative   Radha Walden MD 1/21/2021 1238 Pathology   3 : 32cm Esophageal Biopsy. Jessica Tracy MD 1/21/2021 0930 Pathology   4 : 30cm Esophageal Biopsy Preservative   Radha Walden MD 1/21/2021 1836 Pathology     H. Pylori  no    Assessment:    Anesthesia gave intra-procedure sedation and medications, see anesthesia flow sheet no    Intravenous fluids: NS@ KVO     Vital signs stable     Abdominal assessment: round and soft     Recommendation:  Discharge patient per MD order.   Return to floor  Family or Friend   Permission to share finding with family or friend yes

## 2021-01-21 NOTE — PROCEDURES
Selene Cortez M.D.  [phone]           2021                EGD Operative Report  Edgard Reyna  :  1945  [facility] Medical Record Number:  [id number]      Indication:  Pool's/esophageal ulcer     : Elvis Beth MD    Referring Provider:  Otoniel Ernst MD      Anesthesia/Sedation:  MAC anesthesia    Airway assessment: No airway problems anticipated    Pre-Procedural Exam:      Airway: clear, no airway problems anticipated  Heart: RRR, without gallops or rubs  Lungs: clear bilaterally without wheezes, crackles, or rhonchi  Abdomen: soft, nontender, nondistended, bowel sounds present  Mental Status: awake, alert and oriented to person, place and time       Procedure Details     After infomed consent was obtained for the procedure, with all risks and benefits of procedure explained the patient was taken to the endoscopy suite and placed in the left lateral decubitus position. Following sequential administration of sedation as per above, the endoscope was inserted into the mouth and advanced under direct vision to second portion of the duodenum. A careful inspection was made as the gastroscope was withdrawn, including a retroflexed view of the proximal stomach; findings and interventions are described below. Findings:   Esophagus:Evidence of salmon colored mucosa noted in the distal esophagus starting around 36 cm and up to 30 cm from the gums as few islands and one wide tongue, Kinston Classification C0M4. On blue light, no ulcers or nodules or suspicious lesions were seen. No post-RFA stricture seen. Stomach: Small hiatal hernia noted, otherwise mucosa within normal  Duodenum/jejunum: normal    Therapies:  none    Specimens: Four quadrant biopsies were obtained every 2 cm starting at 36 cm and up to 30 cm            Complications:   None; patient tolerated the procedure well. EBL:  None.            Impression:    1- Barretts Esophagus 2- Hiatal Hernia.          Recommendations:    -Continue acid suppression.  -Await pathology.  -Continue with strict anti-reflux measures.  -Follow-up office visit    Felisa Light MD

## 2021-01-21 NOTE — PERIOP NOTES
Received report from Carrie Henson CRNA. See anesthesia record. Patient taken to post-recovery. Post-recovery report given to Ilan Haque RN. Patient's ABD remains soft and non-tender post procedure. Pt has no complaints at this time and tolerated the procedure well. Endoscope was pre-cleaned at bedside immediately following procedure by Krystal Lucio.

## 2021-01-21 NOTE — ANESTHESIA POSTPROCEDURE EVALUATION
Procedure(s):  ESOPHAGOGASTRODUODENOSCOPY (EGD)  ESOPHAGOGASTRODUODENAL (EGD) BIOPSY. MAC    Anesthesia Post Evaluation      Multimodal analgesia: multimodal analgesia used between 6 hours prior to anesthesia start to PACU discharge  Patient location during evaluation: PACU  Patient participation: complete - patient participated  Level of consciousness: awake and alert  Pain management: adequate  Airway patency: patent  Anesthetic complications: no  Cardiovascular status: acceptable  Respiratory status: acceptable  Hydration status: acceptable  Post anesthesia nausea and vomiting:  none      INITIAL Post-op Vital signs:   Vitals Value Taken Time   /92 01/21/21 0959   Temp 36.7 °C (98 °F) 01/21/21 0944   Pulse 56 01/21/21 1000   Resp 19 01/21/21 1000   SpO2 98 % 01/21/21 1000   Vitals shown include unvalidated device data.

## 2021-01-26 ENCOUNTER — OFFICE VISIT (OUTPATIENT)
Dept: INTERNAL MEDICINE CLINIC | Age: 76
End: 2021-01-26
Payer: MEDICARE

## 2021-01-26 VITALS
HEIGHT: 67 IN | DIASTOLIC BLOOD PRESSURE: 93 MMHG | BODY MASS INDEX: 32.96 KG/M2 | OXYGEN SATURATION: 96 % | RESPIRATION RATE: 13 BRPM | TEMPERATURE: 97.6 F | HEART RATE: 77 BPM | WEIGHT: 210 LBS | SYSTOLIC BLOOD PRESSURE: 152 MMHG

## 2021-01-26 DIAGNOSIS — Z00.00 MEDICARE ANNUAL WELLNESS VISIT, SUBSEQUENT: Primary | ICD-10-CM

## 2021-01-26 DIAGNOSIS — C15.9 ESOPHAGEAL ADENOCARCINOMA (HCC): ICD-10-CM

## 2021-01-26 DIAGNOSIS — E55.9 VITAMIN D DEFICIENCY: ICD-10-CM

## 2021-01-26 DIAGNOSIS — I10 ESSENTIAL HYPERTENSION: ICD-10-CM

## 2021-01-26 DIAGNOSIS — R91.1 RIGHT LOWER LOBE PULMONARY NODULE: ICD-10-CM

## 2021-01-26 DIAGNOSIS — R06.09 DOE (DYSPNEA ON EXERTION): ICD-10-CM

## 2021-01-26 DIAGNOSIS — R97.20 ELEVATED PROSTATE SPECIFIC ANTIGEN (PSA): ICD-10-CM

## 2021-01-26 DIAGNOSIS — R10.2 SUPRAPUBIC PAIN: ICD-10-CM

## 2021-01-26 PROCEDURE — G8536 NO DOC ELDER MAL SCRN: HCPCS | Performed by: INTERNAL MEDICINE

## 2021-01-26 PROCEDURE — G8427 DOCREV CUR MEDS BY ELIG CLIN: HCPCS | Performed by: INTERNAL MEDICINE

## 2021-01-26 PROCEDURE — 1101F PT FALLS ASSESS-DOCD LE1/YR: CPT | Performed by: INTERNAL MEDICINE

## 2021-01-26 PROCEDURE — G8417 CALC BMI ABV UP PARAM F/U: HCPCS | Performed by: INTERNAL MEDICINE

## 2021-01-26 PROCEDURE — G8753 SYS BP > OR = 140: HCPCS | Performed by: INTERNAL MEDICINE

## 2021-01-26 PROCEDURE — G8432 DEP SCR NOT DOC, RNG: HCPCS | Performed by: INTERNAL MEDICINE

## 2021-01-26 PROCEDURE — 99214 OFFICE O/P EST MOD 30 MIN: CPT | Performed by: INTERNAL MEDICINE

## 2021-01-26 PROCEDURE — G0439 PPPS, SUBSEQ VISIT: HCPCS | Performed by: INTERNAL MEDICINE

## 2021-01-26 PROCEDURE — G0463 HOSPITAL OUTPT CLINIC VISIT: HCPCS | Performed by: INTERNAL MEDICINE

## 2021-01-26 PROCEDURE — 3017F COLORECTAL CA SCREEN DOC REV: CPT | Performed by: INTERNAL MEDICINE

## 2021-01-26 PROCEDURE — G8755 DIAS BP > OR = 90: HCPCS | Performed by: INTERNAL MEDICINE

## 2021-01-26 RX ORDER — IRBESARTAN 300 MG/1
300 TABLET ORAL
Qty: 90 TAB | Refills: 1 | Status: SHIPPED | OUTPATIENT
Start: 2021-01-26 | End: 2021-07-25

## 2021-01-26 RX ORDER — ACETAMINOPHEN 500 MG
2000 TABLET ORAL DAILY
Qty: 30 CAP | Refills: 5
Start: 2021-01-26

## 2021-01-26 NOTE — PROGRESS NOTES
HISTORY OF PRESENT ILLNESS    Chief Complaint   Patient presents with    Annual Wellness Visit     Some lower left abdomen pain.  Labs     Fasting. Presents for follow-up    Esophageal cancer. Intramural 5/2020. S/p ablations x3, last 9/2020. EGD 1/2021 showed no cancer. Reports some dyspnea on exertion. This has been ongoing for a few months. He denies any significant cough. Denies any chest pain. History of stress test in 2017 which was normal.  Chest CT done July 14, 2020 showed a 4 mm right lower lobe pleural based nodule, new since August 2018. Hypertension - reports BP is averaging a bit higher. Hypertension ROS: taking medications as instructed, no medication side effects noted, no TIA's, no chest pain on exertion, no, no swelling of ankles     reports that he quit smoking about 43 years ago. His smoking use included cigarettes and cigars. He has a 10.00 pack-year smoking history. He has quit using smokeless tobacco.    reports previous alcohol use of about 3.0 standard drinks of alcohol per week. BP Readings from Last 2 Encounters:   01/26/21 (!) 152/93   01/21/21 (!) 154/92     Reports some suprapubic discomfort for about a couple of months. History of limited PSA and incomplete bladder emptying. Pain is not significantly worse but he does report that he had an episode of urinary retention after an endoscopy last fall.   Wt Readings from Last 3 Encounters:   01/26/21 210 lb (95.3 kg)   01/21/21 214 lb 4.6 oz (97.2 kg)   09/02/20 206 lb 12.7 oz (93.8 kg)       Review of Systems   All other systems reviewed and are negative, except as noted in HPI    Past Medical and Surgical History   has a past medical history of Abnormal celiac antibody panel (01/2017), Adverse effect of anesthesia (09/2020), Ankle fracture, left (6/29/15), AR (allergic rhinitis), Arthritis, Pool esophagus, Cyst of left kidney, Elevated prostate specific antigen (PSA), Enlarged prostate, Esophageal adenocarcinoma ([facility] [address] (05/2020), Esophageal motility disorder (01/2017), GERD (gastroesophageal reflux disease), Hearing loss, HTN (hypertension), Kidney cysts, Left leg paresthesias, Normal cardiac stress test (03/07/2017), Onychomycosis, CLARICE (obstructive sleep apnea) (2003), Panic attacks (2002), Pleurisy, Pulmonary nodule, right (07/14/2020), Right inguinal hernia, Sinus bradycardia, Squamous cell carcinoma (1989), and UTI (urinary tract infection) (04/2008). He also has no past medical history of Diabetes ([facility] [address] Difficult intubation, Malignant hyperthermia due to anesthesia, Nausea & vomiting, or Pseudocholinesterase deficiency. has a past surgical history that includes colonoscopy (2002); prostate biopsies (2006, 2008); hx colonoscopy (10/3/14); hx endoscopy (10/02/2018); hx endoscopy (05/21/2018); hx endoscopy (05/15/2017); hx endoscopy (10/02/2014); hx tonsillectomy; hx hernia repair (Bilateral, 12/19/2014); hx ankle fracture tx (Left, 6/29/15); hx other surgical (); hx urological (2005 2008); hx endoscopy (01/21/2021); and hx endoscopy (09/02/2020). reports that he quit smoking about 43 years ago. His smoking use included cigarettes and cigars. He has a 10.00 pack-year smoking history. He has quit using smokeless tobacco. He reports previous alcohol use of about 3.0 standard drinks of alcohol per week. He reports that he does not use drugs. family history includes Cancer in his mother; Celiac Disease in his daughter and mother; Diabetes in his father; Heart Disease in his father and maternal grandmother; Hypertension in his father. Physical Exam   Nursing note and vitals reviewed. Blood pressure (!) 152/93, pulse 77, temperature 97.6 °F (36.4 °C), temperature source Oral, resp. rate 13, height 5' 7\" (1.702 m), weight 210 lb (95.3 kg), SpO2 96 %. Constitutional:  No distress. Eyes: Conjunctivae are normal.   Ears:  Hearing grossly intact  Cardiovascular: Normal rate.   regular rhythm, no murmurs or gallops  No edema  Pulmonary/Chest: Effort normal.   CTAB  Musculoskeletal: moves all 4 extremities   Neurological: Alert and oriented to person, place, and time. Skin: No visible rash noted. Psychiatric: Normal mood and affect. Behavior is normal.     ASSESSMENT and PLAN  Diagnoses and all orders for this visit:    2. Esophageal adenocarcinoma ([facility] [address]  Fortunately, this appeared to be very localized in May 2020 and no evidence of recurrence on recent endoscopies. 3. Essential hypertension  Pressure is uncontrolled. Increase irbesartan. Continue to monitor.  -     irbesartan (AVAPRO) 300 mg tablet; Take 1 Tab by mouth nightly. Increased 1/26/21  -     CBC WITH AUTOMATED DIFF; Future  -     METABOLIC PANEL, COMPREHENSIVE; Future  -     LIPID PANEL; Future    4. DOYLE (dyspnea on exertion)  I am concerned about this symptom. Chest CT. CBC to rule out anemia. Need to consider repeat cardiac work-up. Deconditioning is also possible. -     CBC WITH AUTOMATED DIFF; Future    5. Right lower lobe pulmonary nodule   Seen on CT by 2020 at urology. New dyspnea on exertion and cancer diagnosis, will repeat now. Was a small nodule which will not cause any any symptoms at that time.  -     CT CHEST WO CONT; Future    6. Elevated prostate specific antigen (PSA)      7. Vitamin D deficiency  -     cholecalciferol (VITAMIN D3) (2,000 UNITS /50 MCG) cap capsule; Take 1 Cap by mouth daily. 8. Suprapubic pain  Urinalysis. History of urinary retention. This may be bladder spasm? Follow-up with urology if urinalysis is normal.  -     URINALYSIS W/ RFLX MICROSCOPIC; Future      lab results and schedule of future lab studies reviewed with patient  reviewed medications and side effects in detail    Return to clinic for further evaluation if new symptoms develop        Current Outpatient Medications   Medication Sig    irbesartan (AVAPRO) 300 mg tablet Take 1 Tab by mouth nightly.  Increased 1/26/21    cholecalciferol (VITAMIN D3) (2,000 UNITS /50 MCG) cap capsule Take 1 Cap by mouth daily.  chlorpheniramine (CHLOR-TRIMETRON) 4 mg tablet Take 4 mg by mouth every six (6) hours as needed for Allergies.  acetaminophen (TYLENOL) 500 mg tablet Take 2 Tabs by mouth every six (6) hours as needed for Pain or Fever.  ibuprofen (MOTRIN) 800 mg tablet Take 1 Tab by mouth every six (6) hours as needed for Pain (Fever).  omeprazole (PRILOSEC) 40 mg capsule TK 1 C PO BID    tamsulosin (FLOMAX) 0.4 mg capsule Take 0.4 mg by mouth daily.  SAW PALMETTO PO Take 1 Tab by mouth two (2) times a day.  cpap machine kit by Does Not Apply route. No current facility-administered medications for this visit.

## 2021-01-26 NOTE — PROGRESS NOTES
This is a Subsequent Medicare Annual Wellness Visit providing Personalized Prevention Plan Services (PPPS) (Performed 12 months after initial AWV and PPPS )    I have reviewed the patient's medical history in detail and updated the computerized patient record. History     Past Medical History:   Diagnosis Date    Abnormal celiac antibody panel 01/2017    Adverse effect of anesthesia 09/2020    could not urinate post procedure - catheter for over a week    Ankle fracture, left 6/29/15    Dr. Abimael Price. and prox fibula. s/p surgery    AR (allergic rhinitis)     Arthritis     ritesh hands   [place], Dr. Robert Hernandez. EGD 5/2020 intramural adenocarcinoma. repeat endoscopy 5/2018 (low grade). Dr. Elder Labrum Chronic sinus complaints     Cyst of left kidney     left. stable mid-pole cyst 7/2020    Elevated prostate specific antigen (PSA)     Dr. Charmayne Pavy. Dr. Daugherty Box peak 14.3, bx 5/2008, 2006    Enlarged prostate     Esophageal adenocarcinoma ([facility] [address] 05/2020    intramural, non-invasive. non-virualed seen on biopsies. s/p albations w HALO 5/2020, 6/2020, 9/2020    Esophageal motility disorder 01/2017    +hiatal hernia    GERD (gastroesophageal reflux disease)     Hearing loss     hearing test 4/2009- bilat.  HTN (hypertension)     diet treated, stress echo 12/07 nl    Kidney cysts     left. s/p biopsy 2018 Dr. Almeta Crigler Left leg paresthesias     medical left leg. since left leg fracuture 6/2015    Normal cardiac stress test 03/07/2017    Onychomycosis     CLARICE (obstructive sleep apnea) 2003    Dr. Colonel Gibbs. wears CPAP at night setting 8.5    Panic attacks 2002    work-related    Pleurisy     Pulmonary nodule, right 07/14/2020    4 mm RLL pleural based nodule. CT 7[facility]20 [facility] new compared to 8/13/18.     Right inguinal hernia     Sinus bradycardia     Squamous cell carcinoma 1989    left temple Dr. Spring Ibrahim    UTI (urinary tract infection) 04/2008       Past Surgical History:   Procedure Laterality Date    COLONOSCOPY  2002    normal except hemorrhoids    HX ANKLE FRACTURE TX Left 6/29/15    Dr. Silvana Katz    HX COLONOSCOPY  10/3/14    1 hyperplastic polyp. Milka    HX ENDOSCOPY  10/02/2018    HX ENDOSCOPY  05/21/2018    HX ENDOSCOPY  05/15/2017    HX ENDOSCOPY  10/02/2014    HX ENDOSCOPY  01/21/2021    HX ENDOSCOPY  09/02/2020    HX HERNIA REPAIR Bilateral 12/19/2014    Dr. Tete Michele OTHER SURGICAL      Laparoscopic bilateral inguinal hernia repair with mesh    HX TONSILLECTOMY      HX UROLOGICAL  2005 2008    prostate biopsy x 2- benign    PROSTATE BIOPSIES  2006, 2008    saw Dr. Sofi Preston. Now Dr. Marian Hoyos       Current Outpatient Medications   Medication Sig    irbesartan (AVAPRO) 300 mg tablet Take 1 Tab by mouth nightly. Increased 1/26/21    cholecalciferol (VITAMIN D3) (2,000 UNITS /50 MCG) cap capsule Take 1 Cap by mouth daily.  chlorpheniramine (CHLOR-TRIMETRON) 4 mg tablet Take 4 mg by mouth every six (6) hours as needed for Allergies.  acetaminophen (TYLENOL) 500 mg tablet Take 2 Tabs by mouth every six (6) hours as needed for Pain or Fever.  ibuprofen (MOTRIN) 800 mg tablet Take 1 Tab by mouth every six (6) hours as needed for Pain (Fever).  omeprazole (PRILOSEC) 40 mg capsule TK 1 C PO BID    tamsulosin (FLOMAX) 0.4 mg capsule Take 0.4 mg by mouth daily.  SAW PALMETTO PO Take 1 Tab by mouth two (2) times a day.  cpap machine kit by Does Not Apply route. No current facility-administered medications for this visit. No Known Allergies    Family History   Problem Relation Age of Onset   [facility] Cancer Mother         leukemia    Celiac Disease Mother     Diabetes Father         age 58    Hypertension Father     Heart Disease Father     Celiac Disease Daughter     Heart Disease Maternal Grandmother         reports that he quit smoking about 43 years ago. His smoking use included cigarettes and cigars.  He has a 10.00 pack-year smoking history. He has quit using smokeless tobacco.   reports previous alcohol use of about 3.0 standard drinks of alcohol per week. Depression Risk Factor Screening:       Alcohol Risk Factor Screening: On any occasion during the past 3 months, have you had more than 3 drinks containing alcohol? No    Do you average more than 14 drinks per week? No      Functional Ability and Level of Safety:     Hearing Loss   mild    Activities of Daily Living   Self-care. Requires assistance with: no ADLs    Fall Risk     Fall Risk Assessment, last 12 mths 8/10/2020   Able to walk? Yes   Fall in past 12 months? No   Number of falls in past 12 months -   Fall with injury? -         Abuse Screen   Patient is not abused    Review of Systems   A comprehensive review of systems was negative except for that written in the HPI. Physical Examination     Evaluation of Cognitive Function:  Mood/affect:  neutral, happy  Appearance: age appropriate  Family member/caregiver input: none    Blood pressure (!) 152/93, pulse 77, temperature 97.6 °F (36.4 °C), temperature source Oral, resp. rate 13, height 5' 7\" (1.702 m), weight 210 lb (95.3 kg), SpO2 96 %. General appearance: alert, cooperative, no distress, appears stated age  Neck: supple, symmetrical, trachea midline, no adenopathy, thyroid: not enlarged, symmetric, no tenderness/mass/nodules, no carotid bruit and no JVD  Lungs: clear to auscultation bilaterally  Heart: regular rate and rhythm, S1, S2 normal, no murmur, click, rub or gallop  Extremities: extremities normal, atraumatic, no cyanosis or edema    Patient Care Team:  Marva Coppola MD as PCP - [facility], Agustina Zavala MD as PCP - [facility] Empaneled Provider  Jhon Strange MD as Physician (Sleep Medicine)      Advice/Referrals/Counseling   Education and counseling provided. See below for specific orders    Assessment/Plan   Diagnoses and all orders for this visit:    1.  Medicare annual wellness visit, subsequent      Potential medication side effects were discussed with the patient; let me know if any occur.   Return for yearly Annual Wellness Visits

## 2021-01-27 LAB
ALBUMIN SERPL-MCNC: 3.5 G/DL (ref 3.5–5)
ALBUMIN/GLOB SERPL: 1 {RATIO} (ref 1.1–2.2)
ALP SERPL-CCNC: 142 U/L (ref 45–117)
ALT SERPL-CCNC: 28 U/L (ref 12–78)
ANION GAP SERPL CALC-SCNC: 5 MMOL/L (ref 5–15)
APPEARANCE UR: CLEAR
AST SERPL-CCNC: 25 U/L (ref 15–37)
BACTERIA URNS QL MICRO: NEGATIVE /HPF
BASOPHILS # BLD: 0 K/UL (ref 0–0.1)
BASOPHILS NFR BLD: 1 % (ref 0–1)
BILIRUB SERPL-MCNC: 0.6 MG/DL (ref 0.2–1)
BILIRUB UR QL: NEGATIVE
BUN SERPL-MCNC: 14 MG/DL (ref 6–20)
BUN/CREAT SERPL: 15 (ref 12–20)
CALCIUM SERPL-MCNC: 8.9 MG/DL (ref 8.5–10.1)
CHLORIDE SERPL-SCNC: 107 MMOL/L (ref 97–108)
CHOLEST SERPL-MCNC: 161 MG/DL
CO2 SERPL-SCNC: 28 MMOL/L (ref 21–32)
COLOR UR: ABNORMAL
CREAT SERPL-MCNC: 0.95 MG/DL (ref 0.7–1.3)
DIFFERENTIAL METHOD BLD: NORMAL
EOSINOPHIL # BLD: 0.1 K/UL (ref 0–0.4)
EOSINOPHIL NFR BLD: 2 % (ref 0–7)
EPITH CASTS URNS QL MICRO: ABNORMAL /LPF
ERYTHROCYTE [DISTWIDTH] IN BLOOD BY AUTOMATED COUNT: 13.2 % (ref 11.5–14.5)
GLOBULIN SER CALC-MCNC: 3.5 G/DL (ref 2–4)
GLUCOSE SERPL-MCNC: 82 MG/DL (ref 65–100)
GLUCOSE UR STRIP.AUTO-MCNC: NEGATIVE MG/DL
HCT VFR BLD AUTO: 43.6 % (ref 36.6–50.3)
HDLC SERPL-MCNC: 57 MG/DL
HDLC SERPL: 2.8 {RATIO} (ref 0–5)
HGB BLD-MCNC: 14.2 G/DL (ref 12.1–17)
HGB UR QL STRIP: ABNORMAL
HYALINE CASTS URNS QL MICRO: ABNORMAL /LPF (ref 0–5)
IMM GRANULOCYTES # BLD AUTO: 0 K/UL (ref 0–0.04)
IMM GRANULOCYTES NFR BLD AUTO: 0 % (ref 0–0.5)
KETONES UR QL STRIP.AUTO: NEGATIVE MG/DL
LDLC SERPL CALC-MCNC: 93.4 MG/DL (ref 0–100)
LEUKOCYTE ESTERASE UR QL STRIP.AUTO: NEGATIVE
LIPID PROFILE,FLP: NORMAL
LYMPHOCYTES # BLD: 1.2 K/UL (ref 0.8–3.5)
LYMPHOCYTES NFR BLD: 20 % (ref 12–49)
MCH RBC QN AUTO: 30.2 PG (ref 26–34)
MCHC RBC AUTO-ENTMCNC: 32.6 G/DL (ref 30–36.5)
MCV RBC AUTO: 92.8 FL (ref 80–99)
MONOCYTES # BLD: 0.6 K/UL (ref 0–1)
MONOCYTES NFR BLD: 10 % (ref 5–13)
NEUTS SEG # BLD: 4.1 K/UL (ref 1.8–8)
NEUTS SEG NFR BLD: 67 % (ref 32–75)
NITRITE UR QL STRIP.AUTO: NEGATIVE
NRBC # BLD: 0 K/UL (ref 0–0.01)
NRBC BLD-RTO: 0 PER 100 WBC
PH UR STRIP: 6 [PH] (ref 5–8)
PLATELET # BLD AUTO: 199 K/UL (ref 150–400)
PMV BLD AUTO: 10.3 FL (ref 8.9–12.9)
POTASSIUM SERPL-SCNC: 4.6 MMOL/L (ref 3.5–5.1)
PROT SERPL-MCNC: 7 G/DL (ref 6.4–8.2)
PROT UR STRIP-MCNC: NEGATIVE MG/DL
RBC # BLD AUTO: 4.7 M/UL (ref 4.1–5.7)
RBC #/AREA URNS HPF: ABNORMAL /HPF (ref 0–5)
SODIUM SERPL-SCNC: 140 MMOL/L (ref 136–145)
SP GR UR REFRACTOMETRY: 1.02 (ref 1–1.03)
TRIGL SERPL-MCNC: 53 MG/DL (ref ?–150)
UROBILINOGEN UR QL STRIP.AUTO: 0.2 EU/DL (ref 0.2–1)
VLDLC SERPL CALC-MCNC: 10.6 MG/DL
WBC # BLD AUTO: 6.1 K/UL (ref 4.1–11.1)
WBC URNS QL MICRO: ABNORMAL /HPF (ref 0–4)

## 2021-02-03 ENCOUNTER — HOSPITAL ENCOUNTER (OUTPATIENT)
Dept: CT IMAGING | Age: 76
Discharge: HOME | End: 2021-02-03
Attending: INTERNAL MEDICINE
Payer: MEDICARE

## 2021-02-03 DIAGNOSIS — R91.1 RIGHT LOWER LOBE PULMONARY NODULE: ICD-10-CM

## 2021-02-03 PROCEDURE — 71250 CT THORAX DX C-: CPT

## 2021-03-23 NOTE — PERIOP NOTES
[address] [place] [address] Dr Moreno Preprocedure Instructions      1. On the day of your surgery, please report to registration located on the 2nd floor of the  [facility]. yes    2. You must have a responsible adult to drive you to the hospital, stay at the hospital during your procedure and drive you home. If they leave your procedure will not be started (no exceptions). yes    3. Do not have anything to eat or drink (including water, gum, mints, coffee, and juice) after midnight. This does not apply to the medications you were instructed to take by your physician. yesIf you are currently taking Plavix, Coumadin, Aspirin, or other blood-thinning agents, contact your physician for special instructions. yes,    4. If you are having a procedure that requires bowel prep: We recommend that you have only clear liquids the day before your procedure and begin your bowel prep by 5:00 pm.  You may continue to drink clear liquids until midnight. If for any reason you are not able to complete your prep please notify your physician immediately. not applicable    5. Have a list of all current medications, including vitamins, herbal supplements and any other over the counter medications. yes    6. If you wear glasses, contacts, dentures and/or hearing aids, they may be removed prior to procedure, please bring a case to store them in. yes    7. You should understand that if you do not follow these instructions your procedure may be cancelled. If your physical condition changes (I.e. fever, cold or flu) please contact your doctor as soon as possible. 8. It is important that you be on time.   If for any reason you are unable to keep your appointment please call )- the day of or your physicians office prior to your scheduled procedure        Patient is aware of COVID swab for 3/28/2021

## 2021-03-28 ENCOUNTER — HOSPITAL ENCOUNTER (OUTPATIENT)
Dept: LAB | Age: 76
Discharge: HOME | End: 2021-03-28
Payer: MEDICARE

## 2021-03-28 ENCOUNTER — TRANSCRIBE ORDER (OUTPATIENT)
Dept: EMERGENCY DEPT | Age: 76
End: 2021-03-28

## 2021-03-28 DIAGNOSIS — Z01.812 PRE-PROCEDURAL LABORATORY EXAMINATIONS: Primary | ICD-10-CM

## 2021-03-28 DIAGNOSIS — Z01.812 PRE-PROCEDURAL LABORATORY EXAMINATIONS: ICD-10-CM

## 2021-03-28 PROCEDURE — U0003 INFECTIOUS AGENT DETECTION BY NUCLEIC ACID (DNA OR RNA); SEVERE ACUTE RESPIRATORY SYNDROME CORONAVIRUS 2 (SARS-COV-2) (CORONAVIRUS DISEASE [COVID-19]), AMPLIFIED PROBE TECHNIQUE, MAKING USE OF HIGH THROUGHPUT TECHNOLOGIES AS DESCRIBED BY CMS-2020-01-R: HCPCS

## 2021-03-29 LAB — SARS-COV-2, COV2NT: NOT DETECTED

## 2021-04-01 ENCOUNTER — ANESTHESIA EVENT (OUTPATIENT)
Dept: ENDOSCOPY | Age: 76
End: 2021-04-01
Payer: MEDICARE

## 2021-04-01 ENCOUNTER — ANESTHESIA (OUTPATIENT)
Dept: ENDOSCOPY | Age: 76
End: 2021-04-01
Payer: MEDICARE

## 2021-04-01 ENCOUNTER — HOSPITAL ENCOUNTER (OUTPATIENT)
Age: 76
Setting detail: OUTPATIENT SURGERY
Discharge: HOME | End: 2021-04-01
Attending: INTERNAL MEDICINE | Admitting: INTERNAL MEDICINE
Payer: MEDICARE

## 2021-04-01 VITALS
WEIGHT: 213.19 LBS | RESPIRATION RATE: 17 BRPM | SYSTOLIC BLOOD PRESSURE: 139 MMHG | BODY MASS INDEX: 33.46 KG/M2 | HEART RATE: 51 BPM | OXYGEN SATURATION: 99 % | DIASTOLIC BLOOD PRESSURE: 79 MMHG | TEMPERATURE: 97.7 F | HEIGHT: 67 IN

## 2021-04-01 PROCEDURE — 76040000019: Performed by: INTERNAL MEDICINE

## 2021-04-01 PROCEDURE — 2709999900 HC NON-CHARGEABLE SUPPLY: Performed by: INTERNAL MEDICINE

## 2021-04-01 PROCEDURE — 77030021593 HC FCPS BIOP ENDOSC BSC -A: Performed by: INTERNAL MEDICINE

## 2021-04-01 PROCEDURE — 74011000250 HC RX REV CODE- 250: Performed by: NURSE ANESTHETIST, CERTIFIED REGISTERED

## 2021-04-01 PROCEDURE — 76060000031 HC ANESTHESIA FIRST 0.5 HR: Performed by: INTERNAL MEDICINE

## 2021-04-01 PROCEDURE — 74011250636 HC RX REV CODE- 250/636: Performed by: NURSE ANESTHETIST, CERTIFIED REGISTERED

## 2021-04-01 PROCEDURE — 88305 TISSUE EXAM BY PATHOLOGIST: CPT

## 2021-04-01 PROCEDURE — 74011250636 HC RX REV CODE- 250/636: Performed by: INTERNAL MEDICINE

## 2021-04-01 RX ORDER — PROPOFOL 10 MG/ML
INJECTION, EMULSION INTRAVENOUS
Status: DISCONTINUED | OUTPATIENT
Start: 2021-04-01 | End: 2021-04-01 | Stop reason: HOSPADM

## 2021-04-01 RX ORDER — LIDOCAINE HYDROCHLORIDE 20 MG/ML
INJECTION, SOLUTION EPIDURAL; INFILTRATION; INTRACAUDAL; PERINEURAL AS NEEDED
Status: DISCONTINUED | OUTPATIENT
Start: 2021-04-01 | End: 2021-04-01 | Stop reason: HOSPADM

## 2021-04-01 RX ORDER — DEXTROMETHORPHAN/PSEUDOEPHED 2.5-7.5/.8
1.2 DROPS ORAL
Status: DISCONTINUED | OUTPATIENT
Start: 2021-04-01 | End: 2021-04-01 | Stop reason: HOSPADM

## 2021-04-01 RX ORDER — MIDAZOLAM HYDROCHLORIDE 1 MG/ML
.25-5 INJECTION, SOLUTION INTRAMUSCULAR; INTRAVENOUS
Status: DISCONTINUED | OUTPATIENT
Start: 2021-04-01 | End: 2021-04-01 | Stop reason: HOSPADM

## 2021-04-01 RX ORDER — PROPOFOL 10 MG/ML
INJECTION, EMULSION INTRAVENOUS AS NEEDED
Status: DISCONTINUED | OUTPATIENT
Start: 2021-04-01 | End: 2021-04-01 | Stop reason: HOSPADM

## 2021-04-01 RX ORDER — NALOXONE HYDROCHLORIDE 0.4 MG/ML
0.4 INJECTION, SOLUTION INTRAMUSCULAR; INTRAVENOUS; SUBCUTANEOUS
Status: DISCONTINUED | OUTPATIENT
Start: 2021-04-01 | End: 2021-04-01 | Stop reason: HOSPADM

## 2021-04-01 RX ORDER — FLUMAZENIL 0.1 MG/ML
0.2 INJECTION INTRAVENOUS
Status: DISCONTINUED | OUTPATIENT
Start: 2021-04-01 | End: 2021-04-01 | Stop reason: HOSPADM

## 2021-04-01 RX ORDER — EPINEPHRINE 0.1 MG/ML
1 INJECTION INTRACARDIAC; INTRAVENOUS
Status: DISCONTINUED | OUTPATIENT
Start: 2021-04-01 | End: 2021-04-01 | Stop reason: HOSPADM

## 2021-04-01 RX ORDER — SODIUM CHLORIDE 9 MG/ML
INJECTION, SOLUTION INTRAVENOUS
Status: DISCONTINUED | OUTPATIENT
Start: 2021-04-01 | End: 2021-04-01 | Stop reason: HOSPADM

## 2021-04-01 RX ORDER — SODIUM CHLORIDE 9 MG/ML
50 INJECTION, SOLUTION INTRAVENOUS CONTINUOUS
Status: DISCONTINUED | OUTPATIENT
Start: 2021-04-01 | End: 2021-04-01 | Stop reason: HOSPADM

## 2021-04-01 RX ORDER — ATROPINE SULFATE 0.1 MG/ML
0.4 INJECTION INTRAVENOUS
Status: DISCONTINUED | OUTPATIENT
Start: 2021-04-01 | End: 2021-04-01 | Stop reason: HOSPADM

## 2021-04-01 RX ADMIN — LIDOCAINE HYDROCHLORIDE 60 MG: 20 INJECTION, SOLUTION INTRAVENOUS at 08:10

## 2021-04-01 RX ADMIN — SODIUM CHLORIDE: 9 INJECTION, SOLUTION INTRAVENOUS at 07:21

## 2021-04-01 RX ADMIN — PROPOFOL INJECTABLE EMULSION 100 MG: 10 INJECTION, EMULSION INTRAVENOUS at 08:11

## 2021-04-01 RX ADMIN — SODIUM CHLORIDE 50 ML/HR: 9 INJECTION, SOLUTION INTRAVENOUS at 07:35

## 2021-04-01 RX ADMIN — PROPOFOL INJECTABLE EMULSION 140 MCG/KG/MIN: 10 INJECTION, EMULSION INTRAVENOUS at 08:10

## 2021-04-01 NOTE — DISCHARGE INSTRUCTIONS
Rose Strauss M.D.  [phone]           2021  Genna Taylor  :  1945  [facility] Medical Record Number:  [id number]        ENDOSCOPY FINDINGS:   Your endoscopy showed a small size hiatal hernia and Pool's esophagus, biopsies were obtained. EGD DISCHARGE INSTRUCTIONS    DISCOMFORT:  Sore throat- throat lozenges or warm salt water gargle  redness at IV site- apply warm compress to area; if redness or soreness persist- contact your physician  Gaseous discomfort- walking, belching will help relieve any discomfort  You may not operate a vehicle for 12 hours  You may not engage in an occupation involving machinery or appliances for rest of today  You may not drink alcoholic beverages for at least 12 hours  Avoid making any critical decisions for at least 24 hour    DIET:   You may resume your regular diet. ACTIVITY  Spend the remainder of the day resting -  avoid any strenuous activity. Avoid driving or operating machinery. CALL M.D. ANY SIGN OF   Increasing pain, nausea, vomiting  Abdominal distension (swelling)  New increased bleeding (oral or rectal)  Fever (chills)  Pain in chest area  Bloody discharge from nose or mouth  Shortness of breath    Follow-up Instructions:   Call Dr. Slick Pugh for any questions or problems. Telephone # [phone]  Biopsies were obtained, the results will be available  in  5 to 7 days. We will call you to notify you of these results. Continue same medications.  Continue with anti-reflux measures

## 2021-04-01 NOTE — PROGRESS NOTES
Juhi Sender  1945  [id number]    Situation:  Verbal report received from: Juan F Light RN   Procedure: Procedure(s):  ESOPHAGOGASTRODUODENOSCOPY (EGD)  ESOPHAGOGASTRODUODENAL (EGD) BIOPSY    Background:    Preoperative diagnosis: BARRETTS ESOPHAGUS  Postoperative diagnosis: 1- Hiatal hernia  2- Barretts Esophagus. :  Dr. Radha Rico  Assistant(s): Endoscopy RN-1: Ness Abrams  Endoscopy RN-2: Samantha Parsons RN    Specimens:   ID Type Source Tests Collected by Time Destination   1 : 36cm Biopsy Preservative   Evelyn Phan MD 4/1/2021 3595 Pathology   2 : 34 cm Biopsy Preservative   Evelyn Phan MD 4/1/2021 0817 Pathology   3 : 32 cm Moises Vaughn MD 4/1/2021 0818 Pathology   4 : 30 cm Biopsy Preservative   Evelyn Phan MD 4/1/2021 8208 Pathology     H. Pylori  no    Assessment:    Anesthesia gave intra-procedure sedation and medications, see anesthesia flow sheet no    Intravenous fluids: NS@ KVO     Vital signs stable     Abdominal assessment: round and soft     Recommendation:  Discharge patient per MD order.   Return to floor  Family or Friend   Permission to share finding with family or friend yes

## 2021-04-01 NOTE — PROCEDURES
Zander Lizarraga M.D.  [phone]           2021                EGD Operative Report  Omaira Alcaraz  :  1945  [facility] Medical Record Number:  [id number]      Indication:  Polo's/esophageal ulcer     : Elnoria Cranker, MD    Referring Provider:  Sagar Thornton MD      Anesthesia/Sedation:  MAC anesthesia    Airway assessment: No airway problems anticipated    Pre-Procedural Exam:      Airway: clear, no airway problems anticipated  Heart: RRR, without gallops or rubs  Lungs: clear bilaterally without wheezes, crackles, or rhonchi  Abdomen: soft, nontender, nondistended, bowel sounds present  Mental Status: awake, alert and oriented to person, place and time       Procedure Details     After infomed consent was obtained for the procedure, with all risks and benefits of procedure explained the patient was taken to the endoscopy suite and placed in the left lateral decubitus position. Following sequential administration of sedation as per above, the endoscope was inserted into the mouth and advanced under direct vision to second portion of the duodenum. A careful inspection was made as the gastroscope was withdrawn, including a retroflexed view of the proximal stomach; findings and interventions are described below. Findings:   Esophagus:Evidence of salmon colored mucosa noted in the distal esophagus starting at 36 cm from the edge with the gastric folds and small hiatal hernia and extending upward to about 30 cm from the gums  Stomach: Small size hiatal hernia, otherwise mucosa within normal  Duodenum/jejunum: normal    Therapies:  none    Specimens: Four quadrant biopsies obtained at 36 cm, 34 cm, 32 cm, and 30 cm           Complications:   None; patient tolerated the procedure well. EBL:  None. Impression:    1- Hiatal hernia  2- Barretts Esophagus.        Recommendations:    -Continue acid suppression.  -Await pathology.  -Continue with anti-reflux measures    Brown Desir MD

## 2021-04-01 NOTE — ANESTHESIA POSTPROCEDURE EVALUATION
Procedure(s):  ESOPHAGOGASTRODUODENOSCOPY (EGD)  ESOPHAGOGASTRODUODENAL (EGD) BIOPSY. MAC    Anesthesia Post Evaluation      Multimodal analgesia: multimodal analgesia used between 6 hours prior to anesthesia start to PACU discharge  Patient location during evaluation: PACU  Patient participation: complete - patient participated  Level of consciousness: awake and alert  Pain management: adequate  Airway patency: patent  Anesthetic complications: no  Cardiovascular status: acceptable  Respiratory status: acceptable  Hydration status: acceptable  Post anesthesia nausea and vomiting:  none  Final Post Anesthesia Temperature Assessment:  Normothermia (36.0-37.5 degrees C)      INITIAL Post-op Vital signs:   Vitals Value Taken Time   /79 04/01/21 0852   Temp 36.5 °C (97.7 °F) 04/01/21 0827   Pulse 55 04/01/21 0854   Resp 15 04/01/21 0854   SpO2 97 % 04/01/21 0854   Vitals shown include unvalidated device data.

## 2021-04-01 NOTE — PERIOP NOTES
Received report from Mic Vaughn CRNA. See anesthesia record. Patient taken to post-recovery. Post-recovery report given to Mariaa Alcaraz RN. Patient's ABD remains soft and non-tender post procedure. Pt has no complaints at this time and tolerated the procedure well. Endoscope was pre-cleaned at bedside immediately following procedure by Isra Zayas.

## 2021-04-01 NOTE — PROGRESS NOTES
Endoscopy discharge instructions have been reviewed and given to patient. The patient verbalized understanding and acceptance of instructions. Dr. Priscilla Braga discussed with patient procedure findings and next steps.

## 2021-04-01 NOTE — H&P
Meliton Jama M.D.  [phone]            History and Physical       NAME:  Dorie Roldan   :   1945   MRN:   [id number]       Referring Physician:  Dr. Alonso Land Date: 2021 8:09 AM    Chief Complaint:  Pool's surveillance    History of Present Illness:  Patient is a 78 y. o. who is seen for Pool's surveillance, last endoscopy and biopsies showed areas indefinite for low-grade dysplasia. Denies any ongoing GI complaints. PMH:  Past Medical History:   Diagnosis Date    Abnormal celiac antibody panel 2017    Adverse effect of anesthesia 2020    could not urinate post procedure - catheter for over a week    Ankle fracture, left 6/29/15    Dr. Lizz Flores. and prox fibula. s/p surgery    AR (allergic rhinitis)     Arthritis     ritesh hands   Mishel Luis     Dr. Yen, Dr. Lexi Tyson. EGD 2020 intramural adenocarcinoma. repeat endoscopy 2018 (low grade). Dr. Christen Kussmaul Chronic sinus complaints     Cyst of left kidney     left. stable mid-pole cyst 2020    Elevated prostate specific antigen (PSA)     Dr. Tiffani Zuniga. Dr. Medellin Rang peak 14.3, bx 2008, 2006    Enlarged prostate     Esophageal adenocarcinoma ([facility] [address] 2020    intramural, non-invasive. non-virualed seen on biopsies. s/p albations w HALO 2020, 2020, 2020    Esophageal motility disorder 2017    +hiatal hernia    GERD (gastroesophageal reflux disease)     Hearing loss     hearing test 2009- bilat.  HTN (hypertension)     diet treated, stress echo  nl    Kidney cysts     left. s/p biopsy  Dr. Garcia Livers Left leg paresthesias     medical left leg. since left leg fracuture 2015    Normal cardiac stress test 2017    Onychomycosis     CLARICE (obstructive sleep apnea)     Dr. Ileana Quiroga. wears CPAP at night setting 8.5    Panic attacks 2002    work-related    Pleurisy     Pulmonary nodule, right 2020    4 mm RLL pleural based nodule.  CT 20 [place] [facility]. new compared to 8/13/18.  Right inguinal hernia     Sinus bradycardia     Squamous cell carcinoma 1989    left temple Dr. Tamie You    UTI (urinary tract infection) 04/2008       PSH:  Past Surgical History:   Procedure Laterality Date    COLONOSCOPY  2002    normal except hemorrhoids    HX ANKLE FRACTURE TX Left 6/29/15    Dr. Shaan Dudley    HX COLONOSCOPY  10/3/14    1 hyperplastic polyp. Milka    HX ENDOSCOPY  10/02/2018    HX ENDOSCOPY  05/21/2018    HX ENDOSCOPY  05/15/2017    HX ENDOSCOPY  10/02/2014    HX ENDOSCOPY  01/21/2021    HX ENDOSCOPY  09/02/2020    HX HERNIA REPAIR Bilateral 12/19/2014    Dr. Jiménez June OTHER SURGICAL      Laparoscopic bilateral inguinal hernia repair with mesh    HX TONSILLECTOMY      HX UROLOGICAL  2005 2008    prostate biopsy x 2- benign    PROSTATE BIOPSIES  2006, 2008    saw Dr. Rosales Earl. Now Dr. Alphonse Hyman       Allergies:  No Known Allergies    Home Medications:  Prior to Admission Medications   Prescriptions Last Dose Informant Patient Reported? Taking? SAW PALMETTO PO 4/1/2021 at Unknown time  Yes Yes   Sig: Take 1 Tab by mouth two (2) times a day. acetaminophen (TYLENOL) 500 mg tablet 3/25/2021 at Unknown time  No Yes   Sig: Take 2 Tabs by mouth every six (6) hours as needed for Pain or Fever. chlorpheniramine (CHLOR-TRIMETRON) 4 mg tablet 4/1/2021 at Unknown time  Yes Yes   Sig: Take 4 mg by mouth every six (6) hours as needed for Allergies. cholecalciferol (VITAMIN D3) (2,000 UNITS /50 MCG) cap capsule 4/1/2021 at Unknown time  No Yes   Sig: Take 1 Cap by mouth daily. cpap machine kit 4/1/2021 at Unknown time  Yes Yes   Sig: by Does Not Apply route. ibuprofen (MOTRIN) 800 mg tablet Unknown at Unknown time  No No   Sig: Take 1 Tab by mouth every six (6) hours as needed for Pain (Fever). irbesartan (AVAPRO) 300 mg tablet 3/31/2021 at Unknown time  No Yes   Sig: Take 1 Tab by mouth nightly.  Increased 1/26/21   omeprazole (PRILOSEC) 40 mg capsule 2021 at Unknown time  Yes Yes   Sig: TK 1 C PO BID   tamsulosin (FLOMAX) 0.4 mg capsule 2021 at Unknown time  Yes Yes   Sig: Take 0.4 mg by mouth daily.       Facility-Administered Medications: None       Hospital Medications:  Current Facility-Administered Medications   Medication Dose Route Frequency    0.9% sodium chloride infusion  50 mL/hr IntraVENous CONTINUOUS    midazolam (VERSED) injection 0.25-5 mg  0.25-5 mg IntraVENous Multiple    naloxone (NARCAN) injection 0.4 mg  0.4 mg IntraVENous Multiple    flumazeniL (ROMAZICON) 0.1 mg/mL injection 0.2 mg  0.2 mg IntraVENous Multiple    simethicone (MYLICON) 59RI/1.9JX oral drops 80 mg  1.2 mL Oral Multiple    atropine injection 0.4 mg  0.4 mg IntraVENous ONCE PRN    EPINEPHrine (ADRENALIN) 0.1 mg/mL syringe 1 mg  1 mg Endoscopically ONCE PRN     Facility-Administered Medications Ordered in Other Encounters   Medication Dose Route Frequency    0.9% sodium chloride infusion   IntraVENous CONTINUOUS       Social History:  Social History     Tobacco Use    Smoking status: Former Smoker     Packs/day: 1.00     Years: 10.00     Pack years: 10.00     Types: Cigarettes, Cigars     Quit date: 1978     Years since quittin.2    Smokeless tobacco: Former User   Substance Use Topics    Alcohol use: Not Currently     Alcohol/week: 3.0 standard drinks     Types: 3 Standard drinks or equivalent per week     Comment: no drinking in over 2 years       Family History:  Family History   Problem Relation Age of Onset    Cancer Mother         leukemia    Celiac Disease Mother     Diabetes Father         age 58    Hypertension Father     Heart Disease Father     Celiac Disease Daughter     Heart Disease Maternal Grandmother              Review of Systems:      Constitutional: negative fever, negative chills, negative weight loss  Eyes:   negative visual changes  ENT:   negative sore throat, tongue or lip swelling  Respiratory: negative cough, negative dyspnea  Cards:  negative for chest pain, palpitations, lower extremity edema  GI:   See HPI  :  negative for frequency, dysuria  Integument:  negative for rash and pruritus  Heme:  negative for easy bruising and gum/nose bleeding  Musculoskel: negative for myalgias,  back pain and muscle weakness  Neuro: negative for headaches, dizziness, vertigo  Psych:  negative for feelings of anxiety, depression       Objective:     Patient Vitals for the past 8 hrs:   BP Temp Pulse Resp SpO2 Height Weight   04/01/21 0722 (!) 179/82 97.2 °F (36.2 °C) (!) 43 16 97 % 5' 7\" (1.702 m) 96.7 kg (213 lb 3 oz)     No intake/output data recorded. No intake/output data recorded. EXAM:     NEURO-a&o   HEENT-wnl   LUNGS-clear    COR-regular rate and rhythym     ABD-soft , no tenderness, no rebound, good bs     EXT-no edema     Data Review     No results for input(s): WBC, HGB, HCT, PLT, HGBEXT, HCTEXT, PLTEXT in the last 72 hours. No results for input(s): NA, K, CL, CO2, BUN, CREA, GLU, PHOS, CA in the last 72 hours. No results for input(s): AP, TBIL, TP, ALB, GLOB, GGT, AML, LPSE in the last 72 hours. No lab exists for component: SGOT, GPT, AMYP, HLPSE  No results for input(s): INR, PTP, APTT, INREXT in the last 72 hours.     Patient Active Problem List   Diagnosis Code    HTN (hypertension) I10    Sinus bradycardia R00.1    AR (allergic rhinitis) J30.9    GERD (gastroesophageal reflux disease) K21.9    Hearing loss H91.90    UTI (urinary tract infection) N39.0    Onychomycosis B35.1    Right inguinal hernia K40.90    Elevated prostate specific antigen (PSA) R97.20    CLARICE (obstructive sleep apnea) G47.33    Bilateral inguinal hernia K40.20    Ankle fracture, left Z29.394L    Advanced care planning/counseling discussion Z71.89    Abnormal celiac antibody panel R89.4    Normal cardiac stress test [id number]    Influenza vaccination declined Z28.21    Pulmonary nodule, right R91.1    Esophageal adenocarcinoma (HCC) C15.9    Cyst of left kidney N28.1    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      Signed By: Darell Alves MD     4/1/2021  8:09 AM

## 2021-05-06 LAB — PSA, EXTERNAL: 4.6

## 2021-07-24 DIAGNOSIS — I10 ESSENTIAL HYPERTENSION: ICD-10-CM

## 2021-07-25 RX ORDER — IRBESARTAN 300 MG/1
TABLET ORAL
Qty: 90 TABLET | Refills: 1 | Status: SHIPPED | OUTPATIENT
Start: 2021-07-25 | End: 2022-01-23

## 2021-12-27 ENCOUNTER — TELEPHONE (OUTPATIENT)
Dept: INTERNAL MEDICINE CLINIC | Age: 76
End: 2021-12-27

## 2021-12-27 NOTE — TELEPHONE ENCOUNTER
Thank you. Agree with rest, hydration, can take zinc and vitamin D. Hopefully has omicron variant which hopefully will not be as serious. Precautions for urgent care.   Quarantine for 10 days after positive test.

## 2021-12-27 NOTE — TELEPHONE ENCOUNTER
Pt calling states he just tested positive for covid today. Pt wants to know what he should do.  Please call back and advise

## 2021-12-27 NOTE — TELEPHONE ENCOUNTER
Spoke with patient and he reports that he has been sick with sinus headache, cough and congestion-no production. Afebrile at this time. SPO2 96% R/A. Patient reports  Home test positive for COVID. Patient is not vaccinated for COVID. Denies and shortness of breath or difficulty breathing. Patient advised to self isolate, good handwashing, masking. Advised to keep well hydrated and rest. Advised to go to urgent care if his symptoms worsen or do not resolve becomes fatigued with SOB. Patient states he is currently treating himself with OTC medications. Patient states understanding. Dr. Silvia Cook 45 notified.

## 2022-01-06 ENCOUNTER — TELEPHONE (OUTPATIENT)
Dept: INTERNAL MEDICINE CLINIC | Age: 77
End: 2022-01-06

## 2022-01-06 RX ORDER — PREDNISONE 20 MG/1
40 TABLET ORAL DAILY
Qty: 10 TABLET | Refills: 0 | Status: SHIPPED | OUTPATIENT
Start: 2022-01-06 | End: 2022-01-11

## 2022-01-06 RX ORDER — AZITHROMYCIN 250 MG/1
TABLET, FILM COATED ORAL
Qty: 6 TABLET | Refills: 0 | Status: SHIPPED | OUTPATIENT
Start: 2022-01-06 | End: 2022-01-11

## 2022-01-06 NOTE — TELEPHONE ENCOUNTER
Patient is requesting to speak with the nurse to  Follow up on his COVID diagnosis and what else he should be doing, states he BP is low.  He can be reached at [phone]

## 2022-01-06 NOTE — TELEPHONE ENCOUNTER
Spoke with patient and he reports that since his test positive for COVID 12/26/21 that his symptoms have improved-less coughing (production small amounts of clear phelgm). He is able to sleep with his CPAP not alerting. No fever but he remains fatigued with SOB with exertion. SPO2 95-96% on R/A. He continues to self isolate because symptoms persist but have not worsened. He would like to know if Dr. Tito Mckeon would reccommend and medications at this time. Dr. Tito Mckeon notified.

## 2022-01-06 NOTE — TELEPHONE ENCOUNTER
Symptoms almost 2 weeks now. Happy to hear that his oxygenation is okay. Unclear if there is anything that will benefit at this point other than time. He is unvaccinated. I will go ahead and send in a 5-day course of prednisone and azithromycin to help with any lung inflammation and possible bronchitis, but unclear if it would be helpful or not. Agree with monitoring oxygen levels and giving it more time. He should no longer be contagious and does not need to quarantine.

## 2022-01-06 NOTE — TELEPHONE ENCOUNTER
Spoke with patient and update on Dr. Mary Trimble comments and recommendations. Update on prescriptions sent to his pharmacy for Z-Stanley and Prednisone for any possible Bronchitis. Advised patient to continue to monitor his SPO2. Patient states understanding and grateful for the call.

## 2022-01-18 ENCOUNTER — OFFICE VISIT (OUTPATIENT)
Dept: SLEEP MEDICINE | Age: 77
End: 2022-01-18
Payer: MEDICARE

## 2022-01-18 VITALS
TEMPERATURE: 97.8 F | HEART RATE: 87 BPM | RESPIRATION RATE: 18 BRPM | WEIGHT: 200 LBS | SYSTOLIC BLOOD PRESSURE: 114 MMHG | DIASTOLIC BLOOD PRESSURE: 70 MMHG | BODY MASS INDEX: 31.39 KG/M2 | OXYGEN SATURATION: 94 % | HEIGHT: 67 IN

## 2022-01-18 DIAGNOSIS — G47.33 OSA (OBSTRUCTIVE SLEEP APNEA): Primary | ICD-10-CM

## 2022-01-18 PROCEDURE — G8417 CALC BMI ABV UP PARAM F/U: HCPCS | Performed by: SPECIALIST

## 2022-01-18 PROCEDURE — 3017F COLORECTAL CA SCREEN DOC REV: CPT | Performed by: SPECIALIST

## 2022-01-18 PROCEDURE — G8752 SYS BP LESS 140: HCPCS | Performed by: SPECIALIST

## 2022-01-18 PROCEDURE — G8754 DIAS BP LESS 90: HCPCS | Performed by: SPECIALIST

## 2022-01-18 PROCEDURE — G8536 NO DOC ELDER MAL SCRN: HCPCS | Performed by: SPECIALIST

## 2022-01-18 PROCEDURE — 99204 OFFICE O/P NEW MOD 45 MIN: CPT | Performed by: SPECIALIST

## 2022-01-18 PROCEDURE — G8427 DOCREV CUR MEDS BY ELIG CLIN: HCPCS | Performed by: SPECIALIST

## 2022-01-18 PROCEDURE — G8432 DEP SCR NOT DOC, RNG: HCPCS | Performed by: SPECIALIST

## 2022-01-18 PROCEDURE — 1101F PT FALLS ASSESS-DOCD LE1/YR: CPT | Performed by: SPECIALIST

## 2022-01-18 NOTE — PROGRESS NOTES
[address] [facility]. [place], [facility]  Tel.  [phone]  Fax. [address] [facility], [address] [facility]  Tel.  [phone]  Fax. [phone] [address] [place]   Tel.  [phone]  Fax. [phone]       Chief Complaint       Chief Complaint   Patient presents with    New Patient       HPI      Shahnaz Horn is 78 y.o. male seen for evaluation of a sleep disorder. He was initially evaluated in November 2014 having been diagnosed with sleep apnea several years previously. At that time he was reported is not using the device regularly. He noted ongoing daytime fatigue. CPAP was continued at 9 cm. HSAT in December 2016 demonstrated severe sleep disordered breathing characterized by an overall AHI of 30.2/h associated with minimal SaO2 of 82%. APAP 4-15 cm initiated. Had not been seen since 3/2/2017. Currently retires 9 PM and will get out of bed at 6 AM.  He will awaken several times during the night. He notes a history of snoring, waking a gasp or snort, kicking, vivid dreams, sitting up in bed while still asleep. He notes that he may doze if he is seated and inactive such as when reading or watching TV. States that he received a DreamStation 2. Compliance demonstrated during 30 days, CPAP use during 20 days with average use of 5.4 hours. CMS compliance 63%. Average AHI 6.9/h. Variable leak. He notes that he has rarely replaced supplies since starting on CPAP. No Known Allergies    Current Outpatient Medications   Medication Sig Dispense Refill    multivits,Stress Formula-Zinc tablet Take  by mouth daily.  irbesartan (AVAPRO) 300 mg tablet TAKE 1 TABLET BY MOUTH EVERY NIGHT. 90 Tablet 1    cholecalciferol (VITAMIN D3) (2,000 UNITS /50 MCG) cap capsule Take 1 Cap by mouth daily.  30 Cap 5    omeprazole (PRILOSEC) 40 mg capsule TK 1 C PO BID      tamsulosin (FLOMAX) 0.4 mg capsule Take 0.4 mg by mouth daily.  SAW PALMETTO PO Take 1 Tab by mouth two (2) times a day.  cpap machine kit by Does Not Apply route.  chlorpheniramine (CHLOR-TRIMETRON) 4 mg tablet Take 4 mg by mouth every six (6) hours as needed for Allergies. (Patient not taking: Reported on 1/18/2022)      acetaminophen (TYLENOL) 500 mg tablet Take 2 Tabs by mouth every six (6) hours as needed for Pain or Fever. (Patient not taking: Reported on 1/18/2022) 30 Tab 0    ibuprofen (MOTRIN) 800 mg tablet Take 1 Tab by mouth every six (6) hours as needed for Pain (Fever). (Patient not taking: Reported on 1/18/2022) 30 Tab 0        He  has a past medical history of Abnormal celiac antibody panel (01/2017), Adverse effect of anesthesia (09/2020), Ankle fracture, left (6/29/15), AR (allergic rhinitis), Arthritis, Pool esophagus, Chronic sinus complaints, COVID-19 vaccination declined, Cyst of left kidney, Elevated prostate specific antigen (PSA), Enlarged prostate, Esophageal adenocarcinoma ([address] (05/2020), Esophageal motility disorder (01/2017), GERD (gastroesophageal reflux disease), Hearing loss, HTN (hypertension), Kidney cysts, Left leg paresthesias, Normal cardiac stress test (03/07/2017), Onychomycosis, CLARICE (obstructive sleep apnea) (2003), Panic attacks (2002), Pleurisy, Pulmonary nodule, right (07/14/2020), Right inguinal hernia, Sinus bradycardia, Squamous cell carcinoma (1989), and UTI (urinary tract infection) (04/2008). He has no past medical history of Diabetes ([address] Difficult intubation, Malignant hyperthermia due to anesthesia, Nausea & vomiting, or Pseudocholinesterase deficiency.     He  has a past surgical history that includes colonoscopy (2002); prostate biopsies (2006, 2008); hx colonoscopy (10/3/14); hx endoscopy (10/02/2018); hx endoscopy (05/21/2018); hx endoscopy (05/15/2017); hx endoscopy (10/02/2014); hx tonsillectomy; hx hernia repair (Bilateral, 12/19/2014); hx ankle fracture tx (Left, 6/29/15); hx other surgical (); hx urological (2005 2008); hx endoscopy (01/21/2021); and hx endoscopy (09/02/2020). He family history includes Cancer in his mother; Celiac Disease in his daughter and mother; Diabetes in his father; Heart Disease in his father and maternal grandmother; Hypertension in his father. He  reports that he quit smoking about 44 years ago. His smoking use included cigarettes and cigars. He has a 10.00 pack-year smoking history. He has quit using smokeless tobacco. He reports previous alcohol use of about 3.0 standard drinks of alcohol per week. He reports that he does not use drugs. Review of Systems:  ROS      Objective:     Visit Vitals  /70   Pulse 87   Temp 97.8 °F (36.6 °C) (Temporal)   Resp 18   Ht 5' 7\" (1.702 m)   Wt 200 lb (90.7 kg)   SpO2 94%   BMI 31.32 kg/m²     Body mass index is 31.32 kg/m². General:   Conversant, cooperative   Eyes:  Pupils equal and reactive, no nystagmus   Oropharynx:   Mallampati score III, tongue normal       Neck:   No carotid bruits; Neck circ. in \"inches\": 15   Chest/Lungs:  Clear on auscultation    CVS:  Normal rate, regular rhythm   Skin:  Warm to touch; no obvious rashes   Neuro:  Speech fluent, face symmetrical, tongue movement normal   Psych:  Normal affect,  normal countenance        Assessment:       ICD-10-CM ICD-9-CM    1. CLARICE (obstructive sleep apnea)  G47.33 327.23 AMB SUPPLY ORDER     History of severe sleep disordered breathing. Variable AHI at present. Patient had not replaced supplies for quite some time. Those will be updated. Follow-up compliance appointment will be scheduled. PAP continues to benefit patient and remains necessary for control of his sleep apnea      Plan:     Orders Placed This Encounter    AMB SUPPLY ORDER     Diagnosis: Obstructive Sleep Apnea ICD-10 Code (G47.33)      CPAP mask and supplies -  Patient preference, headgear, heated tubing, and filter;  heated humidifier. Wireless modem.  Remote monitoring enrollment.  Oral/Nasal Combo Mask 1 every 3 months.  Oral Cushion Combo Mask (Replace) 2 per month.  Nasal Pillows Combo Mask (Replace) 2 per month.  Full Face Mask 1 every 3 months.  Full Face Mask Cushion 1 per month.  Nasal Cushion (Replace) 2 per month.  Nasal Pillows (Replace) 2 per month.  Nasal Interface Mask 1 every 3 months.  Headgear 1 every 6 months.  Chinstrap 1 every 6 months.  Tubing 1 every 3 months.  Filter(s) Disposable 2 per month.  Filter(s) Non-Disposable 1 every 6 months.  Oral Interface 1 every 3 months. [address] [facility] [address] [facility] for [employer] (Replace) 1 every 6 months.  Tubing with heating element 1 every 3 months.                 Starla Crump MD, [facility]  Diplomate, American Board of Sleep Medicine  NPI [id number]  Electronically signed 1/18/22       * Patient has a history and examination consistent with the diagnosis of sleep apnea. * Sleep testing results reviewed  * He was provided information on sleep apnea including corresponding risk factors and the importance of proper treatment. * Treatment options if indicated were reviewed today. Instructions:  o A copy of compliance data was provided to the patient and reviewed in detail. o CPAP will be  continued at the above pressure settings. The patient is to contact the office if there are problems with either mask or pressure settings. Follow-up will be scheduled at which time compliance data will be reviewed. o Do not engage in activities requiring a normal degree of alertness if fatigue is present.   o The patient understands that untreated or undertreated sleep apnea could impair judgement and the ability to function normally during the day.  o Call or return if symptoms worsen or persist.          Starla Crump MD, [facility]  Electronically signed 01/18/22       This note was created using voice recognition software. Despite editing, there may be syntax errors. This note will not be viewable in [address]

## 2022-01-18 NOTE — PROGRESS NOTES
No Kruse is a 78 y.o. male    Chief Complaint   Patient presents with   [facility]       Visit Vitals  /70   Pulse 87   Temp 97.8 °F (36.6 °C) (Temporal)   Resp 18   Ht 5' 7\" (1.702 m)   Wt 200 lb (90.7 kg)   SpO2 94%   BMI 31.32 kg/m²       3 most recent PHQ Screens 9/19/2019   PHQ Not Done -   Little interest or pleasure in doing things Not at all   Feeling down, depressed, irritable, or hopeless Not at all   Total Score PHQ 2 0       Fall Risk Assessment, last 12 mths 8/10/2020   Able to walk? Yes   Fall in past 12 months? No   Number of falls in past 12 months -   Fall with injury? -       Abuse Screening Questionnaire 9/19/2019   Do you ever feel afraid of your partner? N   Are you in a relationship with someone who physically or mentally threatens you? N   Is it safe for you to go home? Y       1. Have you been to the ER, urgent care clinic since your last visit? Hospitalized since your last visit? No     2. Have you seen or consulted any other health care providers outside of the [address] [place] [address] since your last visit? Include any pap smears or colon screening.  Dr Marcy Hargrove, Urologist

## 2022-01-23 DIAGNOSIS — I10 ESSENTIAL HYPERTENSION: ICD-10-CM

## 2022-01-23 RX ORDER — IRBESARTAN 300 MG/1
TABLET ORAL
Qty: 90 TABLET | Refills: 1 | Status: SHIPPED | OUTPATIENT
Start: 2022-01-23 | End: 2022-07-14

## 2022-02-06 NOTE — PERIOP NOTES
First name stated on voicemail; Left generalized message regarding COVID requirements, a COVID test needs to be completed in order to proceed with procedures at [facility]. Left message regarding curbside COVID swabbing at [facility] Monday, February 7th or Tuesday, February 8th from [phone]. Call [facility] at [phone] Monday thru Friday with questions or concerns.

## 2022-02-08 ENCOUNTER — TRANSCRIBE ORDER (OUTPATIENT)
Dept: REGISTRATION | Age: 77
End: 2022-02-08

## 2022-02-08 ENCOUNTER — HOSPITAL ENCOUNTER (OUTPATIENT)
Dept: LAB | Age: 77
Discharge: HOME | End: 2022-02-08
Payer: MEDICARE

## 2022-02-08 DIAGNOSIS — Z01.812 PRE-PROCEDURAL LABORATORY EXAMINATIONS: Primary | ICD-10-CM

## 2022-02-08 DIAGNOSIS — Z01.812 PRE-PROCEDURAL LABORATORY EXAMINATIONS: ICD-10-CM

## 2022-02-08 LAB
SARS-COV-2, XPLCVT: NOT DETECTED
SOURCE, COVRS: NORMAL

## 2022-02-08 PROCEDURE — U0005 INFEC AGEN DETEC AMPLI PROBE: HCPCS

## 2022-02-11 ENCOUNTER — ANESTHESIA EVENT (OUTPATIENT)
Dept: ENDOSCOPY | Age: 77
End: 2022-02-11
Payer: MEDICARE

## 2022-02-11 ENCOUNTER — HOSPITAL ENCOUNTER (OUTPATIENT)
Age: 77
Setting detail: OUTPATIENT SURGERY
Discharge: HOME | End: 2022-02-11
Attending: INTERNAL MEDICINE | Admitting: INTERNAL MEDICINE
Payer: MEDICARE

## 2022-02-11 ENCOUNTER — ANESTHESIA (OUTPATIENT)
Dept: ENDOSCOPY | Age: 77
End: 2022-02-11
Payer: MEDICARE

## 2022-02-11 VITALS
HEART RATE: 69 BPM | RESPIRATION RATE: 14 BRPM | WEIGHT: 207.89 LBS | HEIGHT: 67 IN | OXYGEN SATURATION: 96 % | SYSTOLIC BLOOD PRESSURE: 119 MMHG | TEMPERATURE: 97.7 F | BODY MASS INDEX: 32.63 KG/M2 | DIASTOLIC BLOOD PRESSURE: 64 MMHG

## 2022-02-11 PROCEDURE — 2709999900 HC NON-CHARGEABLE SUPPLY: Performed by: INTERNAL MEDICINE

## 2022-02-11 PROCEDURE — 77030021593 HC FCPS BIOP ENDOSC BSC -A: Performed by: INTERNAL MEDICINE

## 2022-02-11 PROCEDURE — 74011000250 HC RX REV CODE- 250: Performed by: NURSE ANESTHETIST, CERTIFIED REGISTERED

## 2022-02-11 PROCEDURE — 88305 TISSUE EXAM BY PATHOLOGIST: CPT

## 2022-02-11 PROCEDURE — 74011250636 HC RX REV CODE- 250/636: Performed by: NURSE ANESTHETIST, CERTIFIED REGISTERED

## 2022-02-11 PROCEDURE — 76060000031 HC ANESTHESIA FIRST 0.5 HR: Performed by: INTERNAL MEDICINE

## 2022-02-11 PROCEDURE — 76040000019: Performed by: INTERNAL MEDICINE

## 2022-02-11 RX ORDER — ATROPINE SULFATE 0.1 MG/ML
0.4 INJECTION INTRAVENOUS
Status: DISCONTINUED | OUTPATIENT
Start: 2022-02-11 | End: 2022-02-11 | Stop reason: HOSPADM

## 2022-02-11 RX ORDER — NALOXONE HYDROCHLORIDE 0.4 MG/ML
0.4 INJECTION, SOLUTION INTRAMUSCULAR; INTRAVENOUS; SUBCUTANEOUS
Status: DISCONTINUED | OUTPATIENT
Start: 2022-02-11 | End: 2022-02-11 | Stop reason: HOSPADM

## 2022-02-11 RX ORDER — SODIUM CHLORIDE 9 MG/ML
INJECTION, SOLUTION INTRAVENOUS
Status: DISCONTINUED | OUTPATIENT
Start: 2022-02-11 | End: 2022-02-11 | Stop reason: HOSPADM

## 2022-02-11 RX ORDER — EPINEPHRINE 0.1 MG/ML
1 INJECTION INTRACARDIAC; INTRAVENOUS
Status: DISCONTINUED | OUTPATIENT
Start: 2022-02-11 | End: 2022-02-11 | Stop reason: HOSPADM

## 2022-02-11 RX ORDER — SODIUM CHLORIDE 9 MG/ML
50 INJECTION, SOLUTION INTRAVENOUS CONTINUOUS
Status: DISCONTINUED | OUTPATIENT
Start: 2022-02-11 | End: 2022-02-11 | Stop reason: HOSPADM

## 2022-02-11 RX ORDER — DEXTROMETHORPHAN/PSEUDOEPHED 2.5-7.5/.8
1.2 DROPS ORAL
Status: DISCONTINUED | OUTPATIENT
Start: 2022-02-11 | End: 2022-02-11 | Stop reason: HOSPADM

## 2022-02-11 RX ORDER — PROPOFOL 10 MG/ML
INJECTION, EMULSION INTRAVENOUS AS NEEDED
Status: DISCONTINUED | OUTPATIENT
Start: 2022-02-11 | End: 2022-02-11 | Stop reason: HOSPADM

## 2022-02-11 RX ORDER — LIDOCAINE HYDROCHLORIDE 20 MG/ML
INJECTION, SOLUTION EPIDURAL; INFILTRATION; INTRACAUDAL; PERINEURAL AS NEEDED
Status: DISCONTINUED | OUTPATIENT
Start: 2022-02-11 | End: 2022-02-11 | Stop reason: HOSPADM

## 2022-02-11 RX ORDER — PROPOFOL 10 MG/ML
INJECTION, EMULSION INTRAVENOUS
Status: DISCONTINUED | OUTPATIENT
Start: 2022-02-11 | End: 2022-02-11 | Stop reason: HOSPADM

## 2022-02-11 RX ORDER — FLUMAZENIL 0.1 MG/ML
0.2 INJECTION INTRAVENOUS
Status: DISCONTINUED | OUTPATIENT
Start: 2022-02-11 | End: 2022-02-11 | Stop reason: HOSPADM

## 2022-02-11 RX ORDER — MIDAZOLAM HYDROCHLORIDE 1 MG/ML
.25-5 INJECTION, SOLUTION INTRAMUSCULAR; INTRAVENOUS
Status: DISCONTINUED | OUTPATIENT
Start: 2022-02-11 | End: 2022-02-11 | Stop reason: HOSPADM

## 2022-02-11 RX ADMIN — PROPOFOL INJECTABLE EMULSION 200 MCG/KG/MIN: 10 INJECTION, EMULSION INTRAVENOUS at 10:07

## 2022-02-11 RX ADMIN — PROPOFOL 100 MG: 10 INJECTION, EMULSION INTRAVENOUS at 10:07

## 2022-02-11 RX ADMIN — LIDOCAINE HYDROCHLORIDE 100 MG: 20 INJECTION, SOLUTION INTRAVENOUS at 10:06

## 2022-02-11 RX ADMIN — SODIUM CHLORIDE: 900 INJECTION, SOLUTION INTRAVENOUS at 10:06

## 2022-02-11 NOTE — PROGRESS NOTES
Morelia Young  1945  [id number]    Situation:  Verbal report received from:   Allie Puente RN   Procedure: Procedure(s):  ESOPHAGOGASTRODUODENOSCOPY (EGD)  COLON BIOPSY    Background:    Preoperative diagnosis: Pool's esophagus with dysplasia [K22.719]  Postoperative diagnosis: 1.- barretts esophagus  2.- hiatal hernia    :  Dr. Paris Chaney  Assistant(s): Endoscopy RN-1: Williams Bence  Endoscopy RN-2: Jolene Medeiros RN    Specimens:   ID Type Source Tests Collected by Time Destination   1 : esophageal biopsy at 36cm Preservative   Alexis Diamond MD 2/11/2022 1010 Pathology   2 : esophageal biopsy at 29 cm Preservative   Alexis Diamond MD 2/11/2022 1012 Pathology   3 : esophageal biopsy at 28 cm Preservative   Alexis Diamond MD 2/11/2022 1012 Pathology   4 : esophageal biopsy at 30cm Preservative   Alexis Diamond MD 2/11/2022 1013 Pathology   5 : esophageal biopsy at [address]   Alexis Diamond MD 2/11/2022 1016 Pathology     H. Pylori  no    Assessment:  Intra-procedure medications       Anesthesia gave intra-procedure sedation and medications, see anesthesia flow sheet yes    Intravenous fluids: NS@ KVO     Vital signs stable   yes    Abdominal assessment: round and soft   yes    Recommendation:  Discharge patient per MD order  yes.   Return to floor  outpatient  Family or Friend   spouse  Permission to share finding with family or friend yes

## 2022-02-11 NOTE — ANESTHESIA PREPROCEDURE EVALUATION
Anesthetic History   No history of anesthetic complications            Review of Systems / Medical History  Patient summary reviewed and pertinent labs reviewed    Pulmonary        Sleep apnea: CPAP           Neuro/Psych         Psychiatric history    Comments: LLE parasthesia  Anxiety/depression Cardiovascular    Hypertension: well controlled        Dysrhythmias ([place]) : PVC      Exercise tolerance: >4 METS     GI/Hepatic/Renal     GERD: poorly controlled      Hiatal hernia    Comments: Pool's esophagitis  Enlarged prostate Endo/Other        Obesity and arthritis     Other Findings              Physical Exam    Airway  Mallampati: II  TM Distance: 4 - 6 cm  Neck ROM: normal range of motion   Mouth opening: Normal     Cardiovascular    Rhythm: irregular  Rate: normal         Dental    Dentition: Lower dentition intact and Upper dentition intact     Pulmonary  Breath sounds clear to auscultation               Abdominal  GI exam deferred       Other Findings            Anesthetic Plan    ASA: 3  Anesthesia type: MAC          Induction: Intravenous  Anesthetic plan and risks discussed with: Patient      Informed consent obtained.

## 2022-02-11 NOTE — PROCEDURES
Aminah Munson M.D.  [phone]           2022                EGD Operative Report  Rosibel Corea  :  1945  [facility] Medical Record Number:  [id number]      Indication:  Pool's/esophageal ulcer     : David Daly MD    Referring Provider:  Jeff Negro MD      Anesthesia/Sedation:  MAC anesthesia    Airway assessment: No airway problems anticipated    Pre-Procedural Exam:      Airway: clear, no airway problems anticipated  Heart: RRR, without gallops or rubs  Lungs: clear bilaterally without wheezes, crackles, or rhonchi  Abdomen: soft, nontender, nondistended, bowel sounds present  Mental Status: awake, alert and oriented to person, place and time       Procedure Details     After infomed consent was obtained for the procedure, with all risks and benefits of procedure explained the patient was taken to the endoscopy suite and placed in the left lateral decubitus position. Following sequential administration of sedation as per above, the endoscope was inserted into the mouth and advanced under direct vision to second portion of the duodenum. A careful inspection was made as the gastroscope was withdrawn, including a retroflexed view of the proximal stomach; findings and interventions are described below. Findings:   Esophagus: Sullivan colored mucosa was noted starting at 36 cm from the gums level of the gastro-esophageal junction, however areas of healing squamous mucosa could be seen at 34 and 32 cm and an area of salmon colored mucosa was seen again at 32 cm from the gums which was only took about the third of the circumference and a new area of salmon colored mucosa at 26 cm from the gums.   Stomach: Small size hiatal hernia, otherwise mucosa within normal  Duodenum/jejunum: normal    Therapies:  none    Specimens: Four quadrant biopsies obtained at 36 cm, 34 cm, 32 cm, 30 cm and 26 cm           Complications:   None; patient tolerated the procedure well.    EBL:  None. Impression:    [place] colored mucosa consistent with Pool's esophagus seen from 36 cm to 30 cm with interspersed squamous mucosa and an island seen at 26 cm.  Four quadrant biopsies obtained                          Small hiatal hernia      Recommendations:    -Continue acid suppression.  -Await pathology to determine repeat EGD interval  -Continue with strict anti-reflux measures    Bart Reeves MD

## 2022-02-11 NOTE — H&P
Cristi Morales M.D.  [phone]            History and Physical       NAME:  Monet Torres   :   1945   MRN:   [id number]       Referring Physician:  Dr. Steve Velez Date: 2022 9:41 AM    Chief Complaint:  Pool's surveillance    History of Present Illness:  Patient is a 78 y.o. who is seen for Pool's surveillance. Denies any ongoing GI complaints. PMH:  Past Medical History:   Diagnosis Date    Abnormal celiac antibody panel 2017    Adverse effect of anesthesia 2020    could not urinate post procedure - catheter for over a week    Ankle fracture, left 6/29/15    Dr. Chin Faustin. and prox fibula. s/p surgery    AR (allergic rhinitis)     Arthritis     ritesh hands   [facility]     Dr. Brendon Ochoa, Dr. Ana Cerrato. EGD 2020 intramural adenocarcinoma. repeat endoscopy 2018 (low grade). Dr. Felipetine Side Chronic sinus complaints     COVID-19 vaccination declined     Cyst of left kidney     left. stable mid-pole cyst 2020    Elevated prostate specific antigen (PSA)     Dr. Rock Delacruz. Dr. Arenas Fine peak 14.3, bx 2008, 2006    Enlarged prostate     Esophageal adenocarcinoma ([address] 2020    intramural, non-invasive. non-virualed seen on biopsies. s/p albations w HALO 2020, 2020, 2020    Esophageal motility disorder 2017    +hiatal hernia    GERD (gastroesophageal reflux disease)     Hearing loss     hearing test 2009- bilat.  HTN (hypertension)     diet treated, stress echo  nl    Kidney cysts     left. s/p biopsy  Dr. Koffi Jeffries Left leg paresthesias     medical left leg. since left leg fracuture 2015    Normal cardiac stress test 2017    Onychomycosis     CLARICE (obstructive sleep apnea)     Dr. Hess Chol. wears CPAP at night setting 8.5    Panic attacks 2002    work-related    Pleurisy     Pulmonary nodule, right 2020    4 mm RLL pleural based nodule. CT [facility]. new compared to 18.     Right inguinal hernia     Sinus bradycardia     Squamous cell carcinoma 1989    left temple Dr. Susan Spaulding    UTI (urinary tract infection) 04/2008       PSH:  Past Surgical History:   Procedure Laterality Date    COLONOSCOPY  2002    normal except hemorrhoids    HX ANKLE FRACTURE TX Left 6/29/15    Dr. Ny Zafar    HX COLONOSCOPY  10/3/14    1 hyperplastic polyp. [place]    HX ENDOSCOPY  10/02/2018    HX ENDOSCOPY  05/21/2018    HX ENDOSCOPY  05/15/2017    HX ENDOSCOPY  10/02/2014    HX ENDOSCOPY  01/21/2021    HX ENDOSCOPY  09/02/2020    HX HERNIA REPAIR Bilateral 12/19/2014    Dr. Morrissey Pro OTHER SURGICAL      Laparoscopic bilateral inguinal hernia repair with mesh    HX TONSILLECTOMY      HX UROLOGICAL  2005 2008    prostate biopsy x 2- benign    PROSTATE BIOPSIES  2006, 2008    saw Dr. Lee Lewis. Now Dr. Ulysses Hickory       Allergies:  No Known Allergies    Home Medications:  Prior to Admission Medications   Prescriptions Last Dose Informant Patient Reported? Taking? SAW PALMETTO PO 2/11/2022 at Unknown time  Yes Yes   Sig: Take 1 Tab by mouth two (2) times a day. acetaminophen (TYLENOL) 500 mg tablet Not Taking at Unknown time  No No   Sig: Take 2 Tabs by mouth every six (6) hours as needed for Pain or Fever. Patient not taking: Reported on 1/18/2022   chlorpheniramine (CHLOR-TRIMETRON) 4 mg tablet Not Taking at Unknown time  Yes No   Sig: Take 4 mg by mouth every six (6) hours as needed for Allergies. Patient not taking: Reported on 1/18/2022   cholecalciferol (VITAMIN D3) (2,000 UNITS /50 MCG) cap capsule 2/11/2022 at Unknown time  No Yes   Sig: Take 1 Cap by mouth daily. cpap machine kit   Yes No   Sig: by Does Not Apply route. ibuprofen (MOTRIN) 800 mg tablet Unknown at Unknown time  No No   Sig: Take 1 Tab by mouth every six (6) hours as needed for Pain (Fever).    Patient not taking: Reported on 1/18/2022   irbesartan (AVAPRO) 300 mg tablet 2/10/2022 at Unknown time  No Yes   Sig: TAKE 1 TABLET BY MOUTH EVERY NIGHT.   multivits,Stress Formula-Zinc tablet Not Taking at Unknown time  Yes No   Sig: Take  by mouth daily. Patient not taking: Reported on 2022   omeprazole (PRILOSEC) 40 mg capsule 2022 at Unknown time  Yes Yes   Sig: TK 1 C PO BID   tamsulosin (FLOMAX) 0.4 mg capsule 2022 at Unknown time  Yes Yes   Sig: Take 0.4 mg by mouth daily. zinc sulfate (ZINC-15 PO) 2022 at Unknown time  Yes Yes   Sig: Take  by mouth daily.       Facility-Administered Medications: None       Hospital Medications:  Current Facility-Administered Medications   Medication Dose Route Frequency    0.9% sodium chloride infusion  50 mL/hr IntraVENous CONTINUOUS    midazolam (VERSED) injection 0.25-5 mg  0.25-5 mg IntraVENous Multiple    naloxone (NARCAN) injection 0.4 mg  0.4 mg IntraVENous Multiple    flumazeniL (ROMAZICON) 0.1 mg/mL injection 0.2 mg  0.2 mg IntraVENous Multiple    simethicone (MYLICON) 17ZJ/5.8KI oral drops 80 mg  1.2 mL Oral Multiple    atropine injection 0.4 mg  0.4 mg IntraVENous ONCE PRN    EPINEPHrine (ADRENALIN) 0.1 mg/mL syringe 1 mg  1 mg Endoscopically ONCE PRN       Social History:  Social History     Tobacco Use    Smoking status: Former Smoker     Packs/day: 1.00     Years: 10.00     Pack years: 10.00     Types: Cigarettes, Cigars     Quit date: 1978     Years since quittin.1    Smokeless tobacco: Former User   Substance Use Topics    Alcohol use: Not Currently     Alcohol/week: 3.0 standard drinks     Types: 3 Standard drinks or equivalent per week     Comment: no drinking in over 2 years       Family History:  Family History   Problem Relation Age of Onset   [facility] Cancer Mother         leukemia    Celiac Disease Mother     Diabetes Father         age 58    Hypertension Father     Heart Disease Father     Celiac Disease Daughter     Heart Disease Maternal Grandmother              Review of Systems:      Constitutional: negative fever, negative chills, negative weight loss  Eyes:   negative visual changes  ENT:   negative sore throat, tongue or lip swelling  Respiratory:  negative cough, negative dyspnea  Cards:  negative for chest pain, palpitations, lower extremity edema  GI:   See HPI  :  negative for frequency, dysuria  Integument:  negative for rash and pruritus  Heme:  negative for easy bruising and gum/nose bleeding  Musculoskel: negative for myalgias,  back pain and muscle weakness  Neuro: negative for headaches, dizziness, vertigo  Psych:  negative for feelings of anxiety, depression       Objective:     Patient Vitals for the past 8 hrs:   BP Temp Pulse Resp SpO2 Height Weight   02/11/22 0918 (!) 150/81 97.6 °F (36.4 °C) 72 22 99 % 5' 7\" (1.702 m) 94.3 kg (207 lb 14.3 oz)     No intake/output data recorded. No intake/output data recorded. EXAM:     NEURO-a&o   HEENT-wnl   LUNGS-clear    COR-regular rate and rhythym     ABD-soft , no tenderness, no rebound, good bs     EXT-no edema     Data Review     No results for input(s): WBC, HGB, HCT, PLT, HGBEXT, HCTEXT, PLTEXT in the last 72 hours. No results for input(s): NA, K, CL, CO2, BUN, CREA, GLU, PHOS, CA in the last 72 hours. No results for input(s): AP, TBIL, TP, ALB, GLOB, GGT, AML, LPSE in the last 72 hours. No lab exists for component: SGOT, GPT, AMYP, HLPSE  No results for input(s): INR, PTP, APTT, INREXT in the last 72 hours.     Patient Active Problem List   Diagnosis Code    HTN (hypertension) I10    Sinus bradycardia R00.1    AR (allergic rhinitis) J30.9    GERD (gastroesophageal reflux disease) K21.9    Hearing loss H91.90    UTI (urinary tract infection) N39.0    Onychomycosis B35.1    Right inguinal hernia K40.90    Elevated prostate specific antigen (PSA) R97.20    CLARICE (obstructive sleep apnea) G47.33    Bilateral inguinal hernia K40.20    Ankle fracture, left R36.323Y    Advanced care planning/counseling discussion Z71.89    Abnormal celiac antibody panel R89.4    Normal cardiac stress test [id number]    Influenza vaccination declined Z28.21    Pulmonary nodule, right R91.1    Esophageal adenocarcinoma (HCC) C15.9    Cyst of left kidney N28.1    Pool esophagus K22.70    Venous stasis dermatitis of right lower extremity I87.2    Chronic sinus complaints R09.89    COVID-19 vaccination declined Z28.21      Assessment:   · Pool's esophagus   Plan:   · EGD with biopsies today.      Signed By: Glory Bowman MD     2/11/2022  9:41 AM

## 2022-02-11 NOTE — DISCHARGE INSTRUCTIONS
Marlys Novak M.D.  [phone]           2022  Halbert Scheuermann  :  1945  [facility] Medical Record Number:  [id number]        ENDOSCOPY FINDINGS:   Your endoscopy showed the hiatal hernia and Pool's esophagus, no lesions seen. Biopsies were obtained. EGD DISCHARGE INSTRUCTIONS    DISCOMFORT:  Sore throat- throat lozenges or warm salt water gargle  redness at IV site- apply warm compress to area; if redness or soreness persist- contact your physician  Gaseous discomfort- walking, belching will help relieve any discomfort  You may not operate a vehicle for 12 hours  You may not engage in an occupation involving machinery or appliances for rest of today  You may not drink alcoholic beverages for at least 12 hours  Avoid making any critical decisions for at least 24 hour    DIET:   You may resume your regular diet. ACTIVITY  Spend the remainder of the day resting -  avoid any strenuous activity. Avoid driving or operating machinery. CALL M.D. ANY SIGN OF   Increasing pain, nausea, vomiting  Abdominal distension (swelling)  New increased bleeding (oral or rectal)  Fever (chills)  Pain in chest area  Bloody discharge from nose or mouth  Shortness of breath    Follow-up Instructions:   Call Dr. Michelle Riddle for any questions or problems. Telephone # [phone]  Biopsies were obtained, the results will be available  in  5 to 7 days. We will call you to notify you of these results. Continue same medications.  Follow strict anti-reflux measures

## 2022-02-11 NOTE — ANESTHESIA POSTPROCEDURE EVALUATION
Procedure(s):  ESOPHAGOGASTRODUODENOSCOPY (EGD)  COLON BIOPSY. MAC    Anesthesia Post Evaluation      Multimodal analgesia: multimodal analgesia not used between 6 hours prior to anesthesia start to PACU discharge  Patient location during evaluation: PACU  Patient participation: complete - patient participated  Level of consciousness: awake  Pain management: adequate  Airway patency: patent  Anesthetic complications: no  Cardiovascular status: acceptable, blood pressure returned to baseline and hemodynamically stable  Respiratory status: acceptable  Hydration status: acceptable  Post anesthesia nausea and vomiting:  controlled  Final Post Anesthesia Temperature Assessment:  Normothermia (36.0-37.5 degrees C)      INITIAL Post-op Vital signs:   Vitals Value Taken Time   /64 02/11/22 1040   Temp 36.5 °C (97.7 °F) 02/11/22 1025   Pulse 64 02/11/22 1045   Resp 14 02/11/22 1045   SpO2 96 % 02/11/22 1045   Vitals shown include unvalidated device data.

## 2022-02-11 NOTE — PROGRESS NOTES

## 2022-02-11 NOTE — PROGRESS NOTES
Endoscopy discharge instructions have been reviewed and given to patient. The patient verbalized understanding and acceptance of instructions. Dr. Bautista Palmer  discussed with patient procedure findings and next steps.

## 2022-02-22 ENCOUNTER — DOCUMENTATION ONLY (OUTPATIENT)
Dept: SLEEP MEDICINE | Age: 77
End: 2022-02-22

## 2022-03-16 ENCOUNTER — DOCUMENTATION ONLY (OUTPATIENT)
Dept: SLEEP MEDICINE | Age: 77
End: 2022-03-16

## 2022-03-16 ENCOUNTER — OFFICE VISIT (OUTPATIENT)
Dept: SLEEP MEDICINE | Age: 77
End: 2022-03-16
Payer: MEDICARE

## 2022-03-16 VITALS
DIASTOLIC BLOOD PRESSURE: 83 MMHG | BODY MASS INDEX: 32.89 KG/M2 | WEIGHT: 210 LBS | HEART RATE: 62 BPM | OXYGEN SATURATION: 99 % | SYSTOLIC BLOOD PRESSURE: 154 MMHG

## 2022-03-16 DIAGNOSIS — G47.33 OSA (OBSTRUCTIVE SLEEP APNEA): Primary | ICD-10-CM

## 2022-03-16 PROCEDURE — G8419 CALC BMI OUT NRM PARAM NOF/U: HCPCS | Performed by: SPECIALIST

## 2022-03-16 PROCEDURE — G8536 NO DOC ELDER MAL SCRN: HCPCS | Performed by: SPECIALIST

## 2022-03-16 PROCEDURE — 3017F COLORECTAL CA SCREEN DOC REV: CPT | Performed by: SPECIALIST

## 2022-03-16 PROCEDURE — 99213 OFFICE O/P EST LOW 20 MIN: CPT | Performed by: SPECIALIST

## 2022-03-16 PROCEDURE — G8754 DIAS BP LESS 90: HCPCS | Performed by: SPECIALIST

## 2022-03-16 PROCEDURE — G8753 SYS BP > OR = 140: HCPCS | Performed by: SPECIALIST

## 2022-03-16 PROCEDURE — 1101F PT FALLS ASSESS-DOCD LE1/YR: CPT | Performed by: SPECIALIST

## 2022-03-16 PROCEDURE — G8432 DEP SCR NOT DOC, RNG: HCPCS | Performed by: SPECIALIST

## 2022-03-16 PROCEDURE — G8427 DOCREV CUR MEDS BY ELIG CLIN: HCPCS | Performed by: SPECIALIST

## 2022-03-16 NOTE — PATIENT INSTRUCTIONS
Sleep Apnea: Care Instructions  Overview     Sleep apnea means that you frequently stop breathing for 10 seconds or longer during sleep. It can be mild to severe, based on the number of times an hour that you stop breathing. Blocked or narrowed airways in your nose, mouth, or throat can cause sleep apnea. Your airway can become blocked when your throat muscles and tongue relax during sleep. You can help treat sleep apnea at home by making lifestyle changes. You also can use a CPAP breathing machine that keeps tissues in the throat from blocking your airway. Or your doctor may suggest that you use a breathing device while you sleep. It helps keep your airway open. This could be a device that you put in your mouth. In some cases, surgery may be needed to remove enlarged tissues in the throat. Follow-up care is a key part of your treatment and safety. Be sure to make and go to all appointments, and call your doctor if you are having problems. It's also a good idea to know your test results and keep a list of the medicines you take. How can you care for yourself at home? · Lose weight, if needed. · Sleep on your side. It may help mild apnea. · Avoid alcohol and medicines such as sleeping pills, opioids, or sedatives before bed. · Don't smoke. If you need help quitting, talk to your doctor. · Prop up the head of your bed. · Treat breathing problems, such as a stuffy nose, that are caused by a cold or allergies. · Try a continuous positive airway pressure (CPAP) breathing machine if your doctor recommends it. · If CPAP doesn't work for you, ask your doctor if you can try other masks, settings, or breathing machines. · Try oral breathing devices or other nasal devices. · Talk to your doctor if your nose feels dry or bleeds, or if it gets runny or stuffy when you use a breathing machine. · Tell your doctor if you're sleepy during the day and it affects your daily life.  Don't drive or operate machinery when you're drowsy. When should you call for help? Watch closely for changes in your health, and be sure to contact your doctor if:    · You still have sleep apnea even though you have made lifestyle changes.     · You are thinking of trying a device such as CPAP.     · You are having problems using a CPAP or similar machine.     · You are still sleepy during the day, and it affects your daily life. Where can you learn more? Go to http://www.gray.com/  Enter J936 in the search box to learn more about \"Sleep Apnea: Care Instructions. \"  Current as of: July 6, 2021               Content Version: 13.2  © 2003-2384 [employer]. Care instructions adapted under license by [employer] (which disclaims liability or warranty for this information). If you have questions about a medical condition or this instruction, always ask your healthcare professional. [place] [address] any warranty or liability for your use of this information.

## 2022-03-16 NOTE — PROGRESS NOTES
[address] [place], [address]  Tel.  [phone]  Fax. [address]  [facility], [address]  Tel.  [phone]  Fax. [phone] [address] [facility] [place]   Tel.  [phone]  Fax. [phone]         Chief Complaint       Chief Complaint   Patient presents with    Sleep Problem     2 month cpap follow up         HPI        Annemarie Rivera is a 78 y.o. male seen for follow-up. He was initially evaluated in November 2014 having been diagnosed with sleep apnea several years previously. At that time he was reported is not using the device regularly. He noted ongoing daytime fatigue. CPAP was continued at 9 cm.     HSAT in December 2016 demonstrated severe sleep disordered breathing characterized by an overall AHI of 30.2/h associated with minimal SaO2 of 82%. APAP 4-15 cm initiated. Had not been seen since 3/2/2017. He received a replacement DreamStation 2. When seen 1/18/22 CMS compliance 63%. Average AHI 6.9/h. Variable leak. Supplies were updated. Compliance data downloaded and reviewed in detail with the patient today. During the past 30 days, APAP used during 30 days with the average daily use of 6.6 hours. CMS compliance criteria 93%. AHI 3.1 per hour. May doze at times if he is seated and inactive socially when reading or watching TV. Degree of daytime sleepiness has improved since CPAP started. No Known Allergies    Current Outpatient Medications   Medication Sig Dispense Refill    zinc sulfate (ZINC-15 PO) Take  by mouth daily.  irbesartan (AVAPRO) 300 mg tablet TAKE 1 TABLET BY MOUTH EVERY NIGHT. 90 Tablet 1    cholecalciferol (VITAMIN D3) (2,000 UNITS /50 MCG) cap capsule Take 1 Cap by mouth daily. 30 Cap 5    acetaminophen (TYLENOL) 500 mg tablet Take 2 Tabs by mouth every six (6) hours as needed for Pain or Fever.  30 Tab 0    omeprazole (PRILOSEC) 40 mg capsule TK 1 C PO BID      tamsulosin (FLOMAX) 0.4 mg capsule Take 0.4 mg by mouth daily.  SAW PALMETTO PO Take 1 Tab by mouth two (2) times a day.  cpap machine kit by Does Not Apply route.  multivits,Stress Formula-Zinc tablet Take  by mouth daily. (Patient not taking: Reported on 2/11/2022)      chlorpheniramine (CHLOR-TRIMETRON) 4 mg tablet Take 4 mg by mouth every six (6) hours as needed for Allergies. (Patient not taking: Reported on 1/18/2022)      ibuprofen (MOTRIN) 800 mg tablet Take 1 Tab by mouth every six (6) hours as needed for Pain (Fever). (Patient not taking: Reported on 1/18/2022) 30 Tab 0        He  has a past medical history of Abnormal celiac antibody panel (01/2017), Adverse effect of anesthesia (09/2020), Ankle fracture, left (6/29/15), AR (allergic rhinitis), Arthritis, Pool esophagus, Chronic sinus complaints, COVID-19 vaccination declined, Cyst of left kidney, Elevated prostate specific antigen (PSA), Enlarged prostate, Esophageal adenocarcinoma ([address] (05/2020), Esophageal motility disorder (01/2017), GERD (gastroesophageal reflux disease), Hearing loss, HTN (hypertension), Kidney cysts, Left leg paresthesias, Normal cardiac stress test (03/07/2017), Onychomycosis, CLARICE (obstructive sleep apnea) (2003), Panic attacks (2002), Pleurisy, Pulmonary nodule, right (07/14/2020), Right inguinal hernia, Sinus bradycardia, Squamous cell carcinoma (1989), and UTI (urinary tract infection) (04/2008). He has no past medical history of Diabetes ([address] Difficult intubation, Malignant hyperthermia due to anesthesia, Nausea & vomiting, or Pseudocholinesterase deficiency.     He  has a past surgical history that includes colonoscopy (2002); prostate biopsies (2006, 2008); hx colonoscopy (10/3/14); hx endoscopy (10/02/2018); hx endoscopy (05/21/2018); hx endoscopy (05/15/2017); hx endoscopy (10/02/2014); hx tonsillectomy; hx hernia repair (Bilateral, 12/19/2014); hx ankle fracture tx (Left, 6/29/15); hx other surgical (); hx endoscopy (01/21/2021); hx endoscopy (09/02/2020); and hx urological (2005 2008). He family history includes Cancer in his mother; Celiac Disease in his daughter and mother; Diabetes in his father; Heart Disease in his father and maternal grandmother; Hypertension in his father. He  reports that he quit smoking about 44 years ago. His smoking use included cigarettes and cigars. He has a 10.00 pack-year smoking history. He has quit using smokeless tobacco. He reports previous alcohol use of about 3.0 standard drinks of alcohol per week. He reports that he does not use drugs. Review of Systems:  Unchanged per patient      Objective:     Visit Vitals  BP (!) 154/83   Pulse 62   Wt 210 lb (95.3 kg)   SpO2 99%   BMI 32.89 kg/m²     Body mass index is 32.89 kg/m². General:   Conversant, cooperative   Eyes:  Pupils equal and reactive, no nystagmus   Oropharynx:   Mallampati score III, tongue normal                CVS:  Normal rate, regular rhythm        Neuro:  Speech fluent, face symmetrical             Assessment:       ICD-10-CM ICD-9-CM    1. CLARICE (obstructive sleep apnea)  G47.33 327.23 AMB SUPPLY ORDER       he is compliant with PAP therapy and PAP continues to benefit patient and remains necessary for control of his sleep apnea. CPAP will be continued at the current pressure settings. Plan:     Orders Placed This Encounter    AMB SUPPLY ORDER     Diagnosis: Obstructive Sleep Apnea ICD-10 Code (G47.33)    CPAP mask and supplies -  Patient preference, headgear, heated tubing, and filter;  heated humidifier. Wireless modem. Remote monitoring enrollment.  Oral/Nasal Combo Mask 1 every 3 months.  Oral Cushion Combo Mask (Replace) 2 per month.  Nasal Pillows Combo Mask (Replace) 2 per month.  Full Face Mask 1 every 3 months.  Full Face Mask Cushion 1 per month.  Nasal Cushion (Replace) 2 per month.  Nasal Pillows (Replace) 2 per month.     Nasal Interface Mask 1 every 3 months.  Headgear 1 every 6 months.  Chinstrap 1 every 6 months.  Tubing 1 every 3 months.  Filter(s) Disposable 2 per month.  Filter(s) Non-Disposable 1 every 6 months.  Oral Interface 1 every 3 months. [address] for Aletha Kashif (Replace) 1 every 6 months.  Tubing with heating element 1 every 3 months.                 Cr Zhong MD, [facility]  Diplomate, American Board of Sleep Medicine  NPI [id number]  Electronically signed 3/16/22       *A copy of compliance data was provided to the patient and reviewed in detail. *CPAP will be continued at the above pressure settings. The patient is to contact the office if there are problems with either mask or pressure settings. Follow-up will be scheduled at which time compliance data will be reviewed. * Patient has a history and examination consistent with the diagnosis of sleep apnea. .    * He was provided information on sleep apnea including corresponding risk factors and the importance of proper treatment. * Treatment options if indicated were reviewed today. Cr Zhong MD, [facility]  Electronically signed 03/16/22        This note was created using voice recognition software. Despite editing, there may be syntax errors. This note will not be viewable in [address]

## 2022-03-18 PROBLEM — R89.4 ABNORMAL CELIAC ANTIBODY PANEL: Status: ACTIVE | Noted: 2017-01-01

## 2022-03-18 PROBLEM — I87.2 VENOUS STASIS DERMATITIS OF RIGHT LOWER EXTREMITY: Status: ACTIVE | Noted: 2020-08-10

## 2022-03-19 PROBLEM — C15.9 ESOPHAGEAL ADENOCARCINOMA (HCC): Status: ACTIVE | Noted: 2020-05-01

## 2022-03-19 PROBLEM — Z28.21 INFLUENZA VACCINATION DECLINED: Status: ACTIVE | Noted: 2019-09-19

## 2022-03-19 PROBLEM — R91.1 PULMONARY NODULE, RIGHT: Status: ACTIVE | Noted: 2020-07-14

## 2022-03-28 ENCOUNTER — TELEPHONE (OUTPATIENT)
Dept: INTERNAL MEDICINE CLINIC | Age: 77
End: 2022-03-28

## 2022-03-28 NOTE — TELEPHONE ENCOUNTER
Spoke with patient and updated to fast for his upcoming appt with routine labs. He states understanding and grateful for the call.

## 2022-03-28 NOTE — TELEPHONE ENCOUNTER
----- Message from Lebron Grande sent at 3/28/2022  3:20 PM EDT -----  Subject: Message to Provider    QUESTIONS  Information for Provider? pt needs to know if his appt on wednesday   includes fasting labs.  ---------------------------------------------------------------------------  --------------  [address]  What is the best way for the office to contact you? OK to leave message on   voicemail  Preferred Call Back Phone Number? [phone]  ---------------------------------------------------------------------------  --------------  SCRIPT ANSWERS  Relationship to Patient?  Self

## 2022-03-30 ENCOUNTER — OFFICE VISIT (OUTPATIENT)
Dept: INTERNAL MEDICINE CLINIC | Age: 77
End: 2022-03-30
Payer: MEDICARE

## 2022-03-30 VITALS
TEMPERATURE: 97.9 F | OXYGEN SATURATION: 97 % | HEART RATE: 52 BPM | WEIGHT: 209.8 LBS | HEIGHT: 67 IN | SYSTOLIC BLOOD PRESSURE: 107 MMHG | DIASTOLIC BLOOD PRESSURE: 66 MMHG | BODY MASS INDEX: 32.93 KG/M2 | RESPIRATION RATE: 15 BRPM

## 2022-03-30 DIAGNOSIS — Z00.00 MEDICARE ANNUAL WELLNESS VISIT, SUBSEQUENT: Primary | ICD-10-CM

## 2022-03-30 DIAGNOSIS — Z28.21 COVID-19 VACCINATION DECLINED: ICD-10-CM

## 2022-03-30 DIAGNOSIS — I10 ESSENTIAL HYPERTENSION: ICD-10-CM

## 2022-03-30 DIAGNOSIS — C15.9 ESOPHAGEAL ADENOCARCINOMA (HCC): ICD-10-CM

## 2022-03-30 DIAGNOSIS — R97.20 ELEVATED PROSTATE SPECIFIC ANTIGEN (PSA): ICD-10-CM

## 2022-03-30 PROCEDURE — G0439 PPPS, SUBSEQ VISIT: HCPCS | Performed by: INTERNAL MEDICINE

## 2022-03-30 PROCEDURE — 1101F PT FALLS ASSESS-DOCD LE1/YR: CPT | Performed by: INTERNAL MEDICINE

## 2022-03-30 PROCEDURE — 3017F COLORECTAL CA SCREEN DOC REV: CPT | Performed by: INTERNAL MEDICINE

## 2022-03-30 PROCEDURE — G8752 SYS BP LESS 140: HCPCS | Performed by: INTERNAL MEDICINE

## 2022-03-30 PROCEDURE — G8754 DIAS BP LESS 90: HCPCS | Performed by: INTERNAL MEDICINE

## 2022-03-30 PROCEDURE — G8427 DOCREV CUR MEDS BY ELIG CLIN: HCPCS | Performed by: INTERNAL MEDICINE

## 2022-03-30 PROCEDURE — 99213 OFFICE O/P EST LOW 20 MIN: CPT | Performed by: INTERNAL MEDICINE

## 2022-03-30 PROCEDURE — G0463 HOSPITAL OUTPT CLINIC VISIT: HCPCS | Performed by: INTERNAL MEDICINE

## 2022-03-30 PROCEDURE — G8419 CALC BMI OUT NRM PARAM NOF/U: HCPCS | Performed by: INTERNAL MEDICINE

## 2022-03-30 PROCEDURE — G8510 SCR DEP NEG, NO PLAN REQD: HCPCS | Performed by: INTERNAL MEDICINE

## 2022-03-30 PROCEDURE — G8536 NO DOC ELDER MAL SCRN: HCPCS | Performed by: INTERNAL MEDICINE

## 2022-03-30 RX ORDER — TETANUS TOXOID, REDUCED DIPHTHERIA TOXOID AND ACELLULAR PERTUSSIS VACCINE, ADSORBED 5; 2.5; 8; 8; 2.5 [IU]/.5ML; [IU]/.5ML; UG/.5ML; UG/.5ML; UG/.5ML
0.5 SUSPENSION INTRAMUSCULAR ONCE
Qty: 1 EACH | Refills: 0 | Status: SHIPPED | OUTPATIENT
Start: 2022-03-30 | End: 2022-03-30

## 2022-03-30 RX ORDER — ZOSTER VACCINE RECOMBINANT, ADJUVANTED 50 MCG/0.5
0.5 KIT INTRAMUSCULAR ONCE
Qty: 0.5 ML | Refills: 1
Start: 2022-03-30 | End: 2022-03-30

## 2022-03-30 NOTE — PROGRESS NOTES
This is a Subsequent Medicare Annual Wellness Visit providing Personalized Prevention Plan Services (PPPS) (Performed 12 months after initial AWV and PPPS )    I have reviewed the patient's medical history in detail and updated the computerized patient record. Chief Complaint   Patient presents with   [employer] Annual Wellness Visit     Appt with retina specialist coming. Left shoulder pain with movement. Off and on low back pain.  Labs     Fasting. Sold home in [place]    Left shoulder pain. Worse anteriorly when lifting arm or reaching back. No injury. Some pain at night. LBP intermittent. PSA is increased to \"7\" per urology last month. This is increased. Repeat 6 months. Taking flomax, saw palmetto    Macular degeneration. Seeing retinal specialist 4/11/22. .      hx Pool's and esophageal cancer 5/2020. EGD 2/2022 Dr Lolita Higgins stable. GERD s/s mild. .   Taking PPI. Drinks only water for 4 years. OSAP on CPAP. Recent review good. Hypertension  Hypertension ROS: taking medications as instructed, no medication side effects noted, no TIA's, no chest pain on exertion, no dyspnea on exertion, no swelling of ankles     reports that he quit smoking about 44 years ago. His smoking use included cigarettes and cigars. He has a 10.00 pack-year smoking history. He has quit using smokeless tobacco.    reports previous alcohol use of about 3.0 standard drinks of alcohol per week. BP Readings from Last 2 Encounters:   03/30/22 107/66   03/16/22 (!) 154/83         History     Past Medical History:   Diagnosis Date    Abnormal celiac antibody panel 01/2017    Adverse effect of anesthesia 09/2020    could not urinate post procedure - catheter for over a week    Ankle fracture, left 6/29/15    Dr. Anushka Tenorio. and prox fibula. s/p surgery    AR (allergic rhinitis)     Arthritis     ritesh hands   Sidra Ramos, Dr. Lolita Higgins. EGD 5/2020 intramural adenocarcinoma.   repeat endoscopy 5/2018 (low grade). Dr. Martin Garvin Chronic sinus complaints     COVID-19 vaccination declined     Cyst of left kidney     left. stable mid-pole cyst 7/2020    Elevated prostate specific antigen (PSA)     Dr. Meir Garcia. Dr. Reji Tirado peak 14.3, bx 5/2008, 2006    Enlarged prostate     Esophageal adenocarcinoma ([address] 05/2020    intramural, non-invasive. non-virualed seen on biopsies. s/p albations w HALO 5/2020, 6/2020, 9/2020    Esophageal motility disorder 01/2017    +hiatal hernia    GERD (gastroesophageal reflux disease)     Hearing loss     hearing test 4/2009- bilat.  HTN (hypertension)     diet treated, stress echo 12/07 nl    Kidney cysts     left. s/p biopsy 2018 Dr. Rosalva Katz Left leg paresthesias     medical left leg. since left leg fracuture 6/2015    Normal cardiac stress test 03/07/2017    Onychomycosis     CLARICE (obstructive sleep apnea) 2003    Dr. Dillon Melendez. wears CPAP at night setting 8.5    Panic attacks 2002    work-related    Pleurisy     Pulmonary nodule, right 07/14/2020    4 mm RLL pleural based nodule. CT [facility] new [facility] to 8/13/18.  Right inguinal hernia     Sinus bradycardia     Squamous cell carcinoma 1989    left temple Dr. Ilia Contreras    UTI (urinary tract infection) 04/2008       Past Surgical History:   Procedure Laterality Date    COLONOSCOPY  2002    normal except hemorrhoids    HX ANKLE FRACTURE TX Left 6/29/15    Dr. Horacio Wolf    HX COLONOSCOPY  10/3/14    1 hyperplastic polyp. Milka    HX ENDOSCOPY  10/02/2018    HX ENDOSCOPY  05/21/2018    HX ENDOSCOPY  05/15/2017    HX ENDOSCOPY  10/02/2014    HX ENDOSCOPY  01/21/2021    HX ENDOSCOPY  09/02/2020    HX ENDOSCOPY  02/11/2022    Dr Laila Johnson. appeared Pool's.  benign bx    HX HERNIA REPAIR Bilateral 12/19/2014    Dr. Curtis Wang [facility]      Laparoscopic bilateral inguinal hernia repair with mesh    HX TONSILLECTOMY      PROSTATE BIOPSIES  2006, 2008    saw Dr. Reji Tirado.   Now  Willow Gomes       Current Outpatient Medications   Medication Sig    diphth, pertus,acell,, tetanus (Boostrix Tdap) 2.5-8-5 Lf-mcg-Lf/0.5mL syrg 0.5 mL by IntraMUSCular route once for 1 dose. Indications: vaccination to prevent diphtheria, pertussis and tetanus    Shingrix, PF, 50 mcg/0.5 mL susr injection 0.5 mL by IntraMUSCular route once for 1 dose. Receive 2nd dose in 2-6 months. For Shingles (Zoster) prevention    zinc sulfate (ZINC-15 PO) Take  by mouth daily.  irbesartan (AVAPRO) 300 mg tablet TAKE 1 TABLET BY MOUTH EVERY NIGHT.  cholecalciferol (VITAMIN D3) (2,000 UNITS /50 MCG) cap capsule Take 1 Cap by mouth daily.  acetaminophen (TYLENOL) 500 mg tablet Take 2 Tabs by mouth every six (6) hours as needed for Pain or Fever.  omeprazole (PRILOSEC) 40 mg capsule TK 1 C PO BID    tamsulosin (FLOMAX) 0.4 mg capsule Take 0.4 mg by mouth daily.  SAW PALMETTO PO Take 1 Tab by mouth two (2) times a day.  cpap machine kit by Does Not Apply route.  multivits,Stress Formula-Zinc tablet Take  by mouth daily. (Patient not taking: Reported on 2/11/2022)    chlorpheniramine (CHLOR-TRIMETRON) 4 mg tablet Take 4 mg by mouth every six (6) hours as needed for Allergies. (Patient not taking: Reported on 1/18/2022)    ibuprofen (MOTRIN) 800 mg tablet Take 1 Tab by mouth every six (6) hours as needed for Pain (Fever). (Patient not taking: Reported on 1/18/2022)     No current facility-administered medications for this visit. No Known Allergies    Family History   Problem Relation Age of Onset   Lyman Cancer Mother         leukemia    Celiac Disease Mother     Diabetes Father         age 58    Hypertension Father     Heart Disease Father     Celiac Disease Daughter     Heart Disease Maternal Grandmother         reports that he quit smoking about 44 years ago. His smoking use included cigarettes and cigars. He has a 10.00 pack-year smoking history.  He has quit using smokeless tobacco.   reports previous alcohol use of about 3.0 standard drinks of alcohol per week. Depression Risk Factor Screening:       Alcohol Risk Factor Screening: On any occasion during the past 3 months, have you had more than 3 drinks containing alcohol? No    Do you average more than 14 drinks per week? No      Functional Ability and Level of Safety:     Hearing Loss   mild    Activities of Daily Living   Self-care. Requires assistance with: no ADLs    Fall Risk     Fall Risk Assessment, last 12 mths 3/30/2022   Able to walk? Yes   Fall in past 12 months? 0   Do you feel unsteady? 0   Are you worried about falling 0   Number of falls in past 12 months -   Fall with injury? -         Abuse Screen   Patient is not abused    Review of Systems   A comprehensive review of systems was negative except for that written in the HPI. Physical Examination     Evaluation of Cognitive Function:  Mood/affect:  neutral, happy  Appearance: age appropriate  Family member/caregiver input: none    Blood pressure 107/66, pulse (!) 52, temperature 97.9 °F (36.6 °C), temperature source Oral, resp. rate 15, height 5' 7\" (1.702 m), weight 209 lb 12.8 oz (95.2 kg), SpO2 97 %. General appearance: alert, cooperative, no distress, appears stated age  Neck: supple, symmetrical, trachea midline, no adenopathy, thyroid: not enlarged, symmetric, no tenderness/mass/nodules, no carotid bruit and no JVD  Lungs: clear to auscultation bilaterally  Heart: regular rate and rhythm, S1, S2 normal, no murmur, click, rub or gallop  Extremities: extremities normal, atraumatic, no cyanosis or edema    Patient Care Team:  Aurora Archibald MD as PCP - [facility], Beto Dutton [facility]  Johan Chaidez MD as Physician (Sleep Medicine)      Advice/Referrals/Counseling   Education and counseling provided. See below for specific orders    Assessment/Plan     ACP reviewed.  Primary medical decision maker reviewed with patient and updated in chart.    he is otherwise up-to-date or have declined preventative services except for those ordered below. Diagnoses and all orders for this visit:    1. Medicare annual wellness visit, subsequent  3. COVID-19 vaccination declined  -     diphth, pertus,acell,, tetanus (Boostrix Tdap) 2.5-8-5 Lf-mcg-Lf/0.5mL syrg; 0.5 mL by IntraMUSCular route once for 1 dose. Indications: vaccination to prevent diphtheria, pertussis and tetanus  -     Shingrix, PF, 50 mcg/0.5 mL susr injection; 0.5 mL by IntraMUSCular route once for 1 dose. Receive 2nd dose in 2-6 months. For Shingles (Zoster) prevention    2. Esophageal adenocarcinoma ([facility] [address]  Stable. No recurrence  This condition appears to be stable and is being evaluated and managed by his specialist.   No acute findings today warrant change in management plan. 4. Essential hypertension  This condition is controlled on current medication regimen as written in medication list.  Medications refilled. -     CBC WITH AUTOMATED DIFF; Future  -     METABOLIC PANEL, COMPREHENSIVE; Future  -     LIPID PANEL; Future    5. Elevated prostate specific antigen (PSA)  This condition appears to be stable and is being evaluated and managed by his specialist in urology. No acute findings today warrant change in management plan. Aubrie Duval Potential medication side effects were discussed with the patient; let me know if any occur.   Return for yearly Annual Wellness Visits

## 2022-03-31 LAB
ALBUMIN SERPL-MCNC: 3.4 G/DL (ref 3.5–5)
ALBUMIN/GLOB SERPL: 1.1 {RATIO} (ref 1.1–2.2)
ALP SERPL-CCNC: 157 U/L (ref 45–117)
ALT SERPL-CCNC: 28 U/L (ref 12–78)
ANION GAP SERPL CALC-SCNC: 4 MMOL/L (ref 5–15)
AST SERPL-CCNC: 23 U/L (ref 15–37)
BASOPHILS # BLD: 0 K/UL (ref 0–0.1)
BASOPHILS NFR BLD: 1 % (ref 0–1)
BILIRUB SERPL-MCNC: 1 MG/DL (ref 0.2–1)
BUN SERPL-MCNC: 15 MG/DL (ref 6–20)
BUN/CREAT SERPL: 16 (ref 12–20)
CALCIUM SERPL-MCNC: 8.7 MG/DL (ref 8.5–10.1)
CHLORIDE SERPL-SCNC: 108 MMOL/L (ref 97–108)
CHOLEST SERPL-MCNC: 147 MG/DL
CO2 SERPL-SCNC: 27 MMOL/L (ref 21–32)
CREAT SERPL-MCNC: 0.93 MG/DL (ref 0.7–1.3)
DIFFERENTIAL METHOD BLD: NORMAL
EOSINOPHIL # BLD: 0.2 K/UL (ref 0–0.4)
EOSINOPHIL NFR BLD: 2 % (ref 0–7)
ERYTHROCYTE [DISTWIDTH] IN BLOOD BY AUTOMATED COUNT: 14.2 % (ref 11.5–14.5)
GLOBULIN SER CALC-MCNC: 3.1 G/DL (ref 2–4)
GLUCOSE SERPL-MCNC: 79 MG/DL (ref 65–100)
HCT VFR BLD AUTO: 42.7 % (ref 36.6–50.3)
HDLC SERPL-MCNC: 45 MG/DL
HDLC SERPL: 3.3 {RATIO} (ref 0–5)
HGB BLD-MCNC: 13.8 G/DL (ref 12.1–17)
IMM GRANULOCYTES # BLD AUTO: 0 K/UL (ref 0–0.04)
IMM GRANULOCYTES NFR BLD AUTO: 0 % (ref 0–0.5)
LDLC SERPL CALC-MCNC: 89.6 MG/DL (ref 0–100)
LYMPHOCYTES # BLD: 1.4 K/UL (ref 0.8–3.5)
LYMPHOCYTES NFR BLD: 20 % (ref 12–49)
MCH RBC QN AUTO: 30.4 PG (ref 26–34)
MCHC RBC AUTO-ENTMCNC: 32.3 G/DL (ref 30–36.5)
MCV RBC AUTO: 94.1 FL (ref 80–99)
MONOCYTES # BLD: 0.8 K/UL (ref 0–1)
MONOCYTES NFR BLD: 11 % (ref 5–13)
NEUTS SEG # BLD: 4.8 K/UL (ref 1.8–8)
NEUTS SEG NFR BLD: 66 % (ref 32–75)
NRBC # BLD: 0 K/UL (ref 0–0.01)
NRBC BLD-RTO: 0 PER 100 WBC
PLATELET # BLD AUTO: 194 K/UL (ref 150–400)
PMV BLD AUTO: 10.8 FL (ref 8.9–12.9)
POTASSIUM SERPL-SCNC: 4.3 MMOL/L (ref 3.5–5.1)
PROT SERPL-MCNC: 6.5 G/DL (ref 6.4–8.2)
RBC # BLD AUTO: 4.54 M/UL (ref 4.1–5.7)
SODIUM SERPL-SCNC: 139 MMOL/L (ref 136–145)
TRIGL SERPL-MCNC: 62 MG/DL (ref ?–150)
VLDLC SERPL CALC-MCNC: 12.4 MG/DL
WBC # BLD AUTO: 7.1 K/UL (ref 4.1–11.1)

## 2022-07-14 DIAGNOSIS — I10 ESSENTIAL HYPERTENSION: ICD-10-CM

## 2022-07-14 RX ORDER — IRBESARTAN 300 MG/1
TABLET ORAL
Qty: 90 TABLET | Refills: 1 | Status: SHIPPED | OUTPATIENT
Start: 2022-07-14

## 2023-01-18 DIAGNOSIS — I10 ESSENTIAL HYPERTENSION: ICD-10-CM

## 2023-01-18 RX ORDER — IRBESARTAN 300 MG/1
TABLET ORAL
Qty: 90 TABLET | Refills: 1 | Status: SHIPPED | OUTPATIENT
Start: 2023-01-18

## 2023-03-15 ENCOUNTER — DOCUMENTATION ONLY (OUTPATIENT)
Dept: SLEEP MEDICINE | Age: 78
End: 2023-03-15

## 2023-03-15 ENCOUNTER — OFFICE VISIT (OUTPATIENT)
Dept: SLEEP MEDICINE | Age: 78
End: 2023-03-15
Payer: MEDICARE

## 2023-03-15 VITALS
WEIGHT: 213.4 LBS | HEIGHT: 67 IN | DIASTOLIC BLOOD PRESSURE: 93 MMHG | OXYGEN SATURATION: 97 % | BODY MASS INDEX: 33.49 KG/M2 | SYSTOLIC BLOOD PRESSURE: 153 MMHG | HEART RATE: 65 BPM

## 2023-03-15 DIAGNOSIS — G47.33 OSA (OBSTRUCTIVE SLEEP APNEA): Primary | ICD-10-CM

## 2023-03-15 PROCEDURE — 3077F SYST BP >= 140 MM HG: CPT | Performed by: SPECIALIST

## 2023-03-15 PROCEDURE — G8432 DEP SCR NOT DOC, RNG: HCPCS | Performed by: SPECIALIST

## 2023-03-15 PROCEDURE — G8417 CALC BMI ABV UP PARAM F/U: HCPCS | Performed by: SPECIALIST

## 2023-03-15 PROCEDURE — 1101F PT FALLS ASSESS-DOCD LE1/YR: CPT | Performed by: SPECIALIST

## 2023-03-15 PROCEDURE — 3080F DIAST BP >= 90 MM HG: CPT | Performed by: SPECIALIST

## 2023-03-15 PROCEDURE — 99213 OFFICE O/P EST LOW 20 MIN: CPT | Performed by: SPECIALIST

## 2023-03-15 PROCEDURE — G8427 DOCREV CUR MEDS BY ELIG CLIN: HCPCS | Performed by: SPECIALIST

## 2023-03-15 PROCEDURE — 1123F ACP DISCUSS/DSCN MKR DOCD: CPT | Performed by: SPECIALIST

## 2023-03-15 PROCEDURE — G8536 NO DOC ELDER MAL SCRN: HCPCS | Performed by: SPECIALIST

## 2023-03-15 NOTE — PROGRESS NOTES
[facility] [place]. [place], [address]  Tel.  [phone]  Fax. [address] [facility] [address]  [place], [address] [facility]  Tel.  [phone]  Fax. [phone] [address] [facility] [address]   Tel.  [phone]  Fax. [phone]         Chief Complaint       Chief Complaint   Patient presents with    Sleep Problem     Yearly follow up         HPI        Gwen Harrington is a 70 y.o. male seen for follow-up. He was initially evaluated in November 2014 having been diagnosed with sleep apnea several years previously. At that time he was reported is not using the device regularly. He noted ongoing daytime fatigue. CPAP was continued at 9 cm. HSAT in December 2016 demonstrated severe sleep disordered breathing characterized by an overall AHI of 30.2/h associated with minimal SaO2 of 82%. APAP 4-15 cm initiated. He received a replacement DreamStation 2. When seen 3/16/22 AHI 3.1. Compliance data downloaded and reviewed in detail with the patient today. During the past 30 days, APAP used during 28 days with the average daily use of 6.55 hours. CMS compliance criteria 83%. AHI 5.9 per hour. Continues sleeping well without significant nocturnal awakening. [place] Sleepiness Scale: 10.  May doze if seated and inactive such as when reading. No Known Allergies    Current Outpatient Medications   Medication Sig Dispense Refill    irbesartan (AVAPRO) 300 mg tablet TAKE 1 TABLET BY MOUTH EVERY NIGHT 90 Tablet 1    zinc sulfate (ZINC-15 PO) Take  by mouth daily. cholecalciferol (VITAMIN D3) (2,000 UNITS /50 MCG) cap capsule Take 1 Cap by mouth daily. 30 Cap 5    omeprazole (PRILOSEC) 40 mg capsule TK 1 C PO BID      tamsulosin (FLOMAX) 0.4 mg capsule Take 0.4 mg by mouth daily. SAW PALMETTO PO Take 1 Tab by mouth two (2) times a day. cpap machine kit by Does Not Apply route.       acetaminophen (TYLENOL) 500 mg tablet Take 2 Tabs by mouth every six (6) hours as needed for Pain or Fever. (Patient not taking: Reported on 3/15/2023) 30 Tab 0        He  has a past medical history of Abnormal celiac antibody panel (01/2017), Adverse effect of anesthesia (09/2020), Ankle fracture, left (6/29/15), AR (allergic rhinitis), Arthritis, Pool esophagus, Chronic sinus complaints, COVID-19 vaccination declined, Cyst of left kidney, Elevated prostate specific antigen (PSA), Enlarged prostate, Esophageal adenocarcinoma ([facility] [address] (05/2020), Esophageal motility disorder (01/2017), GERD (gastroesophageal reflux disease), Hearing aid worn, HTN (hypertension), Kidney cysts, Left leg paresthesias, Normal cardiac stress test (03/07/2017), Onychomycosis, CLARICE (obstructive sleep apnea) (2003), Panic attacks (2002), Pleurisy, Pulmonary nodule, right (07/14/2020), Right inguinal hernia, Sinus bradycardia, Squamous cell carcinoma (1989), and UTI (urinary tract infection) (04/2008). He has no past medical history of Diabetes ([facility] [address] Difficult intubation, Malignant hyperthermia due to anesthesia, Nausea & vomiting, or Pseudocholinesterase deficiency. He  has a past surgical history that includes colonoscopy (2002); prostate biopsies (2006, 2008); hx colonoscopy (10/3/14); hx endoscopy (10/02/2018); hx endoscopy (05/21/2018); hx endoscopy (05/15/2017); hx endoscopy (10/02/2014); hx tonsillectomy; hx hernia repair (Bilateral, 12/19/2014); hx ankle fracture tx (Left, 6/29/15); hx other surgical (); hx endoscopy (01/21/2021); hx endoscopy (09/02/2020); and hx endoscopy (02/11/2022). He family history includes Cancer in his mother; Celiac Disease in his daughter and mother; Diabetes in his father; Heart Disease in his father and maternal grandmother; Hypertension in his father. He  reports that he quit smoking about 45 years ago. His smoking use included cigarettes and cigars. He has a 10.00 pack-year smoking history.  He has quit using smokeless tobacco. He reports that he does not currently use alcohol after a past usage of about 3.0 standard drinks per week. He reports that he does not use drugs. Review of Systems:  Unchanged per patient      Objective:   Visit Vitals  BP (!) 153/93 (BP 1 Location: Left upper arm, BP Patient Position: Sitting)   Pulse 65   Ht 5' 7\" (1.702 m)   Wt 213 lb 6.4 oz (96.8 kg)   SpO2 97%   BMI 33.42 kg/m²     Body mass index is 33.42 kg/m². General:   Conversant, cooperative       Oropharynx:   Mallampati score II, tongue normal            Chest/Lungs:  Clear on auscultation    CVS:  Normal rate, regular rhythm                      Assessment:       ICD-10-CM ICD-9-CM    1. CLARICE (obstructive sleep apnea)  G47.33 327.23 AMB SUPPLY ORDER          he is compliant with PAP therapy and PAP continues to benefit patient and remains necessary for control of his sleep apnea. Pressure will be changed to 6-15 cm. Remote compliance review in 2 weeks. Plan:     Orders Placed This Encounter    AMB SUPPLY ORDER     Diagnosis: Obstructive Sleep Apnea ICD-10 Code (G47.33)    CPAP mask and supplies -  Patient preference, headgear, heated tubing, and filter;  heated humidifier. Wireless modem. Remote monitoring enrollment.  Oral/Nasal Combo Mask 1 every 3 months.  Oral Cushion Combo Mask (Replace) 2 per month.  Nasal Pillows Combo Mask (Replace) 2 per month.  Full Face Mask 1 every 3 months.  Full Face Mask Cushion 1 per month.  Nasal Cushion (Replace) 2 per month.  Nasal Pillows (Replace) 2 per month.  Nasal Interface Mask 1 every 3 months.  Headgear 1 every 6 months.  Chinstrap 1 every 6 months.  Tubing 1 every 3 months.  Filter(s) Disposable 2 per month.  Filter(s) Non-Disposable 1 every 6 months.  Oral Interface 1 every 3 months. [address] [facility] for Aletha Rowland (Replace) 1 every 6 months.  Tubing with heating element 1 every 3 months.                  [address] [place] [address] Demi Morris MD, [address] [facility], American Board of Sleep Medicine  NPI [id number]  Electronically signed 3/15/23       *A copy of compliance data was provided to the patient and reviewed in detail. *CPAP will be continued at the above pressure settings. The patient is to contact the office if there are problems with either mask or pressure settings. Follow-up will be scheduled at which time compliance data will be reviewed. * Patient has a history and examination consistent with the diagnosis of sleep apnea. * He was provided information on sleep apnea including corresponding risk factors and the importance of proper treatment. * Treatment options if indicated were reviewed today. Felisha Jimenez MD, FAA  Electronically signed 03/15/23        This note was created using voice recognition software. Despite editing, there may be syntax errors. This note will not be viewable in [address]

## 2023-03-19 ENCOUNTER — HOSPITAL ENCOUNTER (EMERGENCY)
Age: 78
Discharge: HOME | End: 2023-03-19
Attending: EMERGENCY MEDICINE
Payer: MEDICARE

## 2023-03-19 VITALS
HEIGHT: 67 IN | DIASTOLIC BLOOD PRESSURE: 93 MMHG | OXYGEN SATURATION: 95 % | TEMPERATURE: 97.5 F | HEART RATE: 68 BPM | SYSTOLIC BLOOD PRESSURE: 163 MMHG | BODY MASS INDEX: 32.65 KG/M2 | RESPIRATION RATE: 18 BRPM | WEIGHT: 208 LBS

## 2023-03-19 DIAGNOSIS — R25.2 CRAMPING OF HANDS: ICD-10-CM

## 2023-03-19 DIAGNOSIS — I10 ESSENTIAL HYPERTENSION: ICD-10-CM

## 2023-03-19 DIAGNOSIS — R25.2 LEG CRAMPING: Primary | ICD-10-CM

## 2023-03-19 LAB
ANION GAP SERPL CALC-SCNC: 1 MMOL/L (ref 5–15)
BUN SERPL-MCNC: 18 MG/DL (ref 6–20)
BUN/CREAT SERPL: 19 (ref 12–20)
CALCIUM SERPL-MCNC: 8.2 MG/DL (ref 8.5–10.1)
CHLORIDE SERPL-SCNC: 113 MMOL/L (ref 97–108)
CO2 SERPL-SCNC: 23 MMOL/L (ref 21–32)
COMMENT, HOLDF: NORMAL
COMMENT, HOLDF: NORMAL
CREAT SERPL-MCNC: 0.96 MG/DL (ref 0.7–1.3)
GLUCOSE SERPL-MCNC: 107 MG/DL (ref 65–100)
MAGNESIUM SERPL-MCNC: 2.1 MG/DL (ref 1.6–2.4)
MAGNESIUM SERPL-MCNC: NORMAL MG/DL (ref 1.6–2.4)
MAGNESIUM SERPL-MCNC: NORMAL MG/DL (ref 1.6–2.4)
POTASSIUM SERPL-SCNC: 4.4 MMOL/L (ref 3.5–5.1)
POTASSIUM SERPL-SCNC: ABNORMAL MMOL/L (ref 3.5–5.1)
POTASSIUM SERPL-SCNC: NORMAL MMOL/L (ref 3.5–5.1)
SAMPLES BEING HELD,HOLD: NORMAL
SAMPLES BEING HELD,HOLD: NORMAL
SODIUM SERPL-SCNC: 137 MMOL/L (ref 136–145)

## 2023-03-19 PROCEDURE — 84132 ASSAY OF SERUM POTASSIUM: CPT

## 2023-03-19 PROCEDURE — 36415 COLL VENOUS BLD VENIPUNCTURE: CPT

## 2023-03-19 PROCEDURE — 80048 BASIC METABOLIC PNL TOTAL CA: CPT

## 2023-03-19 PROCEDURE — 83735 ASSAY OF MAGNESIUM: CPT

## 2023-03-19 PROCEDURE — 99283 EMERGENCY DEPT VISIT LOW MDM: CPT

## 2023-03-19 NOTE — ED TRIAGE NOTES
Pt ambulatory to triage w/ c/o elevated BP. Pt reports that it has been above the 170s all day and the highest he took around 2330 was 209/112. Pt also reports having right leg cramping.

## 2023-03-20 ENCOUNTER — TELEPHONE (OUTPATIENT)
Dept: INTERNAL MEDICINE CLINIC | Age: 78
End: 2023-03-20

## 2023-03-20 RX ORDER — TAMSULOSIN HYDROCHLORIDE 0.4 MG/1
0.4 CAPSULE ORAL DAILY
Qty: 90 CAPSULE | Refills: 0 | Status: SHIPPED | OUTPATIENT
Start: 2023-03-20

## 2023-03-20 RX ORDER — AMLODIPINE BESYLATE 5 MG/1
5 TABLET ORAL DAILY
Qty: 30 TABLET | Refills: 0 | Status: SHIPPED | OUTPATIENT
Start: 2023-03-20

## 2023-03-20 NOTE — TELEPHONE ENCOUNTER
Spoke with patient and he reports his high blood high pressure that was 209/112 and sent him to the [facility] ER 3/19/23. He was told to follow up with PCP. Scheduled 3/23/23 at 0940 AM.  Advised to monitor his B/P's and Pulse twice daily and report to Dr. Manuel Oh results. He states understanding and Grateful for the call. Dr. Jackson Courts notified.

## 2023-03-20 NOTE — TELEPHONE ENCOUNTER
Patient would like a call back to discuss his high blood pressure that was 209/112 and sent him to the ER Saturday night. Patient would like a call back to discuss any medication changes that might need to be made. Patient is also requesting a refill of tamsulosin.

## 2023-03-20 NOTE — ED PROVIDER NOTES
The history is provided by the patient. Hypertension   This is a chronic problem. Pertinent negatives include no chest pain and no shortness of breath. Associated symptoms comments: Leg cramps. Past Medical History:   Diagnosis Date    Abnormal celiac antibody panel 01/2017    tolerates gluten    Adverse effect of anesthesia 09/2020    could not urinate post procedure - catheter for over a week    Ankle fracture, left 6/29/15    Dr. Sloane Rivers. and prox fibula. s/p surgery    AR (allergic rhinitis)     Arthritis     ritesh hands    Pool esophagus     Dr. Florin Gray, Dr. Caty Rea. EGD 5/2020 intramural adenocarcinoma. repeat endoscopy 5/2018 (low grade). Dr. Caty Rea    Chronic sinus complaints     COVID-19 vaccination declined     Cyst of left kidney     left. stable mid-pole cyst 7/2020    Elevated prostate specific antigen (PSA)     Dr. Ileana Tyson. Dr. Halle Moran peak 14.3, bx 5/2008, 2006    Enlarged prostate     Esophageal adenocarcinoma ([address] 05/2020    intramural, non-invasive. non-virualed seen on biopsies. s/p albations w HALO 5/2020, 6/2020, 9/2020    Esophageal motility disorder 01/2017    +hiatal hernia    GERD (gastroesophageal reflux disease)     Hearing aid worn     hearing test 4/2009- bilat. HTN (hypertension)     diet treated, stress echo 12/07 nl    Kidney cysts     left. s/p biopsy 2018 Dr. Ileana Tysno    Left leg paresthesias     medical left leg. since left leg fracuture 6/2015    Normal cardiac stress test 03/07/2017    Onychomycosis     CLARICE (obstructive sleep apnea) 2003    Dr. Martin Carter. wears CPAP at night setting 8.5    Panic attacks 2002    work-related    Pleurisy     Pulmonary nodule, right 07/14/2020    4 mm RLL pleural based nodule. CT [facility] new compared to 8/13/18.     Right inguinal hernia     Sinus bradycardia     Squamous cell carcinoma 1989    left temple Dr. Bernardo Barker    UTI (urinary tract infection) 04/2008       Past Surgical History:   Procedure Laterality Date COLONOSCOPY      normal except hemorrhoids    HX ANKLE FRACTURE TX Left 6/29/15    Dr. Ginny Avila    HX COLONOSCOPY  10/3/14    1 hyperplastic polyp. Milka    HX ENDOSCOPY  10/02/2018    HX ENDOSCOPY  2018    HX ENDOSCOPY  05/15/2017    HX ENDOSCOPY  10/02/2014    HX ENDOSCOPY  2021    HX ENDOSCOPY  2020    HX ENDOSCOPY  2022    Dr Noel Mancuso. appeared Pool's.  benign bx    HX HERNIA REPAIR Bilateral 2014    Dr. Jaime Gillis [facility]      Laparoscopic bilateral inguinal hernia repair with mesh    HX TONSILLECTOMY      PROSTATE BIOPSIES  ,     saw Dr. Shoshana Mcgee.   Now Dr. 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 )   Tobacco Use    Smoking status: Former     Packs/day: 1.00     Years: 10.00     Pack years: 10.00     Types: Cigarettes, Cigars     Quit date: 1978     Years since quittin.2    Smokeless tobacco: Former   Vaping Use    Vaping Use: Never used   Substance and Sexual Activity    Alcohol use: Not Currently     Alcohol/week: 3.0 standard drinks     Types: 3 Standard drinks or equivalent per week     Comment: no drinking in over 2 years    Drug use: No    Sexual activity: Never   Other Topics Concern    Not on file   Social History Narrative    Not on file     Social Determinants of Health     Financial Resource Strain: Not on file   Food Insecurity: Not on file   Transportation Needs: Not on file   Physical Activity: Not on file   Stress: Not on file   Social Connections: Not on file   Intimate Partner Violence: Not on file   Housing Stability: Not on file         ALLERGIES: Patient has no known allergies. Review of Systems   Respiratory:  Negative for shortness of breath. Cardiovascular:  Negative for chest pain. Vitals:    03/19/23 0056 03/19/23 0100 03/19/23 0104 03/19/23 0141   BP: (!) 181/89 (!) 177/85  (!) 163/93   Pulse:       Resp:       Temp:       SpO2: 97% 96% 96% 95%   Weight:       Height:                Physical Exam  Vitals and nursing note reviewed. Constitutional:       General: He is not in acute distress. Appearance: He is well-developed. HENT:      Head: Normocephalic and atraumatic. Eyes:      Conjunctiva/sclera: Conjunctivae normal.   Neck:      Trachea: No tracheal deviation. Cardiovascular:      Rate and Rhythm: Normal rate and regular rhythm. Pulmonary:      Effort: Pulmonary effort is normal. No respiratory distress. Abdominal:      General: There is no distension. Musculoskeletal:         General: No deformity. Normal range of motion. Cervical back: Neck supple. Skin:     General: Skin is warm and dry. Neurological:      Mental Status: He is alert. Cranial Nerves: No cranial nerve deficit. Psychiatric:         Behavior: Behavior normal.        Medical Decision Making  70 y.o. male presents with asymptomatic hypertension. No signs or symptoms of end-organ damage. Discussed outpatient follow up for medical management and return precautions discussed for new or concerning symptoms. Has had intermittent leg cramping but no metabolic cause identified and no suspicion of rhabdo without persistent pain. Problems Addressed:  Cramping of hands: acute illness or injury  Essential hypertension: chronic illness or injury with exacerbation, progression, or side effects of treatment  Leg cramping: acute illness or injury    Amount and/or Complexity of Data Reviewed  Labs: ordered.  Decision-making details documented in ED Course. Risk  Prescription drug management.            Procedures

## 2023-03-20 NOTE — TELEPHONE ENCOUNTER
Spoke with patient and updated on Dr. Roxi Shculer comments dn script sent to pharmacy for Amlodipine for HTN to take with his Irbesartan. He states understanding and grateful for the call.

## 2023-03-21 ENCOUNTER — PATIENT OUTREACH (OUTPATIENT)
Dept: CASE MANAGEMENT | Age: 78
End: 2023-03-21

## 2023-03-21 NOTE — PROGRESS NOTES
3/21/2023  2:51 PM    Patient outreach attempt by this ACM today to perform CCM assessment/follow up. Attempts made to contact pt via both phone numbers on file, however neither would go through - messaging stating call cannot be completed as dialed and numbers are not in service. No further outreach planned.     Adriane Mayfield RN, BSN - Ambulatory Care Manager  [phone]

## 2023-03-23 ENCOUNTER — OFFICE VISIT (OUTPATIENT)
Dept: INTERNAL MEDICINE CLINIC | Age: 78
End: 2023-03-23
Attending: INTERNAL MEDICINE
Payer: MEDICARE

## 2023-03-23 ENCOUNTER — HOSPITAL ENCOUNTER (OUTPATIENT)
Dept: GENERAL RADIOLOGY | Age: 78
Discharge: HOME | End: 2023-03-23
Attending: INTERNAL MEDICINE
Payer: MEDICARE

## 2023-03-23 VITALS
DIASTOLIC BLOOD PRESSURE: 82 MMHG | RESPIRATION RATE: 16 BRPM | OXYGEN SATURATION: 97 % | TEMPERATURE: 97.6 F | BODY MASS INDEX: 32.9 KG/M2 | SYSTOLIC BLOOD PRESSURE: 128 MMHG | WEIGHT: 209.6 LBS | HEIGHT: 67 IN | HEART RATE: 68 BPM

## 2023-03-23 DIAGNOSIS — R19.5 LOOSE STOOLS: ICD-10-CM

## 2023-03-23 DIAGNOSIS — I10 PRIMARY HYPERTENSION: Primary | ICD-10-CM

## 2023-03-23 DIAGNOSIS — M25.472 ANKLE EDEMA, BILATERAL: ICD-10-CM

## 2023-03-23 DIAGNOSIS — J30.1 ALLERGIC RHINITIS DUE TO POLLEN, UNSPECIFIED SEASONALITY: ICD-10-CM

## 2023-03-23 DIAGNOSIS — R10.11 RUQ ABDOMINAL PAIN: ICD-10-CM

## 2023-03-23 DIAGNOSIS — M25.471 ANKLE EDEMA, BILATERAL: ICD-10-CM

## 2023-03-23 DIAGNOSIS — R06.09 DOE (DYSPNEA ON EXERTION): ICD-10-CM

## 2023-03-23 PROBLEM — C15.9 ESOPHAGEAL ADENOCARCINOMA (HCC): Status: RESOLVED | Noted: 2020-05-01 | Resolved: 2023-03-23

## 2023-03-23 PROCEDURE — G0463 HOSPITAL OUTPT CLINIC VISIT: HCPCS | Performed by: INTERNAL MEDICINE

## 2023-03-23 PROCEDURE — 71046 X-RAY EXAM CHEST 2 VIEWS: CPT

## 2023-03-23 RX ORDER — MINERAL OIL
180 ENEMA (ML) RECTAL DAILY
Qty: 30 TABLET | Refills: 5
Start: 2023-03-23

## 2023-03-23 NOTE — PROGRESS NOTES
HISTORY OF PRESENT ILLNESS    Chief Complaint   Patient presents with    ED Follow-up     Hypertension - [facility] 3.19.23       Presents for follow-up of ER visit 3/19/23 for muscle cramps and severe HTN. BP as high was 209/112. He felt s/s of right back and leg cramps, back pains. Labs showed normal lyte,, renal fxn  Reports compliance with irbesartan 300 mg. Admits to high salt diet which he has improved. Denies chronic edema but has mild line on sock, headache, chest pain. Glucose 98. Report dyspnea walking up steps lately. No chest pain. No past significant cardiovascular history. EKGs have previously been normal.  Stress test March 2017 was normal.    In addition, has noted increasing right upper quadrant discomfort at times. Symptoms seem worse after eating fatty foods. He does have a history of localized/cured esophageal cancer, esophageal motility disorder, GERD. CT September 2020 showed normal gallbladder. Review of Systems   All other systems reviewed and are negative, except as noted in HPI    Past Medical and Surgical History   has a past medical history of Abnormal celiac antibody panel (01/2017), Adverse effect of anesthesia (09/2020), Ankle fracture, left (6/29/15), AR (allergic rhinitis), Arthritis, Pool esophagus, Chronic sinus complaints, COVID-19 vaccination declined, Cyst of left kidney, Elevated prostate specific antigen (PSA), Enlarged prostate, Esophageal adenocarcinoma ([address] (05/2020), Esophageal motility disorder (01/2017), GERD (gastroesophageal reflux disease), Hearing aid worn, HTN (hypertension), Kidney cysts, Left leg paresthesias, Normal cardiac stress test (03/07/2017), Onychomycosis, CLARICE (obstructive sleep apnea) (2003), Panic attacks (2002), Pleurisy, Pulmonary nodule, right (07/14/2020), Right inguinal hernia, Sinus bradycardia, Squamous cell carcinoma (1989), and UTI (urinary tract infection) (04/2008).     He has no past medical history of Diabetes ([address] Difficult intubation, Malignant hyperthermia due to anesthesia, Nausea & vomiting, or Pseudocholinesterase deficiency. has a past surgical history that includes colonoscopy (2002); prostate biopsies (2006, 2008); hx colonoscopy (10/3/14); hx endoscopy (10/02/2018); hx endoscopy (05/21/2018); hx endoscopy (05/15/2017); hx endoscopy (10/02/2014); hx tonsillectomy; hx hernia repair (Bilateral, 12/19/2014); hx ankle fracture tx (Left, 6/29/15); hx other surgical (); hx endoscopy (01/21/2021); hx endoscopy (09/02/2020); and hx endoscopy (02/11/2022). reports that he quit smoking about 45 years ago. His smoking use included cigarettes and cigars. He has a 10.00 pack-year smoking history. He has quit using smokeless tobacco. He reports that he does not currently use alcohol after a past usage of about 3.0 standard drinks per week. He reports that he does not use drugs. family history includes Cancer in his mother; Celiac Disease in his daughter and mother; Diabetes in his father; Heart Disease in his father and maternal grandmother; Hypertension in his father. Physical Exam   Nursing note and vitals reviewed. Blood pressure 128/82, pulse 68, temperature 97.6 °F (36.4 °C), temperature source Oral, resp. rate 16, height 5' 7\" (1.702 m), weight 209 lb 9.6 oz (95.1 kg), SpO2 97 %. Constitutional:  No distress. Eyes: Conjunctivae are normal.   Ears:  Hearing grossly intact  Cardiovascular: Normal rate. regular rhythm, no murmurs or gallops  No edema  Pulmonary/Chest: Effort normal.   CTAB  Musculoskeletal: moves all 4 extremities   Neurological: Alert and oriented to person, place, and time. Skin: No visible rash noted. Psychiatric: Normal mood and affect. Behavior is normal.     Assessment and Plan    Diagnoses and all orders for this visit:    1. Primary hypertension  Blood pressure is currently well controlled on irbesartan, amlodipine.   Continue.  -     REFERRAL TO CARDIOLOGY  -     [facility] 3RD GENERATION; Future  -     LIPID PANEL; Future  -     METABOLIC PANEL, COMPREHENSIVE; Future    2. Ankle edema, bilateral  Very mild chronic edema. Will monitor. Side effects of amlodipine contributing. Will establish with cardiology. No indication for diuretic at this point.  -     REFERRAL TO CARDIOLOGY  -     [facility] 3RD GENERATION; Future  -     CBC WITH AUTOMATED DIFF; Future    3. DOYLE (dyspnea on exertion)  Recent dyspnea on exertion. I do think cardiovascular evaluation is warranted. His wife sees Dr. Trace Leung and he will consult him as well. Chest x-ray ordered today which does appear normal  -     XR CHEST PA LAT; Future  -     REFERRAL TO CARDIOLOGY  -     CBC WITH AUTOMATED DIFF; Future  -     METABOLIC PANEL, COMPREHENSIVE; Future    4. RUQ abdominal pain  Right upper quadrant pain. Intermittent. Associated with fatty meals. Loose stools as well. Need to evaluate for gallbladder disorder. Ultrasound ordered. May consult with general surgery. Consider repeat upper endoscopy, but this was just done 1 year ago. Benign Pool's. -     [facility]; Future    5. Loose stools  Intermittent. Colonoscopy was last done 2014. Could consider repeat. Evaluate gallbladder as above. 6. Allergic rhinitis due to pollen, unspecified seasonality  Uncontrolled. Start Allegra. -     fexofenadine (ALLEGRA) 180 mg tablet; Take 1 Tablet by mouth daily. lab results and schedule of future lab studies reviewed with patient  reviewed medications and side effects in detail    Return to clinic for further evaluation if new symptoms develop        Current Outpatient Medications   Medication Sig    fexofenadine (ALLEGRA) 180 mg tablet Take 1 Tablet by mouth daily. tamsulosin (FLOMAX) 0.4 mg capsule Take 1 Capsule by mouth daily. amLODIPine (NORVASC) 5 mg tablet Take 1 Tablet by mouth daily.  Indications: high blood pressure    irbesartan (AVAPRO) 300 mg tablet TAKE 1 TABLET BY MOUTH EVERY NIGHT    zinc sulfate (ZINC-15 PO) Take  by mouth daily. cholecalciferol (VITAMIN D3) (2,000 UNITS /50 MCG) cap capsule Take 1 Cap by mouth daily. omeprazole (PRILOSEC) 40 mg capsule TK 1 C PO BID    SAW PALMETTO PO Take 1 Tab by mouth two (2) times a day. cpap machine kit by Does Not Apply route. No current facility-administered medications for this visit.

## 2023-03-24 ENCOUNTER — TELEPHONE (OUTPATIENT)
Dept: INTERNAL MEDICINE CLINIC | Age: 78
End: 2023-03-24

## 2023-03-24 ENCOUNTER — HOSPITAL ENCOUNTER (OUTPATIENT)
Dept: ULTRASOUND IMAGING | Age: 78
Discharge: HOME | End: 2023-03-24
Attending: INTERNAL MEDICINE
Payer: MEDICARE

## 2023-03-24 DIAGNOSIS — R10.11 RUQ ABDOMINAL PAIN: ICD-10-CM

## 2023-03-24 PROCEDURE — 76705 ECHO EXAM OF ABDOMEN: CPT

## 2023-03-24 NOTE — TELEPHONE ENCOUNTER
T/C to patient to update on referral faxed [phone] to Dr. Papi Bailey (Cardiologist) for his HTN, Edema, DOYLE. Advised to call and schedule an appt at [phone]. No answer and LVM.

## 2023-03-29 ENCOUNTER — OFFICE VISIT (OUTPATIENT)
Dept: SLEEP MEDICINE | Age: 78
End: 2023-03-29

## 2023-03-29 DIAGNOSIS — G47.33 OSA (OBSTRUCTIVE SLEEP APNEA): Primary | ICD-10-CM

## 2023-04-19 RX ORDER — AMLODIPINE BESYLATE 5 MG/1
5 TABLET ORAL DAILY
Qty: 90 TABLET | Refills: 0 | Status: SHIPPED | OUTPATIENT
Start: 2023-04-19

## 2023-05-12 ENCOUNTER — PATIENT MESSAGE (OUTPATIENT)
Age: 78
End: 2023-05-12

## 2023-05-12 DIAGNOSIS — K80.20 CALCULUS OF GALLBLADDER WITHOUT CHOLECYSTITIS WITHOUT OBSTRUCTION: Primary | ICD-10-CM

## 2023-05-12 NOTE — TELEPHONE ENCOUNTER
From: Edith Christopher  To: Dr. Smith Rodney: 5/12/2023 6:42 AM EDT  Subject: Feng Dias,    Dr Heaven Espinosa,    I am still having pain from my gall bladder and also the test you sent me for said I have a gall stone. I need to find out what is the next step you recommend for dealing with this. If I have to have it removed, then I need to get it done soon since I am supposed to be in [facility] with my family in September.     Thank you   Colletta Baton

## 2023-05-22 ENCOUNTER — TELEPHONE (OUTPATIENT)
Age: 78
End: 2023-05-22

## 2023-05-22 NOTE — TELEPHONE ENCOUNTER
Patient requested that a copy of ultrasound report done at [facility] for gallbladder sent to surgeon    Fax to:  Dr. Marco Huerta  [phone]

## 2023-05-22 NOTE — TELEPHONE ENCOUNTER
Faxed a copy of ultrasound report done at [facility] for gallbladder sent to surgeon     Fax to:  Dr. Any Martin  [phone]

## 2023-06-14 PROBLEM — N13.8 BENIGN PROSTATIC HYPERPLASIA WITH URINARY OBSTRUCTION: Status: ACTIVE | Noted: 2023-06-14

## 2023-06-14 PROBLEM — N40.1 BENIGN PROSTATIC HYPERPLASIA WITH URINARY OBSTRUCTION: Status: ACTIVE | Noted: 2023-06-14

## 2023-06-19 ENCOUNTER — HOSPITAL ENCOUNTER (OUTPATIENT)
Facility: HOSPITAL | Age: 78
Discharge: HOME | End: 2023-06-22
Attending: INTERNAL MEDICINE
Payer: MEDICARE

## 2023-06-19 DIAGNOSIS — R10.31 RIGHT INGUINAL PAIN: ICD-10-CM

## 2023-06-19 DIAGNOSIS — Z87.19 HISTORY OF RIGHT INGUINAL HERNIA: ICD-10-CM

## 2023-06-19 PROCEDURE — 76882 US LMTD JT/FCL EVL NVASC XTR: CPT

## 2023-07-13 DIAGNOSIS — I10 PRIMARY HYPERTENSION: ICD-10-CM

## 2023-07-13 RX ORDER — AMLODIPINE BESYLATE 5 MG/1
5 TABLET ORAL DAILY
Qty: 90 TABLET | Refills: 1 | Status: SHIPPED | OUTPATIENT
Start: 2023-07-13

## 2023-09-14 ENCOUNTER — PROCEDURE VISIT (OUTPATIENT)
Age: 78
End: 2023-09-14

## 2023-09-14 DIAGNOSIS — G47.33 OSA (OBSTRUCTIVE SLEEP APNEA): Primary | ICD-10-CM

## 2023-09-14 NOTE — PROGRESS NOTES
Patient reports his Respironics Dreamstation 2 has been blowing abnormally high since he returned from [place] 9/7/23. Device settings were verified to be the same as prior to his trip. Monometer confirmed device was blowing at prescribed settings. Patient reports of no changes to mask fit or seal.     Patient will verify device is plugged directly into the wall outlet and to restart therapy tonight. He will contact the sleep center if he experiences any further complications with his CPAP therapy.

## 2023-09-17 RX ORDER — SODIUM CHLORIDE 9 MG/ML
INJECTION, SOLUTION INTRAVENOUS CONTINUOUS
Status: CANCELLED | OUTPATIENT
Start: 2023-09-17

## 2023-09-19 ENCOUNTER — ANESTHESIA (OUTPATIENT)
Facility: HOSPITAL | Age: 78
End: 2023-09-19
Payer: MEDICARE

## 2023-09-19 ENCOUNTER — ANESTHESIA EVENT (OUTPATIENT)
Facility: HOSPITAL | Age: 78
End: 2023-09-19
Payer: MEDICARE

## 2023-09-19 ENCOUNTER — HOSPITAL ENCOUNTER (OUTPATIENT)
Facility: HOSPITAL | Age: 78
Setting detail: OUTPATIENT SURGERY
Discharge: HOME | End: 2023-09-19
Attending: INTERNAL MEDICINE | Admitting: INTERNAL MEDICINE
Payer: MEDICARE

## 2023-09-19 VITALS
BODY MASS INDEX: 32.28 KG/M2 | DIASTOLIC BLOOD PRESSURE: 84 MMHG | TEMPERATURE: 98.7 F | HEART RATE: 66 BPM | OXYGEN SATURATION: 96 % | HEIGHT: 67 IN | SYSTOLIC BLOOD PRESSURE: 122 MMHG | WEIGHT: 205.69 LBS | RESPIRATION RATE: 18 BRPM

## 2023-09-19 PROCEDURE — 2709999900 HC NON-CHARGEABLE SUPPLY: Performed by: INTERNAL MEDICINE

## 2023-09-19 PROCEDURE — 3700000001 HC ADD 15 MINUTES (ANESTHESIA): Performed by: INTERNAL MEDICINE

## 2023-09-19 PROCEDURE — 2500000003 HC RX 250 WO HCPCS: Performed by: NURSE ANESTHETIST, CERTIFIED REGISTERED

## 2023-09-19 PROCEDURE — 6360000002 HC RX W HCPCS: Performed by: NURSE ANESTHETIST, CERTIFIED REGISTERED

## 2023-09-19 PROCEDURE — 3600007502: Performed by: INTERNAL MEDICINE

## 2023-09-19 PROCEDURE — 7100000010 HC PHASE II RECOVERY - FIRST 15 MIN: Performed by: INTERNAL MEDICINE

## 2023-09-19 PROCEDURE — 2580000003 HC RX 258: Performed by: NURSE ANESTHETIST, CERTIFIED REGISTERED

## 2023-09-19 PROCEDURE — 88312 SPECIAL STAINS GROUP 1: CPT

## 2023-09-19 PROCEDURE — 88305 TISSUE EXAM BY PATHOLOGIST: CPT

## 2023-09-19 PROCEDURE — 7100000011 HC PHASE II RECOVERY - ADDTL 15 MIN: Performed by: INTERNAL MEDICINE

## 2023-09-19 PROCEDURE — 3600007512: Performed by: INTERNAL MEDICINE

## 2023-09-19 PROCEDURE — 3700000000 HC ANESTHESIA ATTENDED CARE: Performed by: INTERNAL MEDICINE

## 2023-09-19 RX ORDER — LIDOCAINE HYDROCHLORIDE 20 MG/ML
INJECTION, SOLUTION EPIDURAL; INFILTRATION; INTRACAUDAL; PERINEURAL PRN
Status: DISCONTINUED | OUTPATIENT
Start: 2023-09-19 | End: 2023-09-19 | Stop reason: SDUPTHER

## 2023-09-19 RX ORDER — GLYCOPYRROLATE 0.2 MG/ML
INJECTION INTRAMUSCULAR; INTRAVENOUS PRN
Status: DISCONTINUED | OUTPATIENT
Start: 2023-09-19 | End: 2023-09-19 | Stop reason: SDUPTHER

## 2023-09-19 RX ORDER — PROPOFOL 10 MG/ML
INJECTION, EMULSION INTRAVENOUS PRN
Status: DISCONTINUED | OUTPATIENT
Start: 2023-09-19 | End: 2023-09-19 | Stop reason: SDUPTHER

## 2023-09-19 RX ORDER — SODIUM CHLORIDE 9 MG/ML
INJECTION, SOLUTION INTRAVENOUS CONTINUOUS PRN
Status: DISCONTINUED | OUTPATIENT
Start: 2023-09-19 | End: 2023-09-19 | Stop reason: SDUPTHER

## 2023-09-19 RX ADMIN — LIDOCAINE HYDROCHLORIDE 30 MG: 20 INJECTION, SOLUTION EPIDURAL; INFILTRATION; INTRACAUDAL; PERINEURAL at 14:19

## 2023-09-19 RX ADMIN — PROPOFOL 100 MG: 10 INJECTION, EMULSION INTRAVENOUS at 14:12

## 2023-09-19 RX ADMIN — PROPOFOL 50 MG: 10 INJECTION, EMULSION INTRAVENOUS at 14:15

## 2023-09-19 RX ADMIN — GLYCOPYRROLATE 0.2 MG: 0.2 INJECTION INTRAMUSCULAR; INTRAVENOUS at 14:10

## 2023-09-19 RX ADMIN — PROPOFOL 30 MG: 10 INJECTION, EMULSION INTRAVENOUS at 14:19

## 2023-09-19 RX ADMIN — SODIUM CHLORIDE: 9 INJECTION, SOLUTION INTRAVENOUS at 14:10

## 2023-09-19 ASSESSMENT — PAIN SCALES - GENERAL
PAINLEVEL_OUTOF10: 0
PAINLEVEL_OUTOF10: 0

## 2023-09-19 ASSESSMENT — PAIN - FUNCTIONAL ASSESSMENT: PAIN_FUNCTIONAL_ASSESSMENT: 0-10

## 2023-09-19 NOTE — H&P
Arabella Hernandez M.D.  [phone]    History and Physical       NAME:  Amrita Nava   :   1945   MRN:   [id number]           Consult Date: 2023 2:08 PM    Chief Complaint:  Bang's esophagus    History of Present Illness:  Patient is a 70 y.o. who is seen for Bang's esophagus surveillance. Denies any ongoing GI complaints. PMH:  Past Medical History:   Diagnosis Date    Abnormal celiac antibody panel 2017    tolerates gluten    Adverse effect of anesthesia 2020    could not urinate post procedure - catheter for over a week    Ankle fracture, left 2015    Dr. Aguilar Eirckson. and prox fibula. s/p surgery    AR (allergic rhinitis)     Arthritis     malik hands    Bang esophagus     Dr. Abhishek Antunez, Dr. Garrett Geronimo. EGD 2020 intramural adenocarcinoma. repeat endoscopy 2018 (low grade). Dr. Garrett Geronimo    Chronic sinus complaints     COVID-19 vaccination declined     Cyst of left kidney     left. stable mid-pole cyst 2020    Elevated prostate specific antigen (PSA)     Dr. Taya De La Paz. Dr. Rigo Valera peak 14.3, bx 2008,     Enlarged prostate     Esophageal adenocarcinoma ([address]) 2020    intramural, non-invasive. non-virualed seen on biopsies. s/p albations w HALO 2020, 2020, 2020    Esophageal motility disorder 2017    +hiatal hernia    GERD (gastroesophageal reflux disease)     Hearing aid worn     hearing test 2009- bilat. HTN (hypertension)     diet treated, stress echo  nl    Kidney cysts     left. s/p biopsy  Dr. Taya De La Paz    Left leg paresthesias     medical left leg. since left leg fracuture 2015    Normal cardiac stress test 2017    Onychomycosis     SUZI (obstructive sleep apnea)     Dr. Marium Saleh. Saw Dr. Karma Spear. wears CPAP at night setting 8.5    Panic attacks 2002    work-related    Pleurisy     Pulmonary nodule, right 2020    4 mm RLL pleural based nodule. CT 20 [facility]. new compared to 18.     Right

## 2023-09-19 NOTE — ANESTHESIA PRE PROCEDURE
breath sounds clear to auscultation  (+) sleep apnea:                             Cardiovascular:  Exercise tolerance: good (>4 METS),   (+) hypertension:,       NYHA Classification: I    Rhythm: regular  Rate: normal           Beta Blocker:  Not on Beta Blocker         Neuro/Psych:               GI/Hepatic/Renal:   (+) GERD:, renal disease:,           Endo/Other:              Pt had no PAT visit       Abdominal:             Vascular: Other Findings:           Anesthesia Plan      MAC     ASA 2       Induction: intravenous. MIPS: Postoperative opioids intended and Prophylactic antiemetics administered. Anesthetic plan and risks discussed with patient. Plan discussed with CRNA.                     Malaika Garcia MD   9/19/2023

## 2023-09-19 NOTE — ANESTHESIA POSTPROCEDURE EVALUATION
Department of Anesthesiology  Postprocedure Note    Patient: Sohail Valdovinos  MRN: [id number]  YOB: 1945  Date of evaluation: 9/19/2023      Procedure Summary     Date: 09/19/23 Room / Location: [place] [address] / [facility] ENDOSCOPY    Anesthesia Start: 1410 Anesthesia Stop: 1426    Procedures:       ESOPHAGOGASTRODUODENOSCOPY (Upper GI Region)      EGD BIOPSY (Upper GI Region) Diagnosis:       Bang's esophagus without dysplasia      (Bang's esophagus without dysplasia [K22.70])    Surgeons: Cary Vegas MD Responsible Provider: Neyda Downs MD    Anesthesia Type: MAC ASA Status: 2          Anesthesia Type: MAC    Jimena Phase I: Jimena Score: 10    Jimena Phase II:        Anesthesia Post Evaluation    Patient location during evaluation: PACU  Patient participation: complete - patient participated  Level of consciousness: awake  Pain score: 0  Airway patency: patent  Nausea & Vomiting: no nausea and no vomiting  Complications: no  Cardiovascular status: blood pressure returned to baseline  Respiratory status: acceptable  Hydration status: euvolemic  Pain management: adequate

## 2023-09-19 NOTE — PROGRESS NOTES
Rebekah Tuttle  1945  [id number]    Situation:  Verbal report received from: Jon Snyder RN   Procedure: Procedure(s):  ESOPHAGOGASTRODUODENOSCOPY  EGD BIOPSY    Background:    Preoperative diagnosis: Bang's esophagus without dysplasia [K22.70]  Postoperative diagnosis: * No post-op diagnosis entered *    :  Dr. Jenn Campos  Assistant(s): Circulator: Lemuel Galan RN  Endoscopy Technician: Rogers Oppenheim    Specimens:   ID Type Source Tests Collected by Time Destination   A : Esophageal bx @36cm Tissue Esophagus SURGICAL PATHOLOGY Beto Berman MD 9/19/2023 1420    B : Esophageal bx @34cm Tissue Esophagus SURGICAL PATHOLOGY Beto Berman MD 9/19/2023 1421    C : Esophageal bx @32cm Tissue Esophagus SURGICAL PATHOLOGY Beto Berman MD 9/19/2023 1422      H. Pylori    no    Assessment:  Intra-procedure medications     Anesthesia gave intra-procedure sedation and medications, see anesthesia flow sheet   yes    Intravenous fluids: NS@ KVO     Vital signs stable   yes    Abdominal assessment: round and soft   yes    Recommendation:  Discharge patient per MD order  yes.   Return to floor outpatient  Family or Friend   family   Permission to share finding with family or friend   yes

## 2023-09-19 NOTE — DISCHARGE INSTRUCTIONS
Pete Alvarez M.D.  [phone]           2023  Flori Corrales  :  1945  [facility] Medical Record Number:  [id number]        ENDOSCOPY FINDINGS:   Your endoscopy showed improved Bang's esophagus and small hiatal hernia. Biopsies were obtained. EGD DISCHARGE INSTRUCTIONS    DISCOMFORT:  Sore throat- throat lozenges or warm salt water gargle  redness at IV site- apply warm compress to area; if redness or soreness persist- contact your physician  Gaseous discomfort- walking, belching will help relieve any discomfort  You may not operate a vehicle for 12 hours  You may not engage in an occupation involving machinery or appliances for rest of today  You may not drink alcoholic beverages for at least 12 hours  Avoid making any critical decisions for at least 24 hour    DIET:   You may resume your regular diet. ACTIVITY  Spend the remainder of the day resting -  avoid any strenuous activity. Avoid driving or operating machinery. CALL M.D. ANY SIGN OF   Increasing pain, nausea, vomiting  Abdominal distension (swelling)  New increased bleeding (oral or rectal)  Fever (chills)  Pain in chest area  Bloody discharge from nose or mouth  Shortness of breath    Follow-up Instructions:   Call Dr. Janet Cummings for any questions or problems. Telephone # [phone]  Biopsies were obtained, the results will be available  in  5 to 7 days. We will call you to notify you of these results. Continue same medications. Follow up in the office in the next few months.

## 2023-09-19 NOTE — OP NOTE
Lis Skinner M.D.  [phone]           2023                EGD Operative Report  Katya Zapata  :  1945  [facility] Medical Record Number:  [id number]      Indication: Bang's/esophageal ulcer    : Maribel Rutherford MD    Referring Provider:  Aretha Don MD      Anesthesia/Sedation:  MAC anesthesia    Airway assessment: No airway problems anticipated    Pre-Procedural Exam:      Airway: clear, no airway problems anticipated  Heart: RRR, without gallops or rubs  Lungs: clear bilaterally without wheezes, crackles, or rhonchi  Abdomen: soft, nontender, nondistended, bowel sounds present  Mental Status: awake, alert and oriented to person, place and time       Procedure Details     After infomed consent was obtained for the procedure, with all risks and benefits of procedure explained the patient was taken to the endoscopy suite and placed in the left lateral decubitus position. Following sequential administration of sedation as per above, the endoscope was inserted into the mouth and advanced under direct vision to second portion of the duodenum. A careful inspection was made as the gastroscope was withdrawn, including a retroflexed view of the proximal stomach; findings and interventions are described below. Findings:   Esophagus:Evidence of salmon colored mucosa noted in the distal esophagus, consistent with previously diagnosed Bang's esophagus, however appeared less expansive than previously seen. It was noted at 36 cm and extending as wide mucosal tongues to 32 cm, taking less than third of the circumference, C2M2  Stomach: Small hiatal hernia, otherwise mucosa within normal  Duodenum/jejunum: normal    Therapies:  none    Specimens:  Four quadrant biopsies were obtained at 36 cm, 34 cm and 32 cm           Complications:   None; patient tolerated the procedure well. EBL:  None.            Impression:    Bang's esophagus involving less esophageal

## 2023-09-19 NOTE — PROGRESS NOTES
Endoscopy discharge instructions have been reviewed and given to patient. The patient verbalized understanding and acceptance of instructions. Dr. Ramon Garrett  discussed with patient procedure findings and next steps.

## 2023-10-31 DIAGNOSIS — I10 PRIMARY HYPERTENSION: ICD-10-CM

## 2023-10-31 RX ORDER — IRBESARTAN 300 MG/1
300 TABLET ORAL NIGHTLY
Qty: 90 TABLET | Refills: 1 | Status: SHIPPED | OUTPATIENT
Start: 2023-10-31

## 2023-11-01 ENCOUNTER — APPOINTMENT (OUTPATIENT)
Facility: HOSPITAL | Age: 78
End: 2023-11-01
Payer: MEDICARE

## 2023-11-01 ENCOUNTER — HOSPITAL ENCOUNTER (EMERGENCY)
Facility: HOSPITAL | Age: 78
Discharge: HOME | End: 2023-11-01
Attending: STUDENT IN AN ORGANIZED HEALTH CARE EDUCATION/TRAINING PROGRAM
Payer: MEDICARE

## 2023-11-01 VITALS
WEIGHT: 205 LBS | RESPIRATION RATE: 16 BRPM | SYSTOLIC BLOOD PRESSURE: 135 MMHG | HEIGHT: 67 IN | HEART RATE: 87 BPM | DIASTOLIC BLOOD PRESSURE: 71 MMHG | OXYGEN SATURATION: 98 % | TEMPERATURE: 97.4 F | BODY MASS INDEX: 32.18 KG/M2

## 2023-11-01 DIAGNOSIS — R10.31 ABDOMINAL PAIN, RIGHT LOWER QUADRANT: Primary | ICD-10-CM

## 2023-11-01 DIAGNOSIS — R31.29 OTHER MICROSCOPIC HEMATURIA: ICD-10-CM

## 2023-11-01 DIAGNOSIS — N40.0 ENLARGED PROSTATE: ICD-10-CM

## 2023-11-01 LAB
ALBUMIN SERPL-MCNC: 3.3 G/DL (ref 3.5–5)
ALBUMIN/GLOB SERPL: 0.9 (ref 1.1–2.2)
ALP SERPL-CCNC: 149 U/L (ref 45–117)
ALT SERPL-CCNC: 30 U/L (ref 12–78)
ANION GAP SERPL CALC-SCNC: 3 MMOL/L (ref 5–15)
APPEARANCE UR: CLEAR
AST SERPL-CCNC: 21 U/L (ref 15–37)
BACTERIA URNS QL MICRO: NEGATIVE /HPF
BASOPHILS # BLD: 0 K/UL (ref 0–0.1)
BASOPHILS NFR BLD: 1 % (ref 0–1)
BILIRUB SERPL-MCNC: 0.5 MG/DL (ref 0.2–1)
BILIRUB UR QL: NEGATIVE
BUN SERPL-MCNC: 17 MG/DL (ref 6–20)
BUN/CREAT SERPL: 17 (ref 12–20)
CALCIUM SERPL-MCNC: 8.5 MG/DL (ref 8.5–10.1)
CHLORIDE SERPL-SCNC: 111 MMOL/L (ref 97–108)
CO2 SERPL-SCNC: 26 MMOL/L (ref 21–32)
COLOR UR: ABNORMAL
COMMENT:: NORMAL
CREAT SERPL-MCNC: 1.02 MG/DL (ref 0.7–1.3)
DIFFERENTIAL METHOD BLD: NORMAL
EOSINOPHIL # BLD: 0.1 K/UL (ref 0–0.4)
EOSINOPHIL NFR BLD: 2 % (ref 0–7)
EPITH CASTS URNS QL MICRO: ABNORMAL /LPF
ERYTHROCYTE [DISTWIDTH] IN BLOOD BY AUTOMATED COUNT: 13.7 % (ref 11.5–14.5)
GLOBULIN SER CALC-MCNC: 3.5 G/DL (ref 2–4)
GLUCOSE SERPL-MCNC: 118 MG/DL (ref 65–100)
GLUCOSE UR STRIP.AUTO-MCNC: NEGATIVE MG/DL
HCT VFR BLD AUTO: 40.9 % (ref 36.6–50.3)
HGB BLD-MCNC: 13.7 G/DL (ref 12.1–17)
HGB UR QL STRIP: ABNORMAL
HYALINE CASTS URNS QL MICRO: ABNORMAL /LPF (ref 0–2)
IMM GRANULOCYTES # BLD AUTO: 0 K/UL (ref 0–0.04)
IMM GRANULOCYTES NFR BLD AUTO: 0 % (ref 0–0.5)
KETONES UR QL STRIP.AUTO: NEGATIVE MG/DL
LACTATE BLD-SCNC: 1.45 MMOL/L (ref 0.4–2)
LEUKOCYTE ESTERASE UR QL STRIP.AUTO: NEGATIVE
LIPASE SERPL-CCNC: 28 U/L (ref 13–75)
LYMPHOCYTES # BLD: 1 K/UL (ref 0.8–3.5)
LYMPHOCYTES NFR BLD: 14 % (ref 12–49)
MCH RBC QN AUTO: 30.2 PG (ref 26–34)
MCHC RBC AUTO-ENTMCNC: 33.5 G/DL (ref 30–36.5)
MCV RBC AUTO: 90.1 FL (ref 80–99)
MONOCYTES # BLD: 0.6 K/UL (ref 0–1)
MONOCYTES NFR BLD: 9 % (ref 5–13)
NEUTS SEG # BLD: 5.6 K/UL (ref 1.8–8)
NEUTS SEG NFR BLD: 74 % (ref 32–75)
NITRITE UR QL STRIP.AUTO: NEGATIVE
NRBC # BLD: 0 K/UL (ref 0–0.01)
NRBC BLD-RTO: 0 PER 100 WBC
PH UR STRIP: 5 (ref 5–8)
PLATELET # BLD AUTO: 206 K/UL (ref 150–400)
PMV BLD AUTO: 9.8 FL (ref 8.9–12.9)
POTASSIUM SERPL-SCNC: 3.9 MMOL/L (ref 3.5–5.1)
PROT SERPL-MCNC: 6.8 G/DL (ref 6.4–8.2)
PROT UR STRIP-MCNC: NEGATIVE MG/DL
RBC # BLD AUTO: 4.54 M/UL (ref 4.1–5.7)
RBC #/AREA URNS HPF: ABNORMAL /HPF (ref 0–5)
SODIUM SERPL-SCNC: 140 MMOL/L (ref 136–145)
SP GR UR REFRACTOMETRY: 1.02 (ref 1–1.03)
SPECIMEN HOLD: NORMAL
URINE CULTURE IF INDICATED: ABNORMAL
UROBILINOGEN UR QL STRIP.AUTO: 1 EU/DL (ref 0.2–1)
WBC # BLD AUTO: 7.5 K/UL (ref 4.1–11.1)
WBC URNS QL MICRO: ABNORMAL /HPF (ref 0–4)

## 2023-11-01 PROCEDURE — 99285 EMERGENCY DEPT VISIT HI MDM: CPT

## 2023-11-01 PROCEDURE — 2580000003 HC RX 258: Performed by: STUDENT IN AN ORGANIZED HEALTH CARE EDUCATION/TRAINING PROGRAM

## 2023-11-01 PROCEDURE — 74177 CT ABD & PELVIS W/CONTRAST: CPT

## 2023-11-01 PROCEDURE — 83605 ASSAY OF LACTIC ACID: CPT

## 2023-11-01 PROCEDURE — 6360000004 HC RX CONTRAST MEDICATION: Performed by: STUDENT IN AN ORGANIZED HEALTH CARE EDUCATION/TRAINING PROGRAM

## 2023-11-01 PROCEDURE — 85025 COMPLETE CBC W/AUTO DIFF WBC: CPT

## 2023-11-01 PROCEDURE — 80053 COMPREHEN METABOLIC PANEL: CPT

## 2023-11-01 PROCEDURE — 36415 COLL VENOUS BLD VENIPUNCTURE: CPT

## 2023-11-01 PROCEDURE — 81001 URINALYSIS AUTO W/SCOPE: CPT

## 2023-11-01 PROCEDURE — 83690 ASSAY OF LIPASE: CPT

## 2023-11-01 RX ORDER — 0.9 % SODIUM CHLORIDE 0.9 %
1000 INTRAVENOUS SOLUTION INTRAVENOUS ONCE
Status: COMPLETED | OUTPATIENT
Start: 2023-11-01 | End: 2023-11-01

## 2023-11-01 RX ADMIN — SODIUM CHLORIDE 1000 ML: 9 INJECTION, SOLUTION INTRAVENOUS at 15:06

## 2023-11-01 RX ADMIN — IOPAMIDOL 100 ML: 755 INJECTION, SOLUTION INTRAVENOUS at 15:54

## 2023-11-01 ASSESSMENT — PAIN - FUNCTIONAL ASSESSMENT: PAIN_FUNCTIONAL_ASSESSMENT: 0-10

## 2023-11-01 ASSESSMENT — ENCOUNTER SYMPTOMS
SHORTNESS OF BREATH: 0
ABDOMINAL PAIN: 1
BACK PAIN: 0

## 2023-11-01 ASSESSMENT — PAIN DESCRIPTION - LOCATION
LOCATION: ABDOMEN
LOCATION: ABDOMEN

## 2023-11-01 ASSESSMENT — PAIN DESCRIPTION - DESCRIPTORS: DESCRIPTORS: SHARP

## 2023-11-01 ASSESSMENT — PAIN SCALES - GENERAL
PAINLEVEL_OUTOF10: 2
PAINLEVEL_OUTOF10: 4

## 2023-11-01 ASSESSMENT — PAIN DESCRIPTION - ORIENTATION: ORIENTATION: RIGHT;LOWER

## 2023-11-01 NOTE — DISCHARGE INSTRUCTIONS
You were seen in the emergency room for abdominal pain. The CAT scan showed a normal appendix. You did have an enlarged prostate. There was also some blood in your urine. You need to follow-up with your primary care doctor in about a week to have this rechecked.   Otherwise your work-up is reassuring, follow-up with your PCP, return as needed or if worsening condition

## 2023-11-01 NOTE — ED NOTES
Patient does not appear to be in any acute distress/shows no evidence of clinical instability at this time. Provider has reviewed discharge instructions with the patient/family. The patient/family verbalized understanding instructions as well as need for follow up for any further symptoms. Discharge papers given, education provided, and any questions answered. Patient discharged by provider.       Ean Garibay RN  11/01/23 [id number]

## 2023-11-01 NOTE — ED PROVIDER NOTES
10/02/2018    UPPER GASTROINTESTINAL ENDOSCOPY N/A 2023    ESOPHAGOGASTRODUODENOSCOPY performed by Lakshmi Hurley MD at [address] N/A 2023    EGD BIOPSY performed by Lakshmi Hurley MD at [address] [facility]       Previous Medications    ACETAMINOPHEN (TYLENOL) 500 MG TABLET    Take by mouth every 6 hours as needed    AMLODIPINE (NORVASC) 5 MG TABLET    Take 1 tablet by mouth daily    CHOLECALCIFEROL 50 MCG ( UT) CAPS    Take by mouth daily    IRBESARTAN (AVAPRO) 300 MG TABLET    Take 1 tablet by mouth nightly    OMEPRAZOLE (PRILOSEC) 40 MG DELAYED RELEASE CAPSULE    TK 1 C PO BID    SAW PALMETTO, SERENOA REPENS, (SAW PALMETTO PO)    Take 1 tablet by mouth 2 times daily    TAMSULOSIN (FLOMAX) 0.4 MG CAPSULE    Take 1 capsule by mouth daily       ALLERGIES     Patient has no known allergies.     FAMILY HISTORY       Family History   Problem Relation Age of Onset    Cancer Mother         leukemia    Heart Disease Maternal Grandmother     Celiac Disease Daughter     Heart Disease Father     Diabetes Father         age 58    Celiac Disease Mother     Hypertension Father           SOCIAL HISTORY       Social History     Socioeconomic History    Marital status:    Tobacco Use    Smoking status: Former     Packs/day: 1.00     Years: 13.00     Additional pack years: 0.00     Total pack years: 13.00     Types: Cigarettes     Quit date: 1978     Years since quittin.8    Smokeless tobacco: Former   Substance and Sexual Activity    Alcohol use: Not Currently     Alcohol/week: 3.0 standard drinks of alcohol    Drug use: No     Social Determinants of Health     Financial Resource Strain: Low Risk  (2023)    Overall Financial Resource Strain (CARDIA)     Difficulty of Paying Living Expenses: Not hard at all   Food Insecurity: No Food Insecurity (2023)    Hunger Vital Sign     Worried About Running Out of Food in the Last Year: Never true

## 2024-01-09 DIAGNOSIS — I10 PRIMARY HYPERTENSION: ICD-10-CM

## 2024-01-09 RX ORDER — AMLODIPINE BESYLATE 5 MG/1
5 TABLET ORAL DAILY
Qty: 90 TABLET | Refills: 1 | Status: SHIPPED | OUTPATIENT
Start: 2024-01-09

## 2024-03-13 ENCOUNTER — TELEPHONE (OUTPATIENT)
Age: 79
End: 2024-03-13

## 2024-03-23 DIAGNOSIS — I10 PRIMARY HYPERTENSION: ICD-10-CM

## 2024-03-24 RX ORDER — IRBESARTAN 300 MG/1
300 TABLET ORAL NIGHTLY
Qty: 90 TABLET | Refills: 1 | Status: SHIPPED | OUTPATIENT
Start: 2024-03-24

## 2024-04-03 ENCOUNTER — OFFICE VISIT (OUTPATIENT)
Age: 79
End: 2024-04-03
Payer: MEDICARE

## 2024-04-03 VITALS
DIASTOLIC BLOOD PRESSURE: 72 MMHG | HEART RATE: 68 BPM | OXYGEN SATURATION: 96 % | RESPIRATION RATE: 16 BRPM | TEMPERATURE: 98.3 F | HEIGHT: 67 IN | BODY MASS INDEX: 33.74 KG/M2 | SYSTOLIC BLOOD PRESSURE: 132 MMHG | WEIGHT: 215 LBS

## 2024-04-03 DIAGNOSIS — R06.09 DOE (DYSPNEA ON EXERTION): ICD-10-CM

## 2024-04-03 DIAGNOSIS — R60.0 EDEMA OF BOTH LEGS: Primary | ICD-10-CM

## 2024-04-03 DIAGNOSIS — Z90.49 HISTORY OF CHOLECYSTECTOMY: ICD-10-CM

## 2024-04-03 DIAGNOSIS — I10 PRIMARY HYPERTENSION: ICD-10-CM

## 2024-04-03 DIAGNOSIS — R19.5 LOOSE STOOLS: ICD-10-CM

## 2024-04-03 DIAGNOSIS — R60.0 EDEMA OF BOTH LEGS: ICD-10-CM

## 2024-04-03 DIAGNOSIS — R10.9 ABDOMINAL CRAMPING: ICD-10-CM

## 2024-04-03 PROCEDURE — 99214 OFFICE O/P EST MOD 30 MIN: CPT | Performed by: INTERNAL MEDICINE

## 2024-04-03 RX ORDER — MONTELUKAST SODIUM 4 MG/1
1 TABLET, CHEWABLE ORAL 2 TIMES DAILY
Qty: 60 TABLET | Refills: 3 | Status: SHIPPED | OUTPATIENT
Start: 2024-04-03

## 2024-04-03 RX ORDER — HYDROCHLOROTHIAZIDE 12.5 MG/1
12.5 CAPSULE, GELATIN COATED ORAL EVERY MORNING
Qty: 90 CAPSULE | Refills: 0 | Status: SHIPPED | OUTPATIENT
Start: 2024-04-03

## 2024-04-03 ASSESSMENT — PATIENT HEALTH QUESTIONNAIRE - PHQ9
SUM OF ALL RESPONSES TO PHQ QUESTIONS 1-9: 0
1. LITTLE INTEREST OR PLEASURE IN DOING THINGS: NOT AT ALL
2. FEELING DOWN, DEPRESSED OR HOPELESS: NOT AT ALL
SUM OF ALL RESPONSES TO PHQ QUESTIONS 1-9: 0
SUM OF ALL RESPONSES TO PHQ9 QUESTIONS 1 & 2: 0
SUM OF ALL RESPONSES TO PHQ QUESTIONS 1-9: 0
SUM OF ALL RESPONSES TO PHQ QUESTIONS 1-9: 0

## 2024-04-03 NOTE — PROGRESS NOTES
HISTORY OF PRESENT ILLNESS    Chief Complaint   Patient presents with    Hypertension     Patient reports elevated/low BP at home ranging from 160's-170's/90's and 90's/50's-40's mmHg. Patient admits feeling out of breath upon light exertion and occasional lightheadedness when his BP is low.     Leg Swelling     Patient reports intermittent swelling in bilateral lower extremities, mainly at morning or night time.     GI Problem     Changes in bowel movements for about 4 months       Presents for follow-up    Abdominal cramps at times.     Loose stools.  Has to get to the bathroom soon after eating.  This is new for the past 3 to 4 months.  Hx +gluten intolerance.  He is not strictly on a gluten-free diet.  Status post cholecystectomy 6/19/2023.    Reports intermittent swelling of his legs bilaterally.  Worse after standing.  Home blood pressure is very variable, sometimes in the 160s and sometimes in the 90s systolically.  Feels somewhat short of breath and dizzy with exertion when his blood pressure gets a little bit low.    He is taking irbesartan and amlodipine for hypertension.  Seeing Dr. Pope.  Stress echo 3/2023 normal.      Wt Readings from Last 3 Encounters:   04/03/24 97.5 kg (215 lb)   11/01/23 93 kg (205 lb)   09/19/23 93.3 kg (205 lb 11 oz)       Review of Systems   All other systems reviewed and are negative, except as noted in HPI    Past Medical and Surgical History   has a past medical history of Abnormal celiac antibody panel, Adverse effect of anesthesia, Ankle fracture, left, AR (allergic rhinitis), Arthritis, Bang esophagus, Chronic sinus complaints, COVID-19 vaccination declined, Cyst of left kidney, Elevated prostate specific antigen (PSA), Enlarged prostate, Esophageal adenocarcinoma (HCC), Esophageal motility disorder, GERD (gastroesophageal reflux disease), Hearing aid worn, HTN (hypertension), Kidney cysts, Left leg paresthesias, Normal cardiac stress test, Onychomycosis, SUZI

## 2024-04-04 LAB
ALBUMIN SERPL-MCNC: 3.2 G/DL (ref 3.5–5)
ALBUMIN/GLOB SERPL: 1 (ref 1.1–2.2)
ALP SERPL-CCNC: 162 U/L (ref 45–117)
ALT SERPL-CCNC: 33 U/L (ref 12–78)
ANION GAP SERPL CALC-SCNC: 0 MMOL/L (ref 5–15)
AST SERPL-CCNC: 28 U/L (ref 15–37)
BILIRUB SERPL-MCNC: 0.4 MG/DL (ref 0.2–1)
BUN SERPL-MCNC: 16 MG/DL (ref 6–20)
BUN/CREAT SERPL: 15 (ref 12–20)
CALCIUM SERPL-MCNC: 8.9 MG/DL (ref 8.5–10.1)
CHLORIDE SERPL-SCNC: 111 MMOL/L (ref 97–108)
CO2 SERPL-SCNC: 30 MMOL/L (ref 21–32)
COMMENT:: NORMAL
CREAT SERPL-MCNC: 1.08 MG/DL (ref 0.7–1.3)
ERYTHROCYTE [DISTWIDTH] IN BLOOD BY AUTOMATED COUNT: 13.7 % (ref 11.5–14.5)
GLOBULIN SER CALC-MCNC: 3.2 G/DL (ref 2–4)
GLUCOSE SERPL-MCNC: 100 MG/DL (ref 65–100)
HCT VFR BLD AUTO: 41.4 % (ref 36.6–50.3)
HGB BLD-MCNC: 13.2 G/DL (ref 12.1–17)
MCH RBC QN AUTO: 29.9 PG (ref 26–34)
MCHC RBC AUTO-ENTMCNC: 31.9 G/DL (ref 30–36.5)
MCV RBC AUTO: 93.7 FL (ref 80–99)
NRBC # BLD: 0 K/UL (ref 0–0.01)
NRBC BLD-RTO: 0 PER 100 WBC
NT PRO BNP: 146 PG/ML
PLATELET # BLD AUTO: 200 K/UL (ref 150–400)
PMV BLD AUTO: 11.7 FL (ref 8.9–12.9)
POTASSIUM SERPL-SCNC: 4.4 MMOL/L (ref 3.5–5.1)
PROT SERPL-MCNC: 6.4 G/DL (ref 6.4–8.2)
RBC # BLD AUTO: 4.42 M/UL (ref 4.1–5.7)
SODIUM SERPL-SCNC: 141 MMOL/L (ref 136–145)
SPECIMEN HOLD: NORMAL
WBC # BLD AUTO: 6 K/UL (ref 4.1–11.1)

## 2024-05-29 ENCOUNTER — CLINICAL DOCUMENTATION (OUTPATIENT)
Age: 79
End: 2024-05-29

## 2024-05-29 ENCOUNTER — TELEMEDICINE (OUTPATIENT)
Age: 79
End: 2024-05-29
Payer: MEDICARE

## 2024-05-29 DIAGNOSIS — G47.33 OSA (OBSTRUCTIVE SLEEP APNEA): Primary | ICD-10-CM

## 2024-05-29 DIAGNOSIS — I10 PRIMARY HYPERTENSION: ICD-10-CM

## 2024-05-29 PROCEDURE — 99213 OFFICE O/P EST LOW 20 MIN: CPT | Performed by: NURSE PRACTITIONER

## 2024-05-29 PROCEDURE — 1123F ACP DISCUSS/DSCN MKR DOCD: CPT | Performed by: NURSE PRACTITIONER

## 2024-05-29 PROCEDURE — G8417 CALC BMI ABV UP PARAM F/U: HCPCS | Performed by: NURSE PRACTITIONER

## 2024-05-29 PROCEDURE — G8427 DOCREV CUR MEDS BY ELIG CLIN: HCPCS | Performed by: NURSE PRACTITIONER

## 2024-05-29 PROCEDURE — 1036F TOBACCO NON-USER: CPT | Performed by: NURSE PRACTITIONER

## 2024-05-29 ASSESSMENT — SLEEP AND FATIGUE QUESTIONNAIRES
HOW LIKELY ARE YOU TO NOD OFF OR FALL ASLEEP WHEN YOU ARE A PASSENGER IN A CAR FOR AN HOUR WITHOUT A BREAK: SLIGHT CHANCE OF DOZING
HOW LIKELY ARE YOU TO NOD OFF OR FALL ASLEEP WHILE SITTING QUIETLY AFTER LUNCH WITHOUT ALCOHOL: MODERATE CHANCE OF DOZING
HOW LIKELY ARE YOU TO NOD OFF OR FALL ASLEEP IN A CAR, WHILE STOPPED FOR A FEW MINUTES IN TRAFFIC: WOULD NEVER DOZE
HOW LIKELY ARE YOU TO NOD OFF OR FALL ASLEEP WHILE SITTING AND TALKING TO SOMEONE: SLIGHT CHANCE OF DOZING
HOW LIKELY ARE YOU TO NOD OFF OR FALL ASLEEP WHILE SITTING AND READING: HIGH CHANCE OF DOZING
HOW LIKELY ARE YOU TO NOD OFF OR FALL ASLEEP WHILE SITTING INACTIVE IN A PUBLIC PLACE: SLIGHT CHANCE OF DOZING
HOW LIKELY ARE YOU TO NOD OFF OR FALL ASLEEP WHILE LYING DOWN TO REST IN THE AFTERNOON WHEN CIRCUMSTANCES PERMIT: HIGH CHANCE OF DOZING
HOW LIKELY ARE YOU TO NOD OFF OR FALL ASLEEP WHILE WATCHING TV: MODERATE CHANCE OF DOZING
ESS TOTAL SCORE: 13

## 2024-05-29 NOTE — PROGRESS NOTES
[address]   [place] [address]   Tel.  [phone]   Fax. [phone]  [address]   [place] [address]   Tel.  [phone]   Fax. [phone] [address] [place] [address]   Tel.  [phone]   Fax. [phone]     Marcio Douglas (: 1945) is a 79 y.o. male, established patient, seen for positive airway pressure follow-up, he was last seen by Dr. El on 3/2023, prior notes reviewed in detail. Initial diagnosis several years ago. HSAT in 2016 demonstrated severe sleep disordered breathing characterized by an overall AHI of 30.2/h associated with minimal SaO2 of 82%.  APAP 4-15 cm initiated. He is seen today for follow up.     ASSESSMENT/PLAN:   Diagnosis Orders   1. SUZI (obstructive sleep apnea)  DME Order for (Specify) as OP      2. Primary hypertension        3. BMI 33.0-33.9,adult          AHI = 30 (2016).  On Respironics CPAP :  6-15 cmH2O. Set up 2016.    He is adherent with PAP therapy and PAP continues to benefit patient and remains necessary for control of his sleep apnea.     No follow-up provider specified.    Sleep Apnea - Continue on current pressures. He wishes to upgrade his unit. Order placed.     Orders Placed This Encounter   Procedures    DME Order for (Specify) as OP     Primary Encounter Diagnosis: Obstructive Sleep Apnea  (G47.33)    ResMed Device with Heated Humidifer  / .     Positive Airway Pressure Therapy: Duration of need: 99 months.     Set Pressure: 6-15 cmH2O    Diagnosis: (G47.33) SUZI (obstructive sleep apnea)  (primary encounter diagnosis)     Replacement Supplies for Positive Airway Pressure Therapy Device:   Duration of need: 99 months.      Nasal Pillows Combo Mask (Replace) 2 per month.   Nasal Pillows (Replace) 2 per month.     Size medium . P30i        Headgear 1 every 6 months.   Positive Airway Pressure chinstrap 1 every 6 months.     Tubing with

## 2024-05-29 NOTE — PATIENT INSTRUCTIONS
[address]  [place] [address]  Tel.  [phone]  Fax. [phone] [address]  [place] [address]  Tel.  [phone]  Fax. [phone] [address] [address]  Tel.  [phone]  Fax. [phone]     Learning About CPAP for Sleep Apnea  What is CPAP?              CPAP is a small machine that you use at home every night while you sleep. It increases air pressure in your throat to keep your airway open. When you have sleep apnea, this can help you sleep better so you feel much better. CPAP stands for \"continuous positive airway pressure.\"  The CPAP machine will have one of the following:  A mask that covers your nose and mouth  Prongs that fit into your nose  A mask that covers your nose only, the most common type. This type is called NCPAP. The N stands for \"nasal.\"  Why is it done?  CPAP is usually the best treatment for obstructive sleep apnea. It is the first treatment choice and the most widely used. Your doctor may suggest CPAP if you have:  Moderate to severe sleep apnea.  Sleep apnea and coronary artery disease (CAD) or heart failure.  How does it help?  CPAP can help you have more normal sleep, so you feel less sleepy and more alert during the daytime.  CPAP may help keep heart failure or other heart problems from getting worse.  NCPAP may help lower your blood pressure.  If you use CPAP, your bed partner may also sleep better because you are not snoring or restless.  What are the side effects?  Some people who use CPAP have:  A dry or stuffy nose and a sore throat.  Irritated skin on the face.  Sore eyes.  Bloating.  If you have any of these problems, work with your doctor to fix them. Here are some things you can try:  Be sure the mask or nasal prongs fit well.  See if your doctor can adjust the pressure of your CPAP.  If your nose is dry, try a humidifier.  If your nose is runny or stuffy, try decongestant medicine or a steroid

## 2024-05-31 ENCOUNTER — TELEPHONE (OUTPATIENT)
Age: 79
End: 2024-05-31

## 2024-05-31 NOTE — TELEPHONE ENCOUNTER
Received secure message from Meenu Katz,  for [employer], asking for an updated phone number for patient as one they have on file rings busy ([phone]). Provided what we have on file.

## 2024-06-26 SDOH — ECONOMIC STABILITY: HOUSING INSECURITY
IN THE LAST 12 MONTHS, WAS THERE A TIME WHEN YOU DID NOT HAVE A STEADY PLACE TO SLEEP OR SLEPT IN A SHELTER (INCLUDING NOW)?: NO

## 2024-06-26 SDOH — ECONOMIC STABILITY: FOOD INSECURITY: WITHIN THE PAST 12 MONTHS, YOU WORRIED THAT YOUR FOOD WOULD RUN OUT BEFORE YOU GOT MONEY TO BUY MORE.: NEVER TRUE

## 2024-06-26 SDOH — ECONOMIC STABILITY: TRANSPORTATION INSECURITY
IN THE PAST 12 MONTHS, HAS LACK OF TRANSPORTATION KEPT YOU FROM MEETINGS, WORK, OR FROM GETTING THINGS NEEDED FOR DAILY LIVING?: NO

## 2024-06-26 SDOH — HEALTH STABILITY: PHYSICAL HEALTH: ON AVERAGE, HOW MANY DAYS PER WEEK DO YOU ENGAGE IN MODERATE TO STRENUOUS EXERCISE (LIKE A BRISK WALK)?: 1 DAY

## 2024-06-26 SDOH — ECONOMIC STABILITY: FOOD INSECURITY: WITHIN THE PAST 12 MONTHS, THE FOOD YOU BOUGHT JUST DIDN'T LAST AND YOU DIDN'T HAVE MONEY TO GET MORE.: NEVER TRUE

## 2024-06-26 SDOH — ECONOMIC STABILITY: INCOME INSECURITY: HOW HARD IS IT FOR YOU TO PAY FOR THE VERY BASICS LIKE FOOD, HOUSING, MEDICAL CARE, AND HEATING?: NOT HARD AT ALL

## 2024-06-26 SDOH — HEALTH STABILITY: PHYSICAL HEALTH: ON AVERAGE, HOW MANY MINUTES DO YOU ENGAGE IN EXERCISE AT THIS LEVEL?: 20 MIN

## 2024-06-26 ASSESSMENT — PATIENT HEALTH QUESTIONNAIRE - PHQ9
SUM OF ALL RESPONSES TO PHQ9 QUESTIONS 1 & 2: 0
1. LITTLE INTEREST OR PLEASURE IN DOING THINGS: NOT AT ALL
2. FEELING DOWN, DEPRESSED OR HOPELESS: NOT AT ALL
SUM OF ALL RESPONSES TO PHQ QUESTIONS 1-9: 0

## 2024-06-26 ASSESSMENT — LIFESTYLE VARIABLES
HOW OFTEN DO YOU HAVE A DRINK CONTAINING ALCOHOL: NEVER
HOW OFTEN DO YOU HAVE SIX OR MORE DRINKS ON ONE OCCASION: 1
HOW OFTEN DO YOU HAVE A DRINK CONTAINING ALCOHOL: 1
HOW MANY STANDARD DRINKS CONTAINING ALCOHOL DO YOU HAVE ON A TYPICAL DAY: PATIENT DOES NOT DRINK
HOW MANY STANDARD DRINKS CONTAINING ALCOHOL DO YOU HAVE ON A TYPICAL DAY: 0

## 2024-06-27 ENCOUNTER — OFFICE VISIT (OUTPATIENT)
Age: 79
End: 2024-06-27
Payer: MEDICARE

## 2024-06-27 VITALS
DIASTOLIC BLOOD PRESSURE: 82 MMHG | BODY MASS INDEX: 33.56 KG/M2 | WEIGHT: 213.8 LBS | TEMPERATURE: 97.5 F | HEART RATE: 62 BPM | OXYGEN SATURATION: 95 % | HEIGHT: 67 IN | SYSTOLIC BLOOD PRESSURE: 134 MMHG | RESPIRATION RATE: 19 BRPM

## 2024-06-27 DIAGNOSIS — N40.1 BENIGN PROSTATIC HYPERPLASIA WITH URINARY OBSTRUCTION: ICD-10-CM

## 2024-06-27 DIAGNOSIS — R60.0 EDEMA OF BOTH LEGS: ICD-10-CM

## 2024-06-27 DIAGNOSIS — R06.09 DOE (DYSPNEA ON EXERTION): ICD-10-CM

## 2024-06-27 DIAGNOSIS — R19.5 OTHER FECAL ABNORMALITIES: ICD-10-CM

## 2024-06-27 DIAGNOSIS — N13.8 BENIGN PROSTATIC HYPERPLASIA WITH URINARY OBSTRUCTION: ICD-10-CM

## 2024-06-27 DIAGNOSIS — I10 PRIMARY HYPERTENSION: Primary | ICD-10-CM

## 2024-06-27 DIAGNOSIS — Z00.00 MEDICARE ANNUAL WELLNESS VISIT, SUBSEQUENT: Primary | ICD-10-CM

## 2024-06-27 DIAGNOSIS — Z12.5 SCREENING FOR PROSTATE CANCER: ICD-10-CM

## 2024-06-27 DIAGNOSIS — I10 PRIMARY HYPERTENSION: ICD-10-CM

## 2024-06-27 DIAGNOSIS — R09.89 CHRONIC SINUS COMPLAINTS: ICD-10-CM

## 2024-06-27 DIAGNOSIS — H53.10 SUBJECTIVE VISUAL DISTURBANCE OF BOTH EYES: ICD-10-CM

## 2024-06-27 DIAGNOSIS — R91.8 PULMONARY NODULES: ICD-10-CM

## 2024-06-27 LAB
ALBUMIN SERPL-MCNC: 3.4 G/DL (ref 3.5–5)
ALBUMIN/GLOB SERPL: 0.9 (ref 1.1–2.2)
ALP SERPL-CCNC: 176 U/L (ref 45–117)
ALT SERPL-CCNC: 38 U/L (ref 12–78)
ANION GAP SERPL CALC-SCNC: 2 MMOL/L (ref 5–15)
AST SERPL-CCNC: 36 U/L (ref 15–37)
BILIRUB SERPL-MCNC: 1.3 MG/DL (ref 0.2–1)
BUN SERPL-MCNC: 22 MG/DL (ref 6–20)
BUN/CREAT SERPL: 20 (ref 12–20)
CALCIUM SERPL-MCNC: 9 MG/DL (ref 8.5–10.1)
CHLORIDE SERPL-SCNC: 107 MMOL/L (ref 97–108)
CHOLEST SERPL-MCNC: 135 MG/DL
CO2 SERPL-SCNC: 28 MMOL/L (ref 21–32)
CREAT SERPL-MCNC: 1.08 MG/DL (ref 0.7–1.3)
GLOBULIN SER CALC-MCNC: 3.7 G/DL (ref 2–4)
GLUCOSE SERPL-MCNC: 89 MG/DL (ref 65–100)
HDLC SERPL-MCNC: 47 MG/DL
HDLC SERPL: 2.9 (ref 0–5)
LDLC SERPL CALC-MCNC: 77.8 MG/DL (ref 0–100)
POTASSIUM SERPL-SCNC: 4 MMOL/L (ref 3.5–5.1)
PROT SERPL-MCNC: 7.1 G/DL (ref 6.4–8.2)
PSA SERPL-MCNC: 5.8 NG/ML (ref 0.01–4)
SODIUM SERPL-SCNC: 137 MMOL/L (ref 136–145)
TRIGL SERPL-MCNC: 51 MG/DL
VLDLC SERPL CALC-MCNC: 10.2 MG/DL

## 2024-06-27 PROCEDURE — 1123F ACP DISCUSS/DSCN MKR DOCD: CPT | Performed by: INTERNAL MEDICINE

## 2024-06-27 PROCEDURE — G0439 PPPS, SUBSEQ VISIT: HCPCS | Performed by: INTERNAL MEDICINE

## 2024-06-27 PROCEDURE — 99214 OFFICE O/P EST MOD 30 MIN: CPT | Performed by: INTERNAL MEDICINE

## 2024-06-27 PROCEDURE — 3075F SYST BP GE 130 - 139MM HG: CPT | Performed by: INTERNAL MEDICINE

## 2024-06-27 PROCEDURE — 1036F TOBACCO NON-USER: CPT | Performed by: INTERNAL MEDICINE

## 2024-06-27 PROCEDURE — G8417 CALC BMI ABV UP PARAM F/U: HCPCS | Performed by: INTERNAL MEDICINE

## 2024-06-27 PROCEDURE — 3079F DIAST BP 80-89 MM HG: CPT | Performed by: INTERNAL MEDICINE

## 2024-06-27 PROCEDURE — G8427 DOCREV CUR MEDS BY ELIG CLIN: HCPCS | Performed by: INTERNAL MEDICINE

## 2024-06-27 RX ORDER — AZELASTINE 1 MG/ML
2 SPRAY, METERED NASAL 2 TIMES DAILY
Qty: 120 ML | Refills: 1 | Status: SHIPPED | OUTPATIENT
Start: 2024-06-27

## 2024-06-27 RX ORDER — HYDROCHLOROTHIAZIDE 12.5 MG/1
12.5 CAPSULE, GELATIN COATED ORAL EVERY MORNING
Qty: 90 CAPSULE | Refills: 0 | Status: SHIPPED | OUTPATIENT
Start: 2024-06-27

## 2024-06-27 NOTE — PROGRESS NOTES
Medicare Annual Wellness Visit    Marcio Douglas is here for Medicare AWV (Please discuss weight gain per wife. ) and Lab Collection (Fasting. )    Assessment & Plan   Medicare annual wellness visit, subsequent  Screening for prostate cancer  -     PSA Screening; Future  Subjective visual disturbance of both eyes  Transient diplopia.  Check carotids.  Consider CTA head and neck  Follow-up with cardiology w echo and consider monitor to eval for afib  -     Vascular duplex carotid bilateral; Future  CROWE (dyspnea on exertion)  Ongoing.  Recommend repeat CT.  Hx of several pulm nodules  -     CT CHEST W CONTRAST; Future  Benign prostatic hyperplasia with urinary obstruction  Stable s/s   Primary hypertension  This condition is controlled on current medication regimen as written in medication list.  Medications refilled  -     Comprehensive Metabolic Panel; Future  -     Lipid Panel; Future  Other fecal abnormalities  Recommend gluten-free diet.  Colonoscopy pending.  Colitis?   Edema of both legs  Mild  Chronic sinus complaints  Recommend astelin  Pulmonary nodules  -     CT CHEST W CONTRAST; Future    Recommendations for Preventive Services Due: see orders and patient instructions/AVS.  Recommended screening schedule for the next 5-10 years is provided to the patient in written form: see Patient Instructions/AVS.     No follow-ups on file.     Subjective   The following acute and/or chronic problems were also addressed today:      Reports 15-30 seconds of double vision 1 week ago while driving.    Saw optho Dr. Leach 2 days ago.  Was told eyes look normal and it could be a TIA.  Get carotid duplex.    No recurrence.      Seeing dermatology tomorrow for rash of right lateral leg for 1 week.  Itchy.     Nausea in AM.   Sinus congestion in AM, post-nasal drip.   No vomiting.   Sometimes some abdominal cramps, fullness.  Fecal urgency.  Colonoscopy pending 9/2024 w endoscopy for Bang's Dr Kerr  Taking colestipol bid for

## 2024-06-27 NOTE — PATIENT INSTRUCTIONS
This shows the words at the bottom of the screen. Most new TVs can do this.  TTY (text telephone). This lets you type messages back and forth on the telephone instead of talking or listening. These devices are also called TDD. When messages are typed on the keyboard, they are sent over the phone line to a receiving TTY. The message is shown on a monitor.  Use text messaging, social media, and email if it is hard for you to communicate by telephone.  Try to learn a listening technique called speechreading. It is not lipreading. You pay attention to people's gestures, expressions, posture, and tone of voice. These clues can help you understand what a person is saying. Face the person you are talking to, and have them face you. Make sure the lighting is good. You need to see the other person's face clearly.  Think about counseling if you need help to adjust to your hearing loss.  When should you call for help?  Watch closely for changes in your health, and be sure to contact your doctor if:    You think your hearing is getting worse.     You have new symptoms, such as dizziness or nausea.   Where can you learn more?  Go to https://www.[employer].net/patientEd and enter R798 to learn more about \"Hearing Loss: Care Instructions.\"  Current as of: September 27, 2023  Content Version: 14.1  © 8020-5882 [employer].   Care instructions adapted under license by [employer]. If you have questions about a medical condition or this instruction, always ask your healthcare professional. [employer] disclaims any warranty or liability for your use of this information.           Eating Healthy Foods: Care Instructions  With every meal, you can make healthy food choices. Try to eat a variety of fruits, vegetables, whole grains, lean proteins, and low-fat dairy products. This can help you get the right balance of nutrients, including vitamins and minerals. Small changes add up over time. You can start by

## 2024-06-27 NOTE — PROGRESS NOTES
\"Have you been to the ER, urgent care clinic since your last visit?  Hospitalized since your last visit?\"    NO    “Have you seen or consulted any other health care providers outside of [facility] since your last visit?”    NO          Click Here for Release of Records Request

## 2024-07-10 ENCOUNTER — HOSPITAL ENCOUNTER (OUTPATIENT)
Facility: HOSPITAL | Age: 79
Discharge: HOME | End: 2024-07-13
Attending: INTERNAL MEDICINE
Payer: MEDICARE

## 2024-07-10 ENCOUNTER — HOSPITAL ENCOUNTER (OUTPATIENT)
Dept: VASCULAR SURGERY | Facility: HOSPITAL | Age: 79
Discharge: HOME | End: 2024-07-12
Attending: INTERNAL MEDICINE
Payer: MEDICARE

## 2024-07-10 DIAGNOSIS — R91.8 PULMONARY NODULES: ICD-10-CM

## 2024-07-10 DIAGNOSIS — R06.09 DOE (DYSPNEA ON EXERTION): ICD-10-CM

## 2024-07-10 DIAGNOSIS — H53.10 SUBJECTIVE VISUAL DISTURBANCE OF BOTH EYES: ICD-10-CM

## 2024-07-10 PROCEDURE — 93880 EXTRACRANIAL BILAT STUDY: CPT

## 2024-07-10 PROCEDURE — 6360000004 HC RX CONTRAST MEDICATION: Performed by: INTERNAL MEDICINE

## 2024-07-10 PROCEDURE — 71260 CT THORAX DX C+: CPT

## 2024-07-10 RX ADMIN — IOPAMIDOL 80 ML: 755 INJECTION, SOLUTION INTRAVENOUS at 12:47

## 2024-07-11 LAB
VAS LEFT CCA DIST EDV: 19.2 CM/S
VAS LEFT CCA DIST PSV: 74.4 CM/S
VAS LEFT CCA PROX EDV: 25.5 CM/S
VAS LEFT CCA PROX PSV: 102.3 CM/S
VAS LEFT ECA EDV: 23.3 CM/S
VAS LEFT ECA PSV: 124.2 CM/S
VAS LEFT ICA DIST EDV: 47.3 CM/S
VAS LEFT ICA DIST PSV: 132.8 CM/S
VAS LEFT ICA MID EDV: 24.8 CM/S
VAS LEFT ICA MID PSV: 78.7 CM/S
VAS LEFT ICA PROX EDV: 29.2 CM/S
VAS LEFT ICA PROX PSV: 83.1 CM/S
VAS LEFT ICA/CCA PSV: 1.8 NO UNITS
VAS LEFT SUBCLAVIAN PROX EDV: 0 CM/S
VAS LEFT SUBCLAVIAN PROX PSV: 155 CM/S
VAS LEFT VERTEBRAL EDV: 21 CM/S
VAS LEFT VERTEBRAL PSV: 84.6 CM/S
VAS RIGHT CCA DIST EDV: 20.4 CM/S
VAS RIGHT CCA DIST PSV: 70.7 CM/S
VAS RIGHT CCA PROX EDV: 24.8 CM/S
VAS RIGHT CCA PROX PSV: 85.1 CM/S
VAS RIGHT ECA EDV: 9.3 CM/S
VAS RIGHT ECA PSV: 84.2 CM/S
VAS RIGHT ICA DIST EDV: 35.8 CM/S
VAS RIGHT ICA DIST PSV: 85.1 CM/S
VAS RIGHT ICA MID EDV: 33.9 CM/S
VAS RIGHT ICA MID PSV: 111.2 CM/S
VAS RIGHT ICA PROX EDV: 32.7 CM/S
VAS RIGHT ICA PROX PSV: 91.6 CM/S
VAS RIGHT ICA/CCA PSV: 1.6 NO UNITS
VAS RIGHT SUBCLAVIAN PROX EDV: 0 CM/S
VAS RIGHT SUBCLAVIAN PROX PSV: 139.9 CM/S
VAS RIGHT VERTEBRAL EDV: 21.2 CM/S
VAS RIGHT VERTEBRAL PSV: 74.2 CM/S

## 2024-07-25 DIAGNOSIS — R19.5 LOOSE STOOLS: ICD-10-CM

## 2024-07-25 DIAGNOSIS — Z90.49 HISTORY OF CHOLECYSTECTOMY: ICD-10-CM

## 2024-07-25 DIAGNOSIS — R10.9 ABDOMINAL CRAMPING: ICD-10-CM

## 2024-07-25 RX ORDER — MONTELUKAST SODIUM 4 MG/1
TABLET, CHEWABLE ORAL
Qty: 60 TABLET | Refills: 5 | Status: SHIPPED | OUTPATIENT
Start: 2024-07-25

## 2024-07-25 NOTE — TELEPHONE ENCOUNTER
PCP: Celestino Waters MD    Last appt: 6/27/2024   Future Appointments   Date Time Provider Department Center   6/30/2025 10:00 AM Celestino Waters MD [facility] AMB       Requested Prescriptions     Pending Prescriptions Disp Refills    colestipol (COLESTID) 1 g tablet [Pharmacy Med Name: COLESTIPOL 1GM TABLETS] 60 tablet 3     Sig: TAKE 1 TABLET BY MOUTH TWICE DAILY FOR BILE ACID DIARRHEA     Received refill request from [employer] to [facility] on [address] [facility] for colestipol 1g tab, 1 tab BID, 60 tabs with 3 refills. Patient has been compliant with medication since 4/3/2024. Rx in pending for provider review and approval.     Reina \"Tj\" GREGORY Joyner

## 2024-09-23 DIAGNOSIS — I10 PRIMARY HYPERTENSION: ICD-10-CM

## 2024-09-23 RX ORDER — HYDROCHLOROTHIAZIDE 12.5 MG/1
12.5 CAPSULE ORAL EVERY MORNING
Qty: 90 CAPSULE | Refills: 2 | Status: SHIPPED | OUTPATIENT
Start: 2024-09-23

## 2024-10-10 ENCOUNTER — ANESTHESIA EVENT (OUTPATIENT)
Facility: HOSPITAL | Age: 79
End: 2024-10-10
Payer: MEDICARE

## 2024-10-10 ENCOUNTER — ANESTHESIA (OUTPATIENT)
Facility: HOSPITAL | Age: 79
End: 2024-10-10
Payer: MEDICARE

## 2024-10-10 ENCOUNTER — HOSPITAL ENCOUNTER (OUTPATIENT)
Facility: HOSPITAL | Age: 79
Setting detail: OUTPATIENT SURGERY
Discharge: HOME | End: 2024-10-10
Attending: INTERNAL MEDICINE | Admitting: INTERNAL MEDICINE
Payer: MEDICARE

## 2024-10-10 VITALS
BODY MASS INDEX: 32.21 KG/M2 | DIASTOLIC BLOOD PRESSURE: 76 MMHG | TEMPERATURE: 97.7 F | SYSTOLIC BLOOD PRESSURE: 119 MMHG | RESPIRATION RATE: 20 BRPM | OXYGEN SATURATION: 97 % | WEIGHT: 205.25 LBS | HEIGHT: 67 IN | HEART RATE: 73 BPM

## 2024-10-10 PROCEDURE — 2709999900 HC NON-CHARGEABLE SUPPLY: Performed by: INTERNAL MEDICINE

## 2024-10-10 PROCEDURE — 3600007512: Performed by: INTERNAL MEDICINE

## 2024-10-10 PROCEDURE — 7100000010 HC PHASE II RECOVERY - FIRST 15 MIN: Performed by: INTERNAL MEDICINE

## 2024-10-10 PROCEDURE — 3700000001 HC ADD 15 MINUTES (ANESTHESIA): Performed by: INTERNAL MEDICINE

## 2024-10-10 PROCEDURE — 6360000002 HC RX W HCPCS: Performed by: NURSE ANESTHETIST, CERTIFIED REGISTERED

## 2024-10-10 PROCEDURE — 3700000000 HC ANESTHESIA ATTENDED CARE: Performed by: INTERNAL MEDICINE

## 2024-10-10 PROCEDURE — 7100000011 HC PHASE II RECOVERY - ADDTL 15 MIN: Performed by: INTERNAL MEDICINE

## 2024-10-10 PROCEDURE — 3600007502: Performed by: INTERNAL MEDICINE

## 2024-10-10 PROCEDURE — 88305 TISSUE EXAM BY PATHOLOGIST: CPT

## 2024-10-10 RX ORDER — SODIUM CHLORIDE 9 MG/ML
INJECTION, SOLUTION INTRAVENOUS CONTINUOUS
Status: DISCONTINUED | OUTPATIENT
Start: 2024-10-10 | End: 2024-10-10 | Stop reason: HOSPADM

## 2024-10-10 RX ORDER — LIDOCAINE HCL/PF 100 MG/5ML
SYRINGE (ML) INJECTION
Status: DISCONTINUED | OUTPATIENT
Start: 2024-10-10 | End: 2024-10-10 | Stop reason: SDUPTHER

## 2024-10-10 RX ORDER — PROPOFOL 10 MG/ML
INJECTION, EMULSION INTRAVENOUS
Status: DISCONTINUED | OUTPATIENT
Start: 2024-10-10 | End: 2024-10-10 | Stop reason: SDUPTHER

## 2024-10-10 RX ADMIN — PROPOFOL 50 MG: 10 INJECTION, EMULSION INTRAVENOUS at 11:25

## 2024-10-10 RX ADMIN — PROPOFOL 50 MG: 10 INJECTION, EMULSION INTRAVENOUS at 11:20

## 2024-10-10 RX ADMIN — PROPOFOL 150 MCG/KG/MIN: 10 INJECTION, EMULSION INTRAVENOUS at 11:31

## 2024-10-10 RX ADMIN — PROPOFOL 50 MG: 10 INJECTION, EMULSION INTRAVENOUS at 11:29

## 2024-10-10 RX ADMIN — LIDOCAINE HYDROCHLORIDE 50 MG: 20 INJECTION INTRAVENOUS at 11:20

## 2024-10-10 ASSESSMENT — PAIN SCALES - GENERAL
PAINLEVEL_OUTOF10: 0

## 2024-10-10 ASSESSMENT — PAIN - FUNCTIONAL ASSESSMENT: PAIN_FUNCTIONAL_ASSESSMENT: 0-10

## 2024-10-10 NOTE — H&P
The patient is a 79 year old male who presents with a complaint of Heartburn. Note for \"Heartburn\": Patient presents to office for follow up heartburn    EGD 9/19/2023 Bang's esophagus involving less esophageal mucosa then previously seen. hiatal hernia  bx stable Bang's esophagus, continue ppi and repeat egd 2 years    last colonoscopy 10/2014 diminutive polyp resected and retrieved. mild sigmoid diverticulosis and small internal hemorrhoids  repeat colonoscopy in  years. hyperplastic recall 10 years    Overall patient is doing very well at this time.  Reports rare breakthrough symptoms with b.i.d. PPI.  He would like to try decreasing this to once per day as he finds it cumbersome to take this twice a day.  No dysphagia, melena, unexplained weight loss.    He denies any lower GI symptoms at this time.  No abdominal pain, change in bowel habits, hematochezia.      Problem List/Past Medical (Milagros Baez; 4/30/2024 8:45 AM)  Hypertension (I10)    Sleep apnea (G47.30)    Tinnitus    Arthritis    Adult celiac disease (K90.0)    Dysphagia (R13.10)    Bang's esophagus (K22.70)      Past Surgical History (Milagros Baez; 4/30/2024 8:47 AM)  Cholecystectomy      Allergies (Milagros Baez; 4/30/2024 8:46 AM)  No Known Allergies   [04/24/2017]:  No Known Drug Allergies   [04/24/2017]:    Medication History (Milagros Baez; 4/30/2024 8:52 AM)  omeprazole  (40mg capsule,delayed, 1 (one) oral BID, Taken starting 04/23/2024) Active.  Saw Palmetto  (1000MG capsule, 1 Oral biD) Active.  hydroCHLOROthiazide  (50mg tablet, oral) Active.  tamsulosin  (0.4mg capsule, oral) Active.  colestipoL  (oral) Specific strength unknown - Active.  irbesartan-hydrochlorothiazide  (oral) Specific strength unknown - Active.  Medications Reconciled     Family History (Milagros Baez; 4/30/2024 8:48 AM)  Celiac Disease   Daughter, Mother.  Leukemia   Mother.  Hypertension   Father, Sister.  Diabetes Mellitus

## 2024-10-10 NOTE — OP NOTE
[facility]  Cong Kerr M.D.  [phone]            10/10/2024          Colonoscopy Operative Report  Marcio Douglas  :  1945  [facility] Medical Record Number:  [id number]      Indications:    Screening colonoscopy     :  Cong Kerr MD    Referring Provider: Celestino Waters MD    Sedation:  MAC anesthesia    Pre-Procedural Exam:      Airway: clear,  No airway problems anticipated  Heart: RRR, without gallops or rubs  Lungs: clear bilaterally without wheezes, crackles, or rhonchi  Abdomen: soft, nontender, nondistended, bowel sounds present  Mental Status: awake, alert and oriented to person, place and time     Procedure Details:  After informed consent was obtained with all risks and benefits of procedure explained and preoperative exam completed, the patient was taken to the endoscopy suite and placed in the left lateral decubitus position.  Upon sequential sedation as per above, a digital rectal exam was performed. The Olympus videocolonoscope  was inserted in the rectum and carefully advanced to the cecum, which was identified by the ileocecal valve and appendiceal orifice.  The quality of preparation was good.  The colonoscope was slowly withdrawn with careful inspection and evaluation between folds. Retroflexion in the rectum was performed.    Findings:   Terminal Ileum: not intubated  Cecum: normal  Ascending Colon: normal  Transverse Colon: 1  Sessile polyp(s), the largest 2 mm in size;  Descending Colon: normal  Sigmoid: normal  Rectum: no mucosal lesion appreciated  Grade 1 internal hemorrhoid(s);    Interventions:  1 complete polypectomy were performed using cold biopsy forceps and the polyps were  retrieved    Specimen Removed:  specimen #1, 2 mm in size, located in the transverse colon removed by cold biopsy and sent for pathology    Complications: None.     EBL:  None.    Impression:  One diminutive polyp removed and sent to pathology, otherwise mucosa within normal.        Recommendations:  -If adenoma is present, repeat colonoscopy in 5 years.   -High fiber diet.    -Resume current medication(s)    Discharge Disposition:  Home in the company of a  when able to ambulate.    Cong Kerr MD  10/10/2024  11:43 AM

## 2024-10-10 NOTE — PROGRESS NOTES
Received recovery report from anesthesia team, see anesthesia note. Abdomen remains soft and non-tender post-procedure. Pt has no complaints at this time and tolerated procedure well. Endoscope was pre-cleaned at the bedside by Ree Robbins immediately following procedure. Post recovery report given to Divya Scott.

## 2024-10-10 NOTE — ANESTHESIA PRE PROCEDURE
Department of Anesthesiology  Preprocedure Note       Name:  Marcio Douglas   Age:  79 y.o.  :  1945                                          MRN:  [id number]         Date:  10/10/2024      Surgeon: Surgeon(s):  Cong Kerr MD    Procedure: Procedure(s):  ESOPHAGOGASTRODUODENOSCOPY  COLONOSCOPY DIAGNOSTIC    Medications prior to admission:   Prior to Admission medications    Medication Sig Start Date End Date Taking? Authorizing Provider   irbesartan (AVAPRO) 300 MG tablet TAKE 1 TABLET BY MOUTH EVERY NIGHT 3/24/24  Yes Celestino Waters MD   tamsulosin (FLOMAX) 0.4 MG capsule Take 1 capsule by mouth daily 23  Yes Celestino Waters MD   Saw Palmetto, Serenoa repens, (SAW PALMETTO PO) Take 1 tablet by mouth 2 times daily   Yes Automatic Reconciliation, Ar   omeprazole (PRILOSEC) 40 MG delayed release capsule TK 1 C PO BID 20  Yes Automatic Reconciliation, Ar   hydroCHLOROthiazide 12.5 MG capsule TAKE 1 CAPSULE BY MOUTH EVERY MORNING. REPLACES AMLODIPINE 4/3/24  Patient not taking: Reported on 10/10/2024 9/23/24   Celestino Waters MD   colestipol (COLESTID) 1 g tablet TAKE 1 TABLET BY MOUTH TWICE DAILY FOR BILE ACID DIARRHEA  Patient not taking: Reported on 10/10/2024 7/25/24   Celestino Waters MD   azelastine (ASTELIN) 0.1 % nasal spray 2 sprays by Nasal route 2 times daily Use in each nostril as directed 24   Celestino Waters MD   acetaminophen (TYLENOL) 500 MG tablet Take by mouth every 6 hours as needed 9/3/20   Automatic Reconciliation, Ar   Cholecalciferol 50 MCG ( UT) CAPS Take by mouth daily 21   Automatic Reconciliation, Ar       Current medications:    No current facility-administered medications for this encounter.       Allergies:  No Known Allergies    Problem List:    Patient Active Problem List   Diagnosis Code   • Abnormal celiac antibody panel R89.4   • Elevated prostate specific antigen (PSA) R97.20   • Venous stasis dermatitis of right lower extremity I87.2   • Hearing loss H91.90   •

## 2024-10-10 NOTE — DISCHARGE INSTRUCTIONS
[facility]  Cong Kerr M.D.  [phone]                 COLON and EGD DISCHARGE INSTRUCTIONS    10/10/2024    Marcio Douglas  :  1945  [facility] Medical Record Number:  [id number]      DISCOMFORT:  Sore throat- throat lozenges or warm salt water gargle  Redness at IV site- apply warm compress to area; if redness or soreness persist- contact your physician  There may be a slight amount of blood passed from the rectum  Gaseous discomfort- walking, belching will help relieve any discomfort  You may not operate a vehicle for 12 hours  You may not engage in an occupation involving machinery or appliances for rest of today  You may not drink alcoholic beverages for at least 12 hours  Avoid making any critical decisions for at least 24 hour  DIET:   High fiber diet   - however -  remember your colon is empty and a heavy meal will produce gas.   Avoid these foods:  vegetables, fried / greasy foods, carbonated drinks for today     ACTIVITY:  You may  resume your normal daily activities it is recommended that you spend the remainder of the day resting -  avoid any strenuous activity and driving.    CALL M.DArt  ANY SIGN OF:   Increasing pain, nausea, vomiting  Abdominal distension (swelling)  New increased bleeding (oral or rectal)  Fever (chills)  Pain in chest area  Bloody discharge from nose or mouth  Shortness of breath      Follow-up Instructions:   Call Dr. Kerr if any questions at [phone].  Results of procedure / biopsy in 7 to 10 days, we will call you with these results.  Your endoscopy showed a small hiatal hernia and improved escobedo's segment. Continue with anti-reflux measures and current medications.   Your colonoscopy showed one diminutive polyp that was removed and sent to pathology, otherwise within normal.

## 2024-10-10 NOTE — ANESTHESIA POSTPROCEDURE EVALUATION
Department of Anesthesiology  Postprocedure Note    Patient: Marcio Douglas  MRN: [id number]  YOB: 1945  Date of evaluation: 10/10/2024    Procedure Summary       Date: 10/10/24 Room / Location: [facility] ENDO 03 / [facility] ENDOSCOPY    Anesthesia Start: 1113 Anesthesia Stop: 1142    Procedures:       ESOPHAGOGASTRODUODENOSCOPY (Upper GI Region)      COLONOSCOPY DIAGNOSTIC (Lower GI Region)      ESOPHAGOGASTRODUODENOSCOPY BIOPSY (Upper GI Region)      COLONOSCOPY POLYPECTOMY SNARE/BIOPSY (Lower GI Region) Diagnosis:       Bang's esophagus with dysplasia      Special screening for malignant neoplasms, colon      (Bang's esophagus with dysplasia [K22.719])      (Special screening for malignant neoplasms, colon [Z12.11])    Surgeons: Cong Kerr MD Responsible Provider: Fay Whaley MD    Anesthesia Type: MAC ASA Status: 2            Anesthesia Type: No value filed.    Jimena Phase I:      Jimena Phase II: Jimena Score: 10    Anesthesia Post Evaluation    Patient location during evaluation: PACU  Patient participation: complete - patient participated  Level of consciousness: awake  Airway patency: patent  Nausea & Vomiting: no vomiting and no nausea  Cardiovascular status: hemodynamically stable  Respiratory status: acceptable  Hydration status: stable  Pain management: adequate    No notable events documented.

## 2024-10-10 NOTE — PROGRESS NOTES
Marcio Douglas  1945  [id number]    Situation:  Verbal report received from:   TAMI Hussein   Procedure: Procedure(s):  ESOPHAGOGASTRODUODENOSCOPY  COLONOSCOPY DIAGNOSTIC  ESOPHAGOGASTRODUODENOSCOPY BIOPSY  COLONOSCOPY POLYPECTOMY SNARE/BIOPSY    Background:    Preoperative diagnosis: Bang's esophagus with dysplasia [K22.719]  Special screening for malignant neoplasms, colon [Z12.11]  Postoperative diagnosis: * No post-op diagnosis entered *    :  Dr. Simms  Assistant(s): Circulator: Jovita Herring RN  Endoscopy Technician: Ree Robbins    Specimens:   ID Type Source Tests Collected by Time Destination   1 : esophageal biopsy 35cm Tissue Esophagus SURGICAL PATHOLOGY Cong Simms MD 10/10/2024 1125    2 : esophageal biopsy at 34cm Tissue Esophagus SURGICAL PATHOLOGY Cong Simms MD 10/10/2024 1127    3 : transverse colon polyp Tissue Colon-Transverse SURGICAL PATHOLOGY Cong Simms MD 10/10/2024 1135      H. Pylori    no    Assessment:  Intra-procedure medications       Anesthesia gave intra-procedure sedation and medications, see anesthesia flow sheet    yes    Intravenous fluids: NS@ KVO     Vital signs stable    yes    Abdominal assessment: round and soft    yes    Recommendation:  Discharge patient per MD order   yes.  Return to floor   outpatient  Family or Friend    spouse  Permission to share finding with family or friend    yes

## 2024-10-10 NOTE — OP NOTE
[facility]  Cong Kerr M.D.  [phone]           10/10/2024                EGD Operative Report  Marcio Douglas  :  1945  [facility] Medical Record Number:  [id number]      Indication: Bang's/esophageal ulcer    : Cong Kerr MD    Referring Provider:  Celestino Watesr MD      Anesthesia/Sedation:  MAC anesthesia    Airway assessment: No airway problems anticipated    Pre-Procedural Exam:      Airway: clear, no airway problems anticipated  Heart: RRR, without gallops or rubs  Lungs: clear bilaterally without wheezes, crackles, or rhonchi  Abdomen: soft, nontender, nondistended, bowel sounds present  Mental Status: awake, alert and oriented to person, place and time       Procedure Details     After infomed consent was obtained for the procedure, with all risks and benefits of procedure explained the patient was taken to the endoscopy suite and placed in the left lateral decubitus position.  Following sequential administration of sedation as per above, the endoscope was inserted into the mouth and advanced under direct vision to second portion of the duodenum.  A careful inspection was made as the gastroscope was withdrawn, including a retroflexed view of the proximal stomach; findings and interventions are described below.      Findings:   Esophagus:Evidence of salmon colored mucosa noted in the distal esophagus, short segment about 1 cm with few scattered proximal islands, previously biopsied and showed intestinal metaplasia. This has improved compared to last endoscopy.  Stomach: Small hiatal hernia, about 3 cm in length, otherwise mucosa within normal  Duodenum/jejunum: normal    Therapies:  none    Specimens:  Four quadrant biopsies obtained at 35 cm from the gums and 34 cm from the gums.           Complications:   None; patient tolerated the procedure well.    EBL:  None.           Impression:    Improved and shorted segment of Bang's esophagus                           Small hiatal hernia     Recommendations:    -Continue acid suppression with omeprazole once a day  -Continue with anti-reflux measures  -Await pathology.    Cong Kerr MD

## 2024-10-27 DIAGNOSIS — I10 PRIMARY HYPERTENSION: ICD-10-CM

## 2024-10-29 RX ORDER — IRBESARTAN 300 MG/1
300 TABLET ORAL NIGHTLY
Qty: 90 TABLET | Refills: 1 | Status: SHIPPED | OUTPATIENT
Start: 2024-10-29

## 2024-11-12 LAB — PSA, EXTERNAL: 5.03

## 2025-04-28 DIAGNOSIS — I10 PRIMARY HYPERTENSION: ICD-10-CM

## 2025-04-28 RX ORDER — IRBESARTAN 300 MG/1
300 TABLET ORAL NIGHTLY
Qty: 90 TABLET | Refills: 1 | Status: SHIPPED | OUTPATIENT
Start: 2025-04-28

## 2025-06-12 DIAGNOSIS — I10 PRIMARY HYPERTENSION: ICD-10-CM

## 2025-06-12 RX ORDER — HYDROCHLOROTHIAZIDE 12.5 MG/1
12.5 CAPSULE ORAL EVERY MORNING
Qty: 90 CAPSULE | Refills: 2 | Status: SHIPPED | OUTPATIENT
Start: 2025-06-12

## 2025-06-27 SDOH — ECONOMIC STABILITY: FOOD INSECURITY: WITHIN THE PAST 12 MONTHS, THE FOOD YOU BOUGHT JUST DIDN'T LAST AND YOU DIDN'T HAVE MONEY TO GET MORE.: NEVER TRUE

## 2025-06-27 SDOH — ECONOMIC STABILITY: FOOD INSECURITY: WITHIN THE PAST 12 MONTHS, YOU WORRIED THAT YOUR FOOD WOULD RUN OUT BEFORE YOU GOT MONEY TO BUY MORE.: NEVER TRUE

## 2025-06-27 SDOH — HEALTH STABILITY: PHYSICAL HEALTH: ON AVERAGE, HOW MANY DAYS PER WEEK DO YOU ENGAGE IN MODERATE TO STRENUOUS EXERCISE (LIKE A BRISK WALK)?: 0 DAYS

## 2025-06-27 SDOH — ECONOMIC STABILITY: INCOME INSECURITY: IN THE LAST 12 MONTHS, WAS THERE A TIME WHEN YOU WERE NOT ABLE TO PAY THE MORTGAGE OR RENT ON TIME?: NO

## 2025-06-27 SDOH — ECONOMIC STABILITY: TRANSPORTATION INSECURITY
IN THE PAST 12 MONTHS, HAS THE LACK OF TRANSPORTATION KEPT YOU FROM MEDICAL APPOINTMENTS OR FROM GETTING MEDICATIONS?: NO

## 2025-06-27 ASSESSMENT — PATIENT HEALTH QUESTIONNAIRE - PHQ9
SUM OF ALL RESPONSES TO PHQ QUESTIONS 1-9: 1
2. FEELING DOWN, DEPRESSED OR HOPELESS: NOT AT ALL
SUM OF ALL RESPONSES TO PHQ QUESTIONS 1-9: 1
1. LITTLE INTEREST OR PLEASURE IN DOING THINGS: SEVERAL DAYS

## 2025-06-27 ASSESSMENT — LIFESTYLE VARIABLES
HOW OFTEN DO YOU HAVE A DRINK CONTAINING ALCOHOL: NEVER
HOW MANY STANDARD DRINKS CONTAINING ALCOHOL DO YOU HAVE ON A TYPICAL DAY: 0
HOW OFTEN DO YOU HAVE SIX OR MORE DRINKS ON ONE OCCASION: 1
HOW MANY STANDARD DRINKS CONTAINING ALCOHOL DO YOU HAVE ON A TYPICAL DAY: PATIENT DOES NOT DRINK
HOW OFTEN DO YOU HAVE A DRINK CONTAINING ALCOHOL: 1

## 2025-06-30 ENCOUNTER — OFFICE VISIT (OUTPATIENT)
Facility: CLINIC | Age: 80
End: 2025-06-30
Payer: MEDICARE

## 2025-06-30 VITALS
OXYGEN SATURATION: 95 % | HEART RATE: 58 BPM | WEIGHT: 217.6 LBS | TEMPERATURE: 97.5 F | RESPIRATION RATE: 12 BRPM | BODY MASS INDEX: 34.15 KG/M2 | HEIGHT: 67 IN | SYSTOLIC BLOOD PRESSURE: 127 MMHG | DIASTOLIC BLOOD PRESSURE: 73 MMHG

## 2025-06-30 DIAGNOSIS — R63.5 WEIGHT GAIN: ICD-10-CM

## 2025-06-30 DIAGNOSIS — R06.09 DOE (DYSPNEA ON EXERTION): ICD-10-CM

## 2025-06-30 DIAGNOSIS — R97.20 PSA ELEVATION: ICD-10-CM

## 2025-06-30 DIAGNOSIS — R60.0 EDEMA OF BOTH LEGS: ICD-10-CM

## 2025-06-30 DIAGNOSIS — M79.672 PAIN OF LEFT HEEL: ICD-10-CM

## 2025-06-30 DIAGNOSIS — N13.8 BENIGN PROSTATIC HYPERPLASIA WITH URINARY OBSTRUCTION: ICD-10-CM

## 2025-06-30 DIAGNOSIS — I10 PRIMARY HYPERTENSION: ICD-10-CM

## 2025-06-30 DIAGNOSIS — N40.1 BENIGN PROSTATIC HYPERPLASIA WITH URINARY OBSTRUCTION: ICD-10-CM

## 2025-06-30 DIAGNOSIS — Z00.00 MEDICARE ANNUAL WELLNESS VISIT, SUBSEQUENT: Primary | ICD-10-CM

## 2025-06-30 PROCEDURE — 1123F ACP DISCUSS/DSCN MKR DOCD: CPT | Performed by: INTERNAL MEDICINE

## 2025-06-30 PROCEDURE — 3078F DIAST BP <80 MM HG: CPT | Performed by: INTERNAL MEDICINE

## 2025-06-30 PROCEDURE — 99214 OFFICE O/P EST MOD 30 MIN: CPT | Performed by: INTERNAL MEDICINE

## 2025-06-30 PROCEDURE — 1125F AMNT PAIN NOTED PAIN PRSNT: CPT | Performed by: INTERNAL MEDICINE

## 2025-06-30 PROCEDURE — 1160F RVW MEDS BY RX/DR IN RCRD: CPT | Performed by: INTERNAL MEDICINE

## 2025-06-30 PROCEDURE — G0439 PPPS, SUBSEQ VISIT: HCPCS | Performed by: INTERNAL MEDICINE

## 2025-06-30 PROCEDURE — 1159F MED LIST DOCD IN RCRD: CPT | Performed by: INTERNAL MEDICINE

## 2025-06-30 PROCEDURE — 3074F SYST BP LT 130 MM HG: CPT | Performed by: INTERNAL MEDICINE

## 2025-06-30 PROCEDURE — G8427 DOCREV CUR MEDS BY ELIG CLIN: HCPCS | Performed by: INTERNAL MEDICINE

## 2025-06-30 PROCEDURE — 1036F TOBACCO NON-USER: CPT | Performed by: INTERNAL MEDICINE

## 2025-06-30 PROCEDURE — G8417 CALC BMI ABV UP PARAM F/U: HCPCS | Performed by: INTERNAL MEDICINE

## 2025-06-30 RX ORDER — MULTIVITAMIN WITH IRON
1 TABLET ORAL DAILY
COMMUNITY

## 2025-06-30 RX ORDER — IRBESARTAN 300 MG/1
150 TABLET ORAL NIGHTLY
Qty: 90 TABLET | Refills: 1
Start: 2025-06-30

## 2025-06-30 NOTE — PROGRESS NOTES
Identified pt with two pt identifiers(name and ). Reviewed record in preparation for visit and have obtained necessary documentation. All patient medications has been reviewed.  Chief Complaint   Patient presents with    Medicare AWV    Foot Pain     For about 1 week       Health Maintenance Due   Topic    Shingles vaccine (2 of 3)    Respiratory Syncytial Virus (RSV) Pregnant or age 60 yrs+ (1 - 1-dose 75+ series)    COVID-19 Vaccine ( season)    Annual Wellness Visit (Medicare)     Prostate Specific Antigen (PSA) Screening or Monitoring      Health Maintenance Review: Patient reminded of \"due or due soon\" health maintenance. I have asked the patient to contact his/her primary care provider (PCP) for follow-up on his/her health maintenance.    Wt Readings from Last 3 Encounters:   25 98.7 kg (217 lb 9.6 oz)   10/10/24 93.1 kg (205 lb 4 oz)   24 97 kg (213 lb 12.8 oz)     Temp Readings from Last 3 Encounters:   25 97.5 °F (36.4 °C) (Oral)   10/10/24 97.7 °F (36.5 °C) (Oral)   24 97.5 °F (36.4 °C) (Oral)     BP Readings from Last 3 Encounters:   25 127/73   10/10/24 119/76   24 134/82     Pulse Readings from Last 3 Encounters:   25 58   10/10/24 73   24 62       1. \"Have you been to the ER, urgent care clinic since your last visit?  Hospitalized since your last visit?\" No    2. \"Have you seen or consulted any other health care providers outside of the [facility] since your last visit?\" Cardiovascular disease, home health, & Anatomic Pathology and Clinical Pathology     3. For patients aged 45-75: Has the patient had a colonoscopy / FIT/ Cologuard? NA - based on age    Patient is accompanied by self I have received verbal consent from Marcio Douglas to discuss any/all medical information while they are present in the room.

## 2025-06-30 NOTE — PROGRESS NOTES
Medicare Annual Wellness Visit    Marcio Douglas is here for Medicare AWV and Foot Pain (For about 1 week)    Assessment & Plan   Medicare annual wellness visit, subsequent  Consider Shingrix vaccine.       No follow-ups on file.     Subjective   The following acute and/or chronic problems were also addressed today:  See attached    Patient's complete Health Risk Assessment and screening values have been reviewed and are found in Flowsheets. The following problems were reviewed today and where indicated follow up appointments were made and/or referrals ordered.    Positive Risk Factor Screenings with Interventions:             General HRA Questions:  Select all that apply: (!) (Patient-Rptd) New or Increased Fatigue  Interventions Fatigue:  See above      Inactivity:  On average, how many days per week do you engage in moderate to strenuous exercise (like a brisk walk)?: (Patient-Rptd) 0 days (!) Abnormal  On average, how many minutes do you engage in exercise at this level?: 0 min  Interventions:  Patient declined any further interventions or treatment    Poor Eating Habits/Diet:  Do you eat balanced/healthy meals regularly?: (!) (Patient-Rptd) No  Interventions:  low carbohydrate diet    Abnormal BMI (obese):  Body mass index is 34.08 kg/m². (!) Abnormal  Interventions:  Patient declines any further evaluation or treatment         Hearing Screen:  Do you or your family notice any trouble with your hearing that hasn't been managed with hearing aids?: (!) (Patient-Rptd) Yes    Interventions:  Recommend hearing aids regularly       Advanced Directives:  Do you have a Living Will?: (!) (Patient-Rptd) No    Intervention:  has an advanced directive - a copy HAS NOT been provided.                                     Objective   Vitals:    06/30/25 0948   BP: 127/73   Pulse: 58   Resp: 12   Temp: 97.5 °F (36.4 °C)   TempSrc: Oral   SpO2: 95%   Weight: 98.7 kg (217 lb 9.6 oz)   Height: 1.702 m (5' 7\")      Body mass index is

## 2025-07-01 NOTE — PROGRESS NOTES
HISTORY OF PRESENT ILLNESS    Chief Complaint   Patient presents with    Medicare AW    Foot Pain     For about 1 week       History of Present Illness  The patient is a 80-year-old male who presents for his annual wellness visit and follow-up. He is followed by Dr. Rei Pope in cardiology and Vish Abebe in urology.    He reports experiencing pain in his left heel, which he describes as similar to a stone bruise. The pain is particularly severe upon waking up in the morning, making it difficult for him to bear weight on the affected foot. He has not had any recent injuries or falls that could have caused this pain. He has been using cushioned shoes, which seem to alleviate the discomfort.    He also mentions experiencing shortness of breath, particularly when ascending or descending stairs. He manages this by slowing his pace and taking breaks. He does not experience any chest pressure. He has not discussed this symptom with Dr. Ellison. He underwent a stress test a few years ago and a CT scan of the chest last summer.    He has noticed an increase in his weight since his last visit, which he believes may be contributing to his shortness of breath. He attributes his weight gain to increased snacking and eating, and reports having a higher appetite than usual. 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 He estimates a weight gain of approximately 6 pounds over the past year.    He has observed mild swelling in his legs, which he suspects may be due to water retention.    He experiences coughing, primarily due to sinus drainage, which occurs mostly at night and occasionally during the day. He does not cough up anything. He uses Ricola lozenges to manage his cough, which he finds helpful.    He had blood work done within the last 3 or 4 months. He is seeing Dr. Carmona, urologist, who is following his prostate condition. His next appointment is in November

## 2025-07-02 LAB
ALBUMIN SERPL-MCNC: 3.5 G/DL (ref 3.5–5)
ALBUMIN/GLOB SERPL: 1 (ref 1.1–2.2)
ALP SERPL-CCNC: 155 U/L (ref 45–117)
ALT SERPL-CCNC: 30 U/L (ref 12–78)
ANION GAP SERPL CALC-SCNC: 5 MMOL/L (ref 2–12)
AST SERPL-CCNC: 26 U/L (ref 15–37)
BILIRUB SERPL-MCNC: 0.8 MG/DL (ref 0.2–1)
BUN SERPL-MCNC: 15 MG/DL (ref 6–20)
BUN/CREAT SERPL: 13 (ref 12–20)
CALCIUM SERPL-MCNC: 9.1 MG/DL (ref 8.5–10.1)
CHLORIDE SERPL-SCNC: 107 MMOL/L (ref 97–108)
CHOLEST SERPL-MCNC: 125 MG/DL
CO2 SERPL-SCNC: 29 MMOL/L (ref 21–32)
CREAT SERPL-MCNC: 1.14 MG/DL (ref 0.7–1.3)
ERYTHROCYTE [DISTWIDTH] IN BLOOD BY AUTOMATED COUNT: 14 % (ref 11.5–14.5)
GLOBULIN SER CALC-MCNC: 3.6 G/DL (ref 2–4)
GLUCOSE SERPL-MCNC: 98 MG/DL (ref 65–100)
HCT VFR BLD AUTO: 43.6 % (ref 36.6–50.3)
HDLC SERPL-MCNC: 44 MG/DL
HDLC SERPL: 2.8 (ref 0–5)
HGB BLD-MCNC: 13.1 G/DL (ref 12.1–17)
LDLC SERPL CALC-MCNC: 69.2 MG/DL (ref 0–100)
MCH RBC QN AUTO: 29.4 PG (ref 26–34)
MCHC RBC AUTO-ENTMCNC: 30 G/DL (ref 30–36.5)
MCV RBC AUTO: 97.8 FL (ref 80–99)
NRBC # BLD: 0 K/UL (ref 0–0.01)
NRBC BLD-RTO: 0 PER 100 WBC
NT PRO BNP: 80 PG/ML
PLATELET # BLD AUTO: 218 K/UL (ref 150–400)
PMV BLD AUTO: 10.6 FL (ref 8.9–12.9)
POTASSIUM SERPL-SCNC: 4.4 MMOL/L (ref 3.5–5.1)
PROT SERPL-MCNC: 7.1 G/DL (ref 6.4–8.2)
RBC # BLD AUTO: 4.46 M/UL (ref 4.1–5.7)
SODIUM SERPL-SCNC: 141 MMOL/L (ref 136–145)
TRIGL SERPL-MCNC: 59 MG/DL
TSH SERPL DL<=0.05 MIU/L-ACNC: 0.75 UIU/ML (ref 0.36–3.74)
VLDLC SERPL CALC-MCNC: 11.8 MG/DL
WBC # BLD AUTO: 7.7 K/UL (ref 4.1–11.1)

## 2025-07-06 ENCOUNTER — RESULTS FOLLOW-UP (OUTPATIENT)
Facility: CLINIC | Age: 80
End: 2025-07-06

## (undated) DEVICE — SET GRAV CK VLV NEEDLESS ST 3 GANGED 4WAY STPCOCK HI FLO 10

## (undated) DEVICE — CATHETER ABLAT L160CM DIA4MM BPLR ELECTRD W13XL40MM ULT LNG

## (undated) DEVICE — Device

## (undated) DEVICE — CATH IV AUTOGRD BC PNK 20GA 25 -- INSYTE

## (undated) DEVICE — ELECTRODE,RADIOTRANSLUCENT,FOAM,3PK: Brand: MEDLINE

## (undated) DEVICE — BLUNTFILL: Brand: MONOJECT

## (undated) DEVICE — SOLIDIFIER MEDC 1200ML -- CONVERT TO 356117

## (undated) DEVICE — BAG BELONG PT PERS CLEAR HANDL

## (undated) DEVICE — BASIN EMESIS 500CC ROSE 250/CS 60/PLT: Brand: MEDEGEN MEDICAL PRODUCTS, LLC

## (undated) DEVICE — SET ADMIN 16ML TBNG L100IN 2 Y INJ SITE IV PIGGY BK DISP

## (undated) DEVICE — ADULT SPO2 SENSOR: Brand: NELLCOR

## (undated) DEVICE — KIT COLON W/ 1.1OZ LUB AND 2 END

## (undated) DEVICE — BITEBLOCK ENDOSCP 60FR MAXI WHT POLYETH STURDY W/ VELC WVN

## (undated) DEVICE — CANN NASAL O2 CAPNOGRAPHY AD -- FILTERLINE

## (undated) DEVICE — CUFF RMFG BP INF SZ 11L DISP --

## (undated) DEVICE — BASIN EMSIS 16OZ GRAPHITE PLAS KID SHP MOLD GRAD FOR ORAL

## (undated) DEVICE — CANNULA CUSH AD W/ 14FT TBG

## (undated) DEVICE — CONTAINER SPEC 20 ML LID NEUT BUFF FORMALIN 10 % POLYPR STS

## (undated) DEVICE — CATHETER IV 22GA L1IN OD0.8382-0.9144MM ID0.6096-0.6858MM 382523

## (undated) DEVICE — IV STRT KT 3282] LSL INDUSTRIES INC]

## (undated) DEVICE — FORCEPS BX L240CM JAW DIA2.8MM L CAP W/ NDL MIC MESH TOOTH

## (undated) DEVICE — 1200 GUARD II KIT W/5MM TUBE W/O VAC TUBE: Brand: GUARDIAN

## (undated) DEVICE — CATH IV AUTOGRD BC BLU 22GA 25 -- INSYTE

## (undated) DEVICE — SYR 5ML 1/5 GRAD LL NSAF LF --

## (undated) DEVICE — CUFF ADULT 1 PC 1 VINYL DISP --

## (undated) DEVICE — BAG SPEC BIOHZRD 10 X 10 IN --

## (undated) DEVICE — 90 RFA FOCAL CATHETER,80 APPLICATIONS: Brand: BARRX

## (undated) DEVICE — 3M™ CUROS™ DISINFECTING CAP FOR NEEDLELESS CONNECTORS 270/CARTON 20 CARTONS/CASE CFF1-270: Brand: CUROS™

## (undated) DEVICE — KENDALL RADIOLUCENT FOAM MONITORING ELECTRODE -RECTANGULAR SHAPE: Brand: KENDALL

## (undated) DEVICE — SENSOR SPO2 NELLCOR FLX REUSE --

## (undated) DEVICE — SYRINGE MED 3ML CLR PLAS STD N CTRL LUERLOCK TIP DISP

## (undated) DEVICE — INFANT SPO2 SENSOR: Brand: NELLCOR

## (undated) DEVICE — CUFF BLD PRSS AD SZ 11 FOR 25-34CM 1 TB TRIPURPOSE CONN

## (undated) DEVICE — CUFF RMFG BP INF SZ 11 DISP -- LAWSON OEM ITEM 238915

## (undated) DEVICE — SYRINGE MED 5ML STD CLR PLAS LUERLOCK TIP N CTRL DISP

## (undated) DEVICE — SET ADMIN 16ML TBNG L100IN 2 Y INJ SITE IV PIGGY BK DISP (ORDER IN MULIPLES OF 48)

## (undated) DEVICE — (D)SENSOR RMFG 02 PULS OXMTR -- DISC BY MFR USE ITEM 133445

## (undated) DEVICE — SYRINGE MEDICAL 3ML CLEAR PLASTIC STANDARD NON CONTROL LUERLOCK TIP DISPOSABLE

## (undated) DEVICE — NDL PRT INJ NSAF BLNT 18GX1.5 --

## (undated) DEVICE — NDL FLTR TIP 5 MIC 18GX1.5IN --

## (undated) DEVICE — SYR 3ML LL TIP 1/10ML GRAD --

## (undated) DEVICE — SOLIDIFIER FLD 2OZ 1500CC N DISINF IN BTL DISP SAFESORB

## (undated) DEVICE — BLUNTFILL WITH FILTER: Brand: MONOJECT

## (undated) DEVICE — CATHETER ABLATN 85 CM SFT SZ BARRX 360

## (undated) DEVICE — SIMPLICITY FLUFF UNDERPAD 23X36, MODERATE: Brand: SIMPLICITY